# Patient Record
Sex: FEMALE | Race: ASIAN | NOT HISPANIC OR LATINO | Employment: PART TIME | URBAN - METROPOLITAN AREA
[De-identification: names, ages, dates, MRNs, and addresses within clinical notes are randomized per-mention and may not be internally consistent; named-entity substitution may affect disease eponyms.]

---

## 2017-01-17 ENCOUNTER — OFFICE VISIT (OUTPATIENT)
Dept: URGENT CARE | Facility: URGENT CARE | Age: 57
End: 2017-01-17
Payer: COMMERCIAL

## 2017-01-17 VITALS
DIASTOLIC BLOOD PRESSURE: 70 MMHG | SYSTOLIC BLOOD PRESSURE: 120 MMHG | RESPIRATION RATE: 16 BRPM | HEART RATE: 87 BPM | TEMPERATURE: 98.7 F | OXYGEN SATURATION: 99 %

## 2017-01-17 DIAGNOSIS — J01.90 ACUTE SINUSITIS WITH SYMPTOMS > 10 DAYS: Primary | ICD-10-CM

## 2017-01-17 DIAGNOSIS — J02.9 ACUTE PHARYNGITIS, UNSPECIFIED ETIOLOGY: ICD-10-CM

## 2017-01-17 LAB
DEPRECATED S PYO AG THROAT QL EIA: NORMAL
MICRO REPORT STATUS: NORMAL
SPECIMEN SOURCE: NORMAL

## 2017-01-17 PROCEDURE — 99213 OFFICE O/P EST LOW 20 MIN: CPT | Performed by: NURSE PRACTITIONER

## 2017-01-17 PROCEDURE — 87880 STREP A ASSAY W/OPTIC: CPT | Performed by: NURSE PRACTITIONER

## 2017-01-17 PROCEDURE — 87081 CULTURE SCREEN ONLY: CPT | Performed by: NURSE PRACTITIONER

## 2017-01-17 RX ORDER — AZITHROMYCIN 250 MG/1
TABLET, FILM COATED ORAL
Qty: 6 TABLET | Refills: 0 | Status: SHIPPED | OUTPATIENT
Start: 2017-01-17 | End: 2017-03-29

## 2017-01-17 NOTE — PATIENT INSTRUCTIONS
Sinusitis (Antibiotic Treatment)    The sinuses are air-filled spaces within the bones of the face. They connect to the inside of the nose. Sinusitis is an inflammation of the tissue lining the sinus cavity. Sinus inflammation can occur during a cold. It can also be due to allergies to pollens and other particles in the air. Sinusitis can cause symptoms of sinus congestion and fullness. A sinus infection causes fever, headache and facial pain. There is often green or yellow drainage from the nose or into the back of the throat (post-nasal drip). You have been given antibiotics to treat this condition.  Home care:    Take the full course of antibiotics as instructed. Do not stop taking them, even if you feel better.    Drink plenty of water, hot tea, and other liquids. This may help thin mucus. It also may promote sinus drainage.    Heat may help soothe painful areas of the face. Use a towel soaked in hot water. Or,  the shower and direct the hot spray onto your face. Using a vaporizer along with a menthol rub at night may also help.     An expectorant containing guaifenesin may help thin the mucus and promote drainage from the sinuses.    Over-the-counter decongestants may be used unless a similar medicine was prescribed. Nasal sprays work the fastest. Use one that contains phenylephrine or oxymetazoline. First blow the nose gently. Then use the spray. Do not use these medicines more often than directed on the label or symptoms may get worse. You may also use tablets containing pseudoephedrine. Avoid products that combine ingredients, because side effects may be increased. Read labels. You can also ask the pharmacist for help. (NOTE: Persons with high blood pressure should not use decongestants. They can raise blood pressure.)    Over-the-counter antihistamines may help if allergies contributed to your sinusitis.      Do not use nasal rinses or irrigation during an acute sinus infection, unless told to by  your health care provider. Rinsing may spread the infection to other sinuses.    Use acetaminophen or ibuprofen to control pain, unless another pain medicine was prescribed. (If you have chronic liver or kidney disease or ever had a stomach ulcer, talk with your doctor before using these medicines. Aspirin should never be used in anyone under 18 years of age who is ill with a fever. It may cause severe liver damage.)    Don't smoke. This can worsen symptoms.  Follow-up care  Follow up with your healthcare provider or our staff if you are not improving within the next week.  When to seek medical advice  Call your healthcare provider if any of these occur:    Facial pain or headache becoming more severe    Stiff neck    Unusual drowsiness or confusion    Swelling of the forehead or eyelids    Vision problems, including blurred or double vision    Fever of 100.4 F (38 C) or higher, or as directed by your healthcare provider    Seizure    Breathing problems    Symptoms not resolving within 10 days    9533-3000 [employer]. [address] [place] [address], [place] [address]. All rights reserved. This information is not intended as a substitute for professional medical care. Always follow your healthcare professional's instructions.

## 2017-01-17 NOTE — PROGRESS NOTES
SUBJECTIVE:                                                    Goldie Paul is a 56 year old female who presents to clinic today for the following health issues:    RESPIRATORY SYMPTOMS/SINUSITIS CONCERN      Duration: Two weeks    Description:  Nasal congestion, rhinorrhea, intermittent sore throat (due to PND), frontal/maxillary facial pain/pressure, intermittent cough (sometimes dry, sometimes productive), headache and fatigue/malaise.  Experiencing body aches but attributes this to fibromyalgia.      Severity: Mild to moderate.    Accompanying signs and symptoms:     History (predisposing factors):  None    Precipitating or alleviating factors: None    Therapies tried and outcome:  rest and fluids nasal spray/wash - Inhales steam with Vicks solution from a pot of boiling water which is very helpful at reducing sx temporarily.     Received a flu shot this season.    Problem list and histories reviewed, as indicated.  Additional history: as documented    Problem list, Medication list, Allergies, and Medical/SocialSurgical histories reviewed in [facility] and updated as appropriate.    ROS:  Constitutional, HEENT, cardiovascular, pulmonary, gi and gu systems are negative, except as otherwise noted.    OBJECTIVE:                                                    /70 mmHg  Pulse 87  Temp(Src) 98.7  F (37.1  C) (Oral)  Resp 16  SpO2 99%  There is no weight on file to calculate BMI.      GENERAL: Healthy, alert and in no acute distress  EYES: Eyes grossly normal to inspection, PERRL and conjunctivae and sclerae normal  HENT: Ear canals and TM's normal, nose and mouth without ulcers or lesions. Nasal turbinates red with drainage noted. Pharynx erythematous with PND present.  Bilateral cheeks slightly swollen and tender to palpation.  NECK: No adenopathy, no asymmetry, masses, or scars.  RESP: Lungs clear to auscultation - no rales, rhonchi or wheezes.  CV: Regular rate and rhythm, normal S1 S2, no S3 or S4,  no murmur, click or rub.  PSYCH: mentation appears normal, affect normal/bright.    Diagnostic Test Results:  Results for orders placed or performed in visit on 01/17/17 (from the past 24 hour(s))   Rapid strep screen   Result Value Ref Range    Specimen Description Throat     Rapid Strep A Screen       NEGATIVE: No Group A streptococcal antigen detected by immunoassay, await   culture report.      Micro Report Status FINAL 01/17/2017         ASSESSMENT:                                                    Acute Sinusitis with symptoms >10 days    PLAN:                                                    1. Acute pharyngitis, unspecified etiology  - Rapid strep screen  - Beta strep group A culture    2. Acute sinusitis with symptoms > 10 days  - Azithromycin (ZITHROMAX) 250 MG tablet; Two tablets first day, then one tablet daily for four days.  Dispense: 6 tablet; Refill: 0  - Review and implement home care measures per the patient instructions which were printed and given to the patient.     DYLAN Nettles [facility]

## 2017-01-17 NOTE — MR AVS SNAPSHOT
After Visit Summary   1/17/2017    Goldie Paul    MRN: [id number]           Patient Information     Date Of Birth          1960        Visit Information        Provider Department      1/17/2017 3:30 PM John Lemos NP [facility]        Today's Diagnoses     Acute pharyngitis, unspecified etiology    -  1       Care Instructions      Sinusitis (Antibiotic Treatment)    The sinuses are air-filled spaces within the bones of the face. They connect to the inside of the nose. Sinusitis is an inflammation of the tissue lining the sinus cavity. Sinus inflammation can occur during a cold. It can also be due to allergies to pollens and other particles in the air. Sinusitis can cause symptoms of sinus congestion and fullness. A sinus infection causes fever, headache and facial pain. There is often green or yellow drainage from the nose or into the back of the throat (post-nasal drip). You have been given antibiotics to treat this condition.  Home care:    Take the full course of antibiotics as instructed. Do not stop taking them, even if you feel better.    Drink plenty of water, hot tea, and other liquids. This may help thin mucus. It also may promote sinus drainage.    Heat may help soothe painful areas of the face. Use a towel soaked in hot water. Or,  the shower and direct the hot spray onto your face. Using a vaporizer along with a menthol rub at night may also help.     An expectorant containing guaifenesin may help thin the mucus and promote drainage from the sinuses.    Over-the-counter decongestants may be used unless a similar medicine was prescribed. Nasal sprays work the fastest. Use one that contains phenylephrine or oxymetazoline. First blow the nose gently. Then use the spray. Do not use these medicines more often than directed on the label or symptoms may get worse. You may also use tablets containing pseudoephedrine. Avoid products that combine ingredients,  because side effects may be increased. Read labels. You can also ask the pharmacist for help. (NOTE: Persons with high blood pressure should not use decongestants. They can raise blood pressure.)    Over-the-counter antihistamines may help if allergies contributed to your sinusitis.      Do not use nasal rinses or irrigation during an acute sinus infection, unless told to by your health care provider. Rinsing may spread the infection to other sinuses.    Use acetaminophen or ibuprofen to control pain, unless another pain medicine was prescribed. (If you have chronic liver or kidney disease or ever had a stomach ulcer, talk with your doctor before using these medicines. Aspirin should never be used in anyone under 18 years of age who is ill with a fever. It may cause severe liver damage.)    Don't smoke. This can worsen symptoms.  Follow-up care  Follow up with your healthcare provider or our staff if you are not improving within the next week.  When to seek medical advice  Call your healthcare provider if any of these occur:    Facial pain or headache becoming more severe    Stiff neck    Unusual drowsiness or confusion    Swelling of the forehead or eyelids    Vision problems, including blurred or double vision    Fever of 100.4 F (38 C) or higher, or as directed by your healthcare provider    Seizure    Breathing problems    Symptoms not resolving within 10 days    1612-8355 [employer]. [address] [place] [address], [place] [address]. All rights reserved. This information is not intended as a substitute for professional medical care. Always follow your healthcare professional's instructions.              Follow-ups after your visit        Who to contact     If you have questions or need follow up information about today's clinic visit or your schedule please contact [facility] directly at [phone].  Normal or non-critical lab and imaging results will be communicated to you by [place]  letter or phone within 4 business days after the clinic has received the results. If you do not hear from us within 7 days, please contact the clinic through [employer] or phone. If you have a critical or abnormal lab result, we will notify you by phone as soon as possible.  Submit refill requests through [employer] or call your pharmacy and they will forward the refill request to us. Please allow 3 business days for your refill to be completed.          Additional Information About Your Visit        [employer] Information     [employer] gives you secure access to your electronic health record. If you see a primary care provider, you can also send messages to your care team and make appointments. If you have questions, please call your primary care clinic.  If you do not have a primary care provider, please call [phone] and they will assist you.        Care EveryWhere ID     This is your Care EveryWhere ID. This could be used by other organizations to access your [facility] [place] medical records  [id number]        Your Vitals Were     Pulse Temperature Respirations Pulse Oximetry          87 98.7  F (37.1  C) (Oral) 16 99%         Blood Pressure from Last 3 Encounters:   01/17/17 120/70   12/01/16 102/62   05/05/16 102/58    Weight from Last 3 Encounters:   12/01/16 124 lb (56.246 kg)   05/05/16 116 lb (52.617 kg)   10/05/15 119 lb (53.978 kg)              We Performed the Following     Beta strep group A culture     Rapid strep screen        Primary Care Provider Office Phone # Fax #    Misbah Negrete [phone]       [facility] [address] [address]        Thank you!     Thank you for choosing [facility]  for your care. Our goal is always to provide you with excellent care. Hearing back from our patients is one way we can continue to improve our services. Please take a few minutes to complete the written survey that you may receive in the mail after your visit with us.  Thank you!             Your Updated Medication List - Protect others around you: Learn how to safely use, store and throw away your medicines at www.disposemymeds.org.          This list is accurate as of: 1/17/17  3:49 PM.  Always use your most recent med list.                   Brand Name Dispense Instructions for use    cyclobenzaprine 5 MG tablet    FLEXERIL    180 tablet    1-2 tabs at bedtime as needed for back pain/spasms       estradiol 0.05 MG/24HR BIW patch    VIVELLE-DOT    24 patch    Place 1 patch onto the skin twice a week       * gabapentin 100 MG capsule    NEURONTIN    180 capsule    1-2 capsules in AM daily as needed for pain       * gabapentin 300 MG capsule    NEURONTIN    180 capsule    2 capsule daily in the PM for pain       ibuprofen 200 MG capsule     100 capsule    Take 200 mg by mouth every 6 hours as needed for fever       melatonin 3 MG tablet     100 tablet    Take 1 tablet (3 mg) by mouth nightly as needed for sleep       pantoprazole 40 MG EC tablet    PROTONIX    90 tablet    Take 1 tablet (40 mg) by mouth daily       traMADol 50 MG tablet    ULTRAM    120 tablet    1-2 tabs twice a day as needed for pain       * Notice:  This list has 2 medication(s) that are the same as other medications prescribed for you. Read the directions carefully, and ask your doctor or other care provider to review them with you.

## 2017-01-17 NOTE — NURSING NOTE
"Chief Complaint   Patient presents with     Urgent Care     pt c/o sinus pressure and pn in face x 2 wks--headache --achey--at onset some ST and R ear pn but that seems better--SOB      Initial /70 mmHg  Pulse 87  Temp(Src) 98.7  F (37.1  C) (Oral)  Resp 16  SpO2 99% Estimated body mass index is 21.62 kg/(m^2) as calculated from the following:    Height as of 5/5/16: 5' 3.5\" (1.613 m).    Weight as of 12/1/16: 124 lb (56.246 kg)..  BP completed using cuff size: sree Mackey CMA ([facility])    "

## 2017-01-19 LAB
BACTERIA SPEC CULT: NORMAL
MICRO REPORT STATUS: NORMAL
SPECIMEN SOURCE: NORMAL

## 2017-03-13 ENCOUNTER — TRANSFERRED RECORDS (OUTPATIENT)
Dept: HEALTH INFORMATION MANAGEMENT | Facility: CLINIC | Age: 57
End: 2017-03-13

## 2017-03-29 ENCOUNTER — OFFICE VISIT (OUTPATIENT)
Dept: PEDIATRICS | Facility: CLINIC | Age: 57
End: 2017-03-29
Payer: COMMERCIAL

## 2017-03-29 VITALS
TEMPERATURE: 98.1 F | HEART RATE: 79 BPM | DIASTOLIC BLOOD PRESSURE: 72 MMHG | BODY MASS INDEX: 21.79 KG/M2 | OXYGEN SATURATION: 99 % | SYSTOLIC BLOOD PRESSURE: 116 MMHG | HEIGHT: 63 IN | WEIGHT: 123 LBS

## 2017-03-29 DIAGNOSIS — R10.13 ABDOMINAL PAIN, EPIGASTRIC: ICD-10-CM

## 2017-03-29 DIAGNOSIS — R07.89 ATYPICAL CHEST PAIN: Primary | ICD-10-CM

## 2017-03-29 DIAGNOSIS — K21.9 GASTROESOPHAGEAL REFLUX DISEASE WITHOUT ESOPHAGITIS: ICD-10-CM

## 2017-03-29 DIAGNOSIS — R00.2 PALPITATIONS: ICD-10-CM

## 2017-03-29 LAB
ERYTHROCYTE [DISTWIDTH] IN BLOOD BY AUTOMATED COUNT: 12.4 % (ref 10–15)
HCT VFR BLD AUTO: 41.9 % (ref 35–47)
HGB BLD-MCNC: 14 G/DL (ref 11.7–15.7)
LIPASE SERPL-CCNC: 164 U/L (ref 73–393)
MCH RBC QN AUTO: 30.2 PG (ref 26.5–33)
MCHC RBC AUTO-ENTMCNC: 33.4 G/DL (ref 31.5–36.5)
MCV RBC AUTO: 91 FL (ref 78–100)
PLATELET # BLD AUTO: 197 10E9/L (ref 150–450)
RBC # BLD AUTO: 4.63 10E12/L (ref 3.8–5.2)
WBC # BLD AUTO: 5.4 10E9/L (ref 4–11)

## 2017-03-29 PROCEDURE — 83690 ASSAY OF LIPASE: CPT | Performed by: INTERNAL MEDICINE

## 2017-03-29 PROCEDURE — 99214 OFFICE O/P EST MOD 30 MIN: CPT | Performed by: INTERNAL MEDICINE

## 2017-03-29 PROCEDURE — 93000 ELECTROCARDIOGRAM COMPLETE: CPT | Performed by: INTERNAL MEDICINE

## 2017-03-29 PROCEDURE — 85027 COMPLETE CBC AUTOMATED: CPT | Performed by: INTERNAL MEDICINE

## 2017-03-29 PROCEDURE — 36415 COLL VENOUS BLD VENIPUNCTURE: CPT | Performed by: INTERNAL MEDICINE

## 2017-03-29 NOTE — PATIENT INSTRUCTIONS
1. EKG looks ok  2. Labs today: blood counts, lipase (pancreas test)  3. Start zantac 150 mg twice a day in addition to the protonix  - if no improvement in symptoms in 2 weeks, would consider further testing

## 2017-03-29 NOTE — PROGRESS NOTES
SUBJECTIVE:                                                    Goldie Paul is a 56 year old female who presents to clinic today for the following health issues:    GERD/Heartburn      Duration: 1 month    Description (location/character/radiation): sharp epigastric pain, tightness under sternum, occ palpitations    Intensity:  moderate    Accompanying signs and symptoms:  food getting stuck: no   nausea/vomiting/blood: no   abdominal pain: no   black/tarry or bloody stools: no :    History (similar episodes/previous evaluation): 2 years ago and started Protonix    Precipitating or alleviating factors:  worse with no particular food or drink.  current NSAID/Aspirin use: YES- Ibuprofen    Therapies tried and outcome: Protonix and medication not helpful    55 y/o F with h/o fibromyalgia on ibuprofen, gabapentin and tramadol and GERD who presents with 1 month of increasing epigastric/left chest pain as well as palpitations.    Describes constant epigastric pain and pain under left breast but worse at times. Not short of breath. Sometimes pain radiates to shoulder blade. Pain worse last Thursday and again today. History of fibromyalgia with acheness in chest area. Has been slowly trying to wean off of pantoprazole - had been taking every other day for last 1-1.5 months. Went back to taking daily when pain for last 2 weeks and hasn't improved at all. Also taking ibuprofen 400 mg 1-2 times a day for fibromyalgia. Amount has not increased. Pain not worse with spicy foods, position changes or exertion. Doesn't drink any carbonated beverages or alcohol. Doesn't smoke. Drinks little caffeine. Has family history of heart disease in both parents. Last chol was done at [facility] in January and was mildly elevated. Is not on cholesterol medication. Palpitations were worse a few weeks ago. Worse if has caffeine. Has checked pulse and feels like skipped beats, but not tachy. Previous history of holter monitor that showed  "1 PVC. Tried tums on Saturday and seemed to help.     Reviewed and updated as needed this visit by clinical staff  Tobacco  Allergies  Meds  Problems  Med Hx  Surg Hx  Fam Hx  Soc Hx        Reviewed and updated as needed this visit by Provider  Allergies  Meds  Problems         -------------------------------------    Problem list and histories reviewed & adjusted, as indicated.  Additional history: as documented    ROS:  Constitutional, HEENT, cardiovascular, pulmonary, GI, , musculoskeletal, neuro, skin, endocrine and psych systems are negative, except as otherwise noted.    Problem list, Medication list, Allergies, and Medical/Social/Surgical histories reviewed in [facility] and updated as appropriate.    OBJECTIVE:                                                    /72 (BP Location: Right arm, Patient Position: Chair, Cuff Size: Adult Regular)  Pulse 79  Temp 98.1  F (36.7  C) (Tympanic)  Ht 5' 3\" (1.6 m)  Wt 123 lb (55.8 kg)  SpO2 99%  BMI 21.79 kg/m2   Body mass index is 21.79 kg/(m^2).  General Appearance: healthy, alert and no distress  Eyes:   no discharge, erythema.  Normal pupils.  Neck: Supple.  No adenopathy, no asymmetry, masses, or scars and thyroid normal to palpation  Respiratory: lungs clear to auscultation - no rales, rhonchi or wheezes.  Cardiovascular: regular rate and rhythm, normal S1 S2, no S3 or S4 and no murmur, click or rub.  No peripheral edema. No pain with palpation of chest wall.  Abdomen: soft, mild diffuse tenderness, no rebound or guarding, no hepatosplenomegaly or masses, and bowel sounds normal  Skin: no rashes or lesions.  Well perfused and normal turgor.    Diagnostic Test Results:  Results for orders placed or performed in visit on 03/29/17 (from the past 24 hour(s))   CBC with platelets   Result Value Ref Range    WBC 5.4 4.0 - 11.0 10e9/L    RBC Count 4.63 3.8 - 5.2 10e12/L    Hemoglobin 14.0 11.7 - 15.7 g/dL    Hematocrit 41.9 35.0 - 47.0 %    MCV 91 78 - " 100 fl    MCH 30.2 26.5 - 33.0 pg    MCHC 33.4 31.5 - 36.5 g/dL    RDW 12.4 10.0 - 15.0 %    Platelet Count 197 150 - 450 10e9/L      EKG: NSR, no ST changes    ASSESSMENT/PLAN:                                                      (R07.89) Atypical chest pain  (primary encounter diagnosis)  (R10.13) Abdominal pain, epigastric  (K21.9) Gastroesophageal reflux disease without esophagitis  Comment: epigastric and chest pain without immediate trigger. Suspect this is more gastric with decreasing protonix around the time the pain started. A little surprised pain not improving with resuming daily use. Main cardiac risk factor is family history. Chol moderately elevated with , but 10 year risk low at 1.4%. EKG normal  Plan: EKG 12-lead complete w/read - Clinics  - will start rantidine 150 mg bid for 2 weeks, continue protonix  - CBC negative, lipase pending with radiation of pain to back  - if symptoms do not resolve, will consider EGD vs stress test depending on if any change in symptoms  - would favor starting with stress test if no improvement with ranitidine    (R00.2) Palpitations  Comment: likely PVC. ekg negative  Plan: EKG 12-lead complete w/read - Clinics  - discussed avoiding caffeine  - will consider event monitor if increase in frequency      Follow up with Provider - as needed     [facility]

## 2017-03-29 NOTE — MR AVS SNAPSHOT
After Visit Summary   3/29/2017    Goldie Paul    MRN: [id number]           Patient Information     Date Of Birth          1960        Visit Information        Provider Department      3/29/2017 1:00 PM Nelsy Mccallum MD [facility] [place]        Today's Diagnoses     Palpitations    -  1    Atypical chest pain        Gastroesophageal reflux disease without esophagitis        Abdominal pain, epigastric          Care Instructions    1. EKG looks ok  2. Labs today: blood counts, lipase (pancreas test)  3. Start zantac 150 mg twice a day in addition to the protonix  - if no improvement in symptoms in 2 weeks, would consider further testing         Follow-ups after your visit        Who to contact     If you have questions or need follow up information about today's clinic visit or your schedule please contact [facility] [place] directly at [phone].  Normal or non-critical lab and imaging results will be communicated to you by Acronishart, letter or phone within 4 business days after the clinic has received the results. If you do not hear from us within 7 days, please contact the clinic through Acronishart or phone. If you have a critical or abnormal lab result, we will notify you by phone as soon as possible.  Submit refill requests through RENTISH or call your pharmacy and they will forward the refill request to us. Please allow 3 business days for your refill to be completed.          Additional Information About Your Visit        Acronishart Information     RENTISH gives you secure access to your electronic health record. If you see a primary care provider, you can also send messages to your care team and make appointments. If you have questions, please call your primary care clinic.  If you do not have a primary care provider, please call [phone] and they will assist you.        Care EveryWhere ID     This is your Care EveryWhere ID. This could be used by other organizations  "to access your [place] medical records  [id number]        Your Vitals Were     Pulse Temperature Height Pulse Oximetry BMI (Body Mass Index)       79 98.1  F (36.7  C) (Tympanic) 5' 3\" (1.6 m) 99% 21.79 kg/m2        Blood Pressure from Last 3 Encounters:   03/29/17 116/72   01/17/17 120/70   12/01/16 102/62    Weight from Last 3 Encounters:   03/29/17 123 lb (55.8 kg)   12/01/16 124 lb (56.2 kg)   05/05/16 116 lb (52.6 kg)              We Performed the Following     CBC with platelets     EKG 12-lead complete w/read - Clinics     Lipase          Today's Medication Changes          These changes are accurate as of: 3/29/17  2:07 PM.  If you have any questions, ask your nurse or doctor.               Start taking these medicines.        Dose/Directions    ranitidine 150 MG tablet   Commonly known as:  ZANTAC   Used for:  Gastroesophageal reflux disease without esophagitis   Started by:  Nelsy Mccallum MD        Dose:  150 mg   Take 1 tablet (150 mg) by mouth 2 times daily   Quantity:  60 tablet   Refills:  1            Where to get your medicines      These medications were sent to [facility] [address] [place] [address]     Phone:  [phone]     ranitidine 150 MG tablet                Primary Care Provider Office Phone # Fax #    Nelsy Mccallum [phone]       [address] [place] [address]        Thank you!     Thank you for choosing [facility]  for your care. Our goal is always to provide you with excellent care. Hearing back from our patients is one way we can continue to improve our services. Please take a few minutes to complete the written survey that you may receive in the mail after your visit with us. Thank you!             Your Updated Medication List - Protect others around you: Learn how to safely use, store and throw away your medicines at www.disposemymeds.org.        "   This list is accurate as of: 3/29/17  2:07 PM.  Always use your most recent med list.                   Brand Name Dispense Instructions for use    cyclobenzaprine 5 MG tablet    FLEXERIL    180 tablet    1-2 tabs at bedtime as needed for back pain/spasms       estradiol 0.05 MG/24HR BIW patch    VIVELLE-DOT    24 patch    Place 1 patch onto the skin twice a week       * gabapentin 100 MG capsule    NEURONTIN    180 capsule    1-2 capsules in AM daily as needed for pain       * gabapentin 300 MG capsule    NEURONTIN    180 capsule    2 capsule daily in the PM for pain       ibuprofen 200 MG capsule     100 capsule    Take 200 mg by mouth every 6 hours as needed for fever       melatonin 3 MG tablet     100 tablet    Take 1 tablet (3 mg) by mouth nightly as needed for sleep       pantoprazole 40 MG EC tablet    PROTONIX    90 tablet    Take 1 tablet (40 mg) by mouth daily       ranitidine 150 MG tablet    ZANTAC    60 tablet    Take 1 tablet (150 mg) by mouth 2 times daily       traMADol 50 MG tablet    ULTRAM    120 tablet    1-2 tabs twice a day as needed for pain       * Notice:  This list has 2 medication(s) that are the same as other medications prescribed for you. Read the directions carefully, and ask your doctor or other care provider to review them with you.

## 2017-03-29 NOTE — NURSING NOTE
"Chief Complaint   Patient presents with     Gastrointestinal Problem       Initial /72 (BP Location: Right arm, Patient Position: Chair, Cuff Size: Adult Regular)  Pulse 79  Temp 98.1  F (36.7  C) (Tympanic)  Ht 5' 3\" (1.6 m)  Wt 123 lb (55.8 kg)  SpO2 99%  BMI 21.79 kg/m2 Estimated body mass index is 21.79 kg/(m^2) as calculated from the following:    Height as of this encounter: 5' 3\" (1.6 m).    Weight as of this encounter: 123 lb (55.8 kg).  Medication Reconciliation: complete   Aditi De La Paz LPN      "

## 2017-06-05 DIAGNOSIS — M79.7 FIBROMYALGIA: ICD-10-CM

## 2017-06-06 RX ORDER — TRAMADOL HYDROCHLORIDE 50 MG/1
TABLET ORAL
Qty: 120 TABLET | Refills: 4 | Status: SHIPPED | OUTPATIENT
Start: 2017-06-06 | End: 2019-09-30

## 2017-06-06 NOTE — TELEPHONE ENCOUNTER
tramadol      Last Written Prescription Date:  12/1/16  Last Fill Quantity: 120,   # refills: 5  Last Office Visit with [facility] prescribing provider: 12/1/16  Future Office visit:       Routing refill request to provider for review/approval because:  Drug not on the [facility] refill protocol or controlled substance

## 2017-07-10 DIAGNOSIS — M79.7 FIBROMYALGIA: ICD-10-CM

## 2017-07-11 RX ORDER — CYCLOBENZAPRINE HCL 5 MG
TABLET ORAL
Qty: 180 TABLET | Refills: 1 | Status: SHIPPED | OUTPATIENT
Start: 2017-07-11 | End: 2018-06-05

## 2017-07-11 NOTE — TELEPHONE ENCOUNTER
cyclobenzaprine (FLEXERIL) 5 MG tablet      Last Written Prescription Date:  12/01/2016  Last Fill Quantity: 180,   # refills: 3  Last Office Visit with [facility], [facility] or [facility] prescribing provider: 12/01/2016  Future Office visit:       Routing refill request to provider for review/approval because:  Drug not on the [facility], [facility] or [facility] refill protocol or controlled substance

## 2017-08-08 DIAGNOSIS — K21.9 GASTROESOPHAGEAL REFLUX DISEASE WITHOUT ESOPHAGITIS: ICD-10-CM

## 2017-08-08 NOTE — TELEPHONE ENCOUNTER
Routing refill request to provider for review/approval because:  A break in medication, please sign if ok.  Brooke Andersen, RN  Triage Nurse

## 2017-08-08 NOTE — TELEPHONE ENCOUNTER
ranitidine (ZANTAC) 150 MG tablet      Last Written Prescription Date: 3/29/2017  Last Fill Quantity: 60,  # refills: 1   Last Office Visit with [facility], [facility] or [facility] prescribing provider: 3/29/2017

## 2017-09-05 ENCOUNTER — OFFICE VISIT (OUTPATIENT)
Dept: INTERNAL MEDICINE | Facility: CLINIC | Age: 57
End: 2017-09-05
Payer: COMMERCIAL

## 2017-09-05 VITALS
TEMPERATURE: 98.3 F | OXYGEN SATURATION: 98 % | WEIGHT: 129 LBS | HEART RATE: 90 BPM | DIASTOLIC BLOOD PRESSURE: 70 MMHG | SYSTOLIC BLOOD PRESSURE: 110 MMHG | BODY MASS INDEX: 22.85 KG/M2

## 2017-09-05 DIAGNOSIS — M54.16 LUMBAR RADICULOPATHY: Primary | ICD-10-CM

## 2017-09-05 PROCEDURE — 99213 OFFICE O/P EST LOW 20 MIN: CPT | Performed by: INTERNAL MEDICINE

## 2017-09-05 RX ORDER — METHYLPREDNISOLONE 4 MG
TABLET, DOSE PACK ORAL
Qty: 21 TABLET | Refills: 0 | Status: SHIPPED | OUTPATIENT
Start: 2017-09-05 | End: 2017-11-16

## 2017-09-05 NOTE — MR AVS SNAPSHOT
After Visit Summary   9/5/2017    Goldie Paul    MRN: [id number]           Patient Information     Date Of Birth          1960        Visit Information        Provider Department      9/5/2017 4:30 PM Misbah Negrete MD [facility]        Today's Diagnoses     Lumbar radiculopathy    -  1       Follow-ups after your visit        Who to contact     If you have questions or need follow up information about today's clinic visit or your schedule please contact [facility] directly at [phone].  Normal or non-critical lab and imaging results will be communicated to you by Zep Solarhart, letter or phone within 4 business days after the clinic has received the results. If you do not hear from us within 7 days, please contact the clinic through Zep Solarhart or phone. If you have a critical or abnormal lab result, we will notify you by phone as soon as possible.  Submit refill requests through [employer] or call your pharmacy and they will forward the refill request to us. Please allow 3 business days for your refill to be completed.          Additional Information About Your Visit        MyChart Information     [employer] gives you secure access to your electronic health record. If you see a primary care provider, you can also send messages to your care team and make appointments. If you have questions, please call your primary care clinic.  If you do not have a primary care provider, please call [phone] and they will assist you.        Care EveryWhere ID     This is your Care EveryWhere ID. This could be used by other organizations to access your [place] medical records  [id number]        Your Vitals Were     Pulse Temperature Pulse Oximetry BMI (Body Mass Index)          90 98.3  F (36.8  C) (Oral) 98% 22.85 kg/m2         Blood Pressure from Last 3 Encounters:   09/05/17 110/70   03/29/17 116/72   01/17/17 120/70    Weight from Last 3 Encounters:   09/05/17  129 lb (58.5 kg)   03/29/17 123 lb (55.8 kg)   12/01/16 124 lb (56.2 kg)              Today, you had the following     No orders found for display         Today's Medication Changes          These changes are accurate as of: 9/5/17 11:59 PM.  If you have any questions, ask your nurse or doctor.               Start taking these medicines.        Dose/Directions    methylPREDNISolone 4 MG tablet   Commonly known as:  MEDROL DOSEPAK   Used for:  Lumbar radiculopathy   Started by:  Misbah Negrete MD        Follow package instructions   Quantity:  21 tablet   Refills:  0            Where to get your medicines      These medications were sent to [facility] [address] [place] [address] [address] [facility]  [address] [place] [address]     Phone:  [phone]     methylPREDNISolone 4 MG tablet                Primary Care Provider Office Phone # Fax #    Misbah Negrete [phone]       [address] [place] [address]        Equal Access to Services     [facility] AH: Hadii aad ku hadasho Soomaali, waaxda luqadaha, qaybta kaalmada adeegyada, waxay idiin hayaan meredith petersaradaisy augustin . So [facility] [phone].    ATENCIÓN: Si habla español, tiene a cloud disposición servicios gratuitos de asistencia lingüística. Llame al [phone].    We comply with applicable federal civil rights laws and [place] laws. We do not discriminate on the basis of race, color, national origin, age, disability sex, sexual orientation or gender identity.            Thank you!     Thank you for choosing [facility]  for your care. Our goal is always to provide you with excellent care. Hearing back from our patients is one way we can continue to improve our services. Please take a few minutes to complete the written survey that you may receive in the mail after your visit with us. Thank you!             Your Updated Medication List - Protect others around you: Learn how to safely use, store and throw away your  medicines at www.disposemymeds.org.          This list is accurate as of: 9/5/17 11:59 PM.  Always use your most recent med list.                   Brand Name Dispense Instructions for use Diagnosis    cyclobenzaprine 5 MG tablet    FLEXERIL    180 tablet    1-2 tabs at bedtime as needed fibromyalgia muscle pain    Fibromyalgia       estradiol 0.05 MG/24HR BIW patch    VIVELLE-DOT    24 patch    Place 1 patch onto the skin twice a week    Symptomatic menopausal or female climacteric states       * gabapentin 100 MG capsule    NEURONTIN    180 capsule    1-2 capsules in AM daily as needed for pain    Fibromyalgia       * gabapentin 300 MG capsule    NEURONTIN    180 capsule    2 capsule daily in the PM for pain    Fibromyalgia       ibuprofen 200 MG capsule     100 capsule    Take 200 mg by mouth every 6 hours as needed for fever    Fibromyalgia       melatonin 3 MG tablet     100 tablet    Take 1 tablet (3 mg) by mouth nightly as needed for sleep    Insomnia, unspecified type       methylPREDNISolone 4 MG tablet    MEDROL DOSEPAK    21 tablet    Follow package instructions    Lumbar radiculopathy       pantoprazole 40 MG EC tablet    PROTONIX    90 tablet    Take 1 tablet (40 mg) by mouth daily    Gastroesophageal reflux disease without esophagitis       ranitidine 150 MG tablet    ZANTAC    60 tablet    TAKE ONE TABLET BY MOUTH TWICE DAILY    Gastroesophageal reflux disease without esophagitis       traMADol 50 MG tablet    ULTRAM    120 tablet    TAKE ONE TO TWO TABLETS TWICE DAILY AS NEEDED FOR PAIN    Fibromyalgia       * Notice:  This list has 2 medication(s) that are the same as other medications prescribed for you. Read the directions carefully, and ask your doctor or other care provider to review them with you.

## 2017-09-05 NOTE — NURSING NOTE
"Chief Complaint   Patient presents with     Back Pain       Initial /70  Pulse 90  Temp 98.3  F (36.8  C) (Oral)  Wt 129 lb (58.5 kg)  SpO2 98%  BMI 22.85 kg/m2 Estimated body mass index is 22.85 kg/(m^2) as calculated from the following:    Height as of 3/29/17: 5' 3\" (1.6 m).    Weight as of this encounter: 129 lb (58.5 kg).  Medication Reconciliation: complete  "

## 2017-09-05 NOTE — PROGRESS NOTES
SUBJECTIVE:   Goldie Paul is a 57 year old female who presents to clinic today for the following health issues:      Back Pain       Duration: 2 days        Specific cause: running    Description:   Location of pain: low back bilateral  Character of pain: sharp  Pain radiation:radiates into the right leg and radiates into the left leg  New numbness or weakness in legs, not attributed to pain:  no     Intensity: Currently 7/10    History:   Pain interferes with job: Not applicable,   History of back problems: yes, LBP  Any previous MRI or X-rays: None  Sees a specialist for back pain:  No  Therapies tried without relief: chiropractor    Alleviating factors:   Improved by: NSAIDs and Flexeril, helped some    Precipitating factors:  Worsened by: Lifting, Bending, Standing, Sitting and Walking    Functional and Psychosocial Screen (Luis Fernando STarT Back):      Not performed today          Accompanying Signs & Symptoms:  Risk of Fracture:  None  Risk of Cauda Equina:  None  Risk of Infection:  None  Risk of Cancer:  None  Risk of Ankylosing Spondylitis:  Onset at age <35, male, AND morning back stiffness. no     Pt's past medical history, family history, habits, medications and allergies were reviewed with the patient today.  See snap shot for  HCM status. Most recent lab results reviewed with pt. Problem list and histories reviewed & adjusted, as indicated.  Additional history as below:    Was playing frisbee and then woke up the next AM and pain in back.  Normally able to do elliptical at gym 2-3x/week. Pain lumbar at the belt area and L>R.   Some radicular symptoms into the bilateral lower extremities as above going to the distal calves. Patient denies bowel or bladder incontinence. No weakness in the lower extremities. History of fibromyalgia but this feels much different     Additional ROS:   Constitutional, HEENT, Cardiovascular, Pulmonary, GI and , Neuro, MSK and Psych review of systems/symptoms are otherwise  "negative or unchanged from previous, except as noted above.      OBJECTIVE:  /70  Pulse 90  Temp 98.3  F (36.8  C) (Oral)  Wt 129 lb (58.5 kg)  SpO2 98%  BMI 22.85 kg/m2   Estimated body mass index is 22.85 kg/(m^2) as calculated from the following:    Height as of 3/29/17: 5' 3\" (1.6 m).    Weight as of this encounter: 129 lb (58.5 kg).  Eye: PERRL, EOMI  HENT: ear canals and TM's normal and nose and mouth without ulcers or lesions   Neck: no adenopathy. Thyroid normal to palpation. No bruits  Pulm: Lungs clear to auscultation   CV: Regular rates and rhythm  GI: Soft, nontender, Normal active bowel sounds, No hepatosplenomegaly or masses palpable  Ext: Peripheral pulses intact. No edema.  Neuro: Normal strength and tone, sensory exam grossly normal  Back: Tenderness to palpation bilateral (L>R) paralumbar area. Neg SLRT bilaterally though pain is worsened some specifically in lumbar area with this testing      Assessment/Plan: (See plan discussion below for further details)  1. Lumbar radiculopathy  - methylPREDNISolone (MEDROL DOSEPAK) 4 MG tablet; Follow package instructions  Dispense: 21 tablet; Refill: 0    Plan discussion:  Medrol dose pack 6 days  Heat/stretching in AM. Ice later part of the day as needed  If not improving with above after 1 week,then pt to email for referral to [facility] for physical therapy  May also try increasing Gabapentin shortterm at bedtime from 600mg -> 900mg       Misbah Negrete MD  Internal Medicine Department  [facility]        "

## 2017-09-05 NOTE — LETTER
[address] [address]  [phone]      9/5/2017     Re:   Goldie Paul  [facility] [facility] [address]        To whom it may concern,  Goldie Paul was seen in clinic today for medical issues.  Please excuse her from work today. If you have further questions regarding this matter, please feel free to contact me at [phone]    Sincerely,            Misbah Negrete MD  Internal Medicine

## 2017-09-06 ENCOUNTER — MYC MEDICAL ADVICE (OUTPATIENT)
Dept: INTERNAL MEDICINE | Facility: CLINIC | Age: 57
End: 2017-09-06

## 2017-09-06 DIAGNOSIS — M79.7 FIBROMYALGIA: Primary | ICD-10-CM

## 2017-09-06 DIAGNOSIS — G89.4 CHRONIC PAIN SYNDROME: ICD-10-CM

## 2017-10-09 ENCOUNTER — TRANSFERRED RECORDS (OUTPATIENT)
Dept: HEALTH INFORMATION MANAGEMENT | Facility: CLINIC | Age: 57
End: 2017-10-09

## 2017-10-25 ENCOUNTER — TRANSFERRED RECORDS (OUTPATIENT)
Dept: HEALTH INFORMATION MANAGEMENT | Facility: CLINIC | Age: 57
End: 2017-10-25

## 2017-11-02 ENCOUNTER — MYC MEDICAL ADVICE (OUTPATIENT)
Dept: INTERNAL MEDICINE | Facility: CLINIC | Age: 57
End: 2017-11-02

## 2017-11-02 DIAGNOSIS — M72.2 PLANTAR FASCIITIS: Primary | ICD-10-CM

## 2017-11-15 ENCOUNTER — THERAPY VISIT (OUTPATIENT)
Dept: PHYSICAL THERAPY | Facility: CLINIC | Age: 57
End: 2017-11-15
Payer: COMMERCIAL

## 2017-11-15 DIAGNOSIS — M54.50 CHRONIC BILATERAL LOW BACK PAIN WITHOUT SCIATICA: Primary | ICD-10-CM

## 2017-11-15 DIAGNOSIS — M54.2 CERVICALGIA: ICD-10-CM

## 2017-11-15 DIAGNOSIS — G89.29 CHRONIC BILATERAL LOW BACK PAIN WITHOUT SCIATICA: Primary | ICD-10-CM

## 2017-11-15 PROCEDURE — 97110 THERAPEUTIC EXERCISES: CPT | Mod: GP | Performed by: PHYSICAL THERAPIST

## 2017-11-15 PROCEDURE — 97163 PT EVAL HIGH COMPLEX 45 MIN: CPT | Mod: GP | Performed by: PHYSICAL THERAPIST

## 2017-11-15 PROCEDURE — 97112 NEUROMUSCULAR REEDUCATION: CPT | Mod: GP | Performed by: PHYSICAL THERAPIST

## 2017-11-15 NOTE — LETTER
[facility]  [address]  [address]  [place] [facility] [address]  [phone]    November 15, 2017    Re: Goldie Paul   :   1960  MRN:  [id number]   REFERRING PHYSICIAN:   Hector Condon    [facility]    Date of Initial Evaluation: 17  Visits:  Rxs Used: 1  Reason for Referral:     Chronic bilateral low back pain without sciatica  Cervicalgia    EVALUATION SUMMARY    [facility] Initial Evaluation    Subjective:  Patient is a 57 year old female presenting with rehab back hpi. Goldie Paul is a 57 year old female with a lumbar condition.  This is a recurrent condition  Pt has had two episodes of low back pain over the past three months.  Pt has had off and on low back pain for many years.  The difference with the most recent episodes of pain is that they were much more intense.  Pt also then developed neck pain after her low back improved.  Pt has never had any neck pain previously.  Pt has had the neck pain for just over a month.  First episode started with bending down, insidious onset with second episode. Pt works as a nurse at [employer].  Pt works out at [employer], uses elliptical and treadmill and some yoga back stretches. Site of Pain: low back and radiating down L>R leg occasionally to knee.  cervical and B shoulders.    Pain is described as aching and is constant and reported as 8/10 (neck 8/10, low back 4/10).  Associated symptoms:  Loss of motion/stiffness. Pain is the same all the time.  Symptoms are exacerbated by walking, bending and certain positions and relieved by rest.    Special tests:  MRI (normal low back). General health as reported by patient is good.  Past medical history: plantar fasciitis, fibromyalgia, numbness and tingling B feet, pain at night which is worse after a busy day. Current medications:  Muscle relaxants, sleep medication, anti-inflammatory and pain medication (hormone  replacement).    Patient is working in normal job without restrictions.  Primary job tasks include:  Prolonged standing and lifting.  Red flags: FM.    Objective:  Flexibility/Screens:   Positive screens:  Cervical and Lumbar              Re: Goldie Paul   :   1960    Alejandro Cervical Evaluation  Posture:  Sitting: fair  Standing: fair  Protruding Head: no  Wry Neck: no  Correction of Posture: no effect  Movement Loss:  Protrusion (PRO): nil  Flexion (Flex): min  Retraction (RET): min  Extension (EXT): min  Lateral Flexion Right (LF R): min  Lateral Flexion Left (LF L): mod and pain  Rotation Right (ROT R): min  Rotation Left (ROT L): mod and pain  Test Movements:  RET:   Repeat RET: During: decreases  After: better  Mechanical Response: IncROM  Conclusion: derangement  Principle of Treatment:  Extension: x     Alejandro Lumbar Evaluation  Posture:  Sitting: poor  Standing: fair  Lordosis: Reduced  Lateral Shift: no  Correction of Posture: no effect  Other Observations: repeated extension in lying with hips offset to L: decr/better with incr ROM  Movement Loss:  Flexion (Flex): nil  Extension (EXT): mod and pain  Side Hilger R (SG R): mod and pain  Side Glide L (SG L): min  Test Movements:  EIL:   Repeat EIL: During: produces  After: worse  Mechanical Response: no effect  Conclusion: derangement  Principle of Treatment:  Lateral: x    Assessment/Plan:    Patient is a 57 year old female with cervical, lumbar, both sides hip and both sides knee complaints.    Patient has the following significant findings with corresponding treatment plan.                Diagnosis 1:  Low back and neck pain      Re: Goldie Paul   :   1960        Pain -  hot/cold therapy, manual therapy, self management, education, directional preference exercise and home program  Decreased ROM/flexibility - manual therapy and therapeutic exercise  Decreased strength - therapeutic exercise and therapeutic activities  Impaired  muscle performance - neuro re-education  Decreased function - therapeutic activities  Impaired posture - neuro re-education        Therapy Evaluation Codes:   1) History comprised of:   Personal factors that impact the plan of care:      Gender, Past/current experiences, Profession and Time since onset of symptoms.    Comorbidity factors that impact the plan of care are:      Fibromyalgia, Numbness/tingling and Pain at night/rest.     Medications impacting care: Anti-inflammatory, Muscle relaxant, Pain and Sleep.  2) Examination of Body Systems comprised of:   Body structures and functions that impact the plan of care:      Cervical spine, Hip, Knee, Lumbar spine and Pelvis.   Activity limitations that impact the plan of care are:      Bending, Lifting, Standing, Walking and Working.  3) Clinical presentation characteristics are:   Unstable/Unpredictable.  4) Decision-Making    High complexity using standardized patient assessment instrument and/or measureable assessment of functional outcome.  Cumulative Therapy Evaluation is: High complexity.    Previous and current functional limitations:  (See Goal Flow Sheet for this information)    Short term and Long term goals: (See Goal Flow Sheet for this information)     Communication ability:  Patient appears to be able to clearly communicate and understand verbal and written communication and follow directions correctly.  Treatment Explanation - The following has been discussed with the patient:   RX ordered/plan of care  Anticipated outcomes  Possible risks and side effects  This patient would benefit from PT intervention to resume normal activities.   Rehab potential is good.    Frequency:  2 X week, once daily  Duration:  for 3 weeks          Re: Goldie Paul   :   1960        Discharge Plan:  Achieve all LTG.  Independent in home treatment program.  Reach maximal therapeutic benefit.              Thank you for your referral.    INQUIRIES  Therapist:  ________________________________________Noel Leiva   [facility] [place]  [address] [place] [address]  [place] [address]  Phone: [phone]  Fax: [phone]

## 2017-11-15 NOTE — PROGRESS NOTES
[facility] Initial Evaluation    Subjective:    Patient is a 57 year old female presenting with rehab back hpi.   Goldie Paul is a 57 year old female with a lumbar condition.      This is a recurrent condition  Pt has had two episodes of low back pain over the past three months.  Pt has had off and on low back pain for many years.  The difference with the most recent episodes of pain is that they were much more intense.  Pt also then developed neck pain after her low back improved.  Pt has never had any neck pain previously.  Pt has had the neck pain for just over a month.      First episode started with bending down, insidious onset with second episode.    Pt works as a nurse at [employer].      Pt works out at [employer], uses elliptical and treadmill and some yoga back stretches..    Site of Pain: low back and radiating down L>R leg occasionally to knee.  cervical and B shoulders.    Pain is described as aching and is constant and reported as 8/10 (neck 8/10, low back 4/10).  Associated symptoms:  Loss of motion/stiffness. Pain is the same all the time.  Symptoms are exacerbated by walking, bending and certain positions and relieved by rest.    Special tests:  MRI (normal low back).      General health as reported by patient is good.  Past medical history: plantar fasciitis, fibromyalgia, numbness and tingling B feet, pain at night which is worse after a busy day.      Current medications:  Muscle relaxants, sleep medication, anti-inflammatory and pain medication (hormone replacement).    Patient is working in normal job without restrictions.  Primary job tasks include:  Prolonged standing and lifting.        Red flags: FM.                        Objective:          Flexibility/Screens:   Positive screens:  Cervical and Lumbar              Physical Exam    Alejandro Cervical Evaluation    Posture:  Sitting: fair  Standing: fair  Protruding Head: no  Wry Neck: no  Correction of  Posture: no effect    Movement Loss:  Protrusion (PRO): nil  Flexion (Flex): min  Retraction (RET): min  Extension (EXT): min  Lateral Flexion Right (LF R): min  Lateral Flexion Left (LF L): mod and pain  Rotation Right (ROT R): min  Rotation Left (ROT L): mod and pain  Test Movements:      RET:   Repeat RET: During: decreases  After: better  Mechanical Response: IncROM                          Conclusion: derangement  Principle of Treatment:      Extension: x      Alejandro Lumbar Evaluation    Posture:  Sitting: poor  Standing: fair  Lordosis: Reduced  Lateral Shift: no  Correction of Posture: no effect  Other Observations: repeated extension in lying with hips offset to L: decr/better with incr ROM  Movement Loss:  Flexion (Flex): nil  Extension (EXT): mod and pain  Side Miles R (SG R): mod and pain  Side Glide L (SG L): min  Test Movements:        EIL:   Repeat EIL: During: produces  After: worse  Mechanical Response: no effect        Conclusion: derangement  Principle of Treatment:        Lateral: x                                         ROS    Assessment/Plan:      Patient is a 57 year old female with cervical, lumbar, both sides hip and both sides knee complaints.    Patient has the following significant findings with corresponding treatment plan.                Diagnosis 1:  Low back and neck pain      Pain -  hot/cold therapy, manual therapy, self management, education, directional preference exercise and home program  Decreased ROM/flexibility - manual therapy and therapeutic exercise  Decreased strength - therapeutic exercise and therapeutic activities  Impaired muscle performance - neuro re-education  Decreased function - therapeutic activities  Impaired posture - neuro re-education    Therapy Evaluation Codes:   1) History comprised of:   Personal factors that impact the plan of care:      Gender, Past/current experiences, Profession and Time since onset of symptoms.    Comorbidity factors that impact  the plan of care are:      Fibromyalgia, Numbness/tingling and Pain at night/rest.     Medications impacting care: Anti-inflammatory, Muscle relaxant, Pain and Sleep.  2) Examination of Body Systems comprised of:   Body structures and functions that impact the plan of care:      Cervical spine, Hip, Knee, Lumbar spine and Pelvis.   Activity limitations that impact the plan of care are:      Bending, Lifting, Standing, Walking and Working.  3) Clinical presentation characteristics are:   Unstable/Unpredictable.  4) Decision-Making    High complexity using standardized patient assessment instrument and/or measureable assessment of functional outcome.  Cumulative Therapy Evaluation is: High complexity.    Previous and current functional limitations:  (See Goal Flow Sheet for this information)    Short term and Long term goals: (See Goal Flow Sheet for this information)     Communication ability:  Patient appears to be able to clearly communicate and understand verbal and written communication and follow directions correctly.  Treatment Explanation - The following has been discussed with the patient:   RX ordered/plan of care  Anticipated outcomes  Possible risks and side effects  This patient would benefit from PT intervention to resume normal activities.   Rehab potential is good.    Frequency:  2 X week, once daily  Duration:  for 3 weeks  Discharge Plan:  Achieve all LTG.  Independent in home treatment program.  Reach maximal therapeutic benefit.    Please refer to the daily flowsheet for treatment today, total treatment time and time spent performing 1:1 timed codes.

## 2017-11-16 ENCOUNTER — OFFICE VISIT (OUTPATIENT)
Dept: INTERNAL MEDICINE | Facility: CLINIC | Age: 57
End: 2017-11-16
Payer: COMMERCIAL

## 2017-11-16 VITALS
WEIGHT: 126.5 LBS | HEART RATE: 90 BPM | OXYGEN SATURATION: 98 % | TEMPERATURE: 98.6 F | DIASTOLIC BLOOD PRESSURE: 82 MMHG | SYSTOLIC BLOOD PRESSURE: 110 MMHG | BODY MASS INDEX: 22.41 KG/M2 | HEIGHT: 63 IN

## 2017-11-16 DIAGNOSIS — R10.13 ABDOMINAL PAIN, EPIGASTRIC: Primary | ICD-10-CM

## 2017-11-16 DIAGNOSIS — Z12.11 SPECIAL SCREENING FOR MALIGNANT NEOPLASMS, COLON: ICD-10-CM

## 2017-11-16 PROCEDURE — 87338 HPYLORI STOOL AG IA: CPT | Performed by: INTERNAL MEDICINE

## 2017-11-16 PROCEDURE — 99214 OFFICE O/P EST MOD 30 MIN: CPT | Performed by: INTERNAL MEDICINE

## 2017-11-16 RX ORDER — SUCRALFATE 1 G/1
1 TABLET ORAL 4 TIMES DAILY
Qty: 40 TABLET | Refills: 1 | Status: SHIPPED | OUTPATIENT
Start: 2017-11-16 | End: 2017-12-22

## 2017-11-16 NOTE — PROGRESS NOTES
"Dr Merchant's note      Patient's instructions / PLAN:                                                        Plan:  1. Sucralfate 1 gram 4 times a day  2. Continue with Pantoprazole and Ranitidine   3. Abdomen ultrasound - To schedule this test you may call Scheduling center at [phone]    4. Upper endoscopy and COlonoscopy -- [facility] (   Appt. Line [phone] with GI   [place] [facility] ( [facility] [place]: [phone], [facility] phone: [phone]  5. Stool test for H pylori       ASSESSMENT & PLAN:                                                      (R10.13) Abdominal pain, epigastric  (primary encounter diagnosis)  Comment:   Plan: sucralfate (CARAFATE) 1 GM tablet,  Abdomen         Limited, GASTROENTEROLOGY ADULT REF PROCEDURE         ONLY, GASTROENTEROLOGY ADULT REF PROCEDURE         ONLY, H Pylori antigen, stool            (Z12.11) Special screening for malignant neoplasms, colon  Comment:   Plan: GASTROENTEROLOGY ADULT REF PROCEDURE ONLY,         GASTROENTEROLOGY ADULT REF PROCEDURE ONLY                 Chief complaint:                                                      epig pain     SUBJECTIVE:   Goldie Paul is a 57 year old female who presents to clinic today for the following health issues:    Epigastric pain   -- x 4 weeks.  -- Takes Pantoprazole and Ranitidine.  -- Little better than 4 w ago, but still persistent.  --  Food doesn't make a diff.  -- no blood in stools.        Review of Systems:                                                      ROS: negative for fever, chills, cough, wheezes, chest pain, shortness of breath, vomiting,  leg swelling pos abdominal pain,    A 10-point review of systems was obtained.  Those pertinent are above and in the in the Subjective section.  The rest of the systems are negative.           OBJECTIVE:             Physical exam:  Blood pressure 110/82, pulse 90, temperature 98.6  F (37  C), temperature source Oral, height 5' 3\" (1.6 m), " weight 126 lb 8 oz (57.4 kg), SpO2 98 %, not currently breastfeeding.   NAD, appears comfortable  Skin: no rashes   Neck: supple, no JVD,  No thyroidmegaly. Lymph nodes nonpalpable cervical and supraclavicular.  Chest: clear to auscultation bilaterally, good respiratory effort  Heart: S1 S2, RRR, no mgr appreciated  Abdomen: soft, mild epig  tender, no hepatosplenomegaly or masses appreciated, no abdominal bruit, present bowel sounds  Extremities: no edema,   Neurologic: A, Ox3, no focal signs appreciated    PMHx: reviewed  Past Medical History:   Diagnosis Date     Allergic rhinitis, cause unspecified      Disorders of bursae and tendons in shoulder region, unspecified 1/06    right     Endometriosis of other specified sites      Esophageal reflux      FIBROMYALGIA     Neg Rheum work-up     Hyperlipidemia      Impingement syndrome of left shoulder     s/p cortisone injection     Insomnia 12/23/2011     Lateral epicondylitis  of elbow 2/06    left     Plantar fascial fibromatosis      Polyneuropathy in other diseases classified elsewhere (H)      Radial styloid tenosynovitis     bilateral     Sprain of hand, unspecified site 1/06    right thumb MCP joint     Uncomplicated asthma       PSHx: reviewed  Past Surgical History:   Procedure Laterality Date     ABDOMEN SURGERY  1992    hysterectomy     APPENDECTOMY  1977     C NONSPECIFIC PROCEDURE  2004    Plantar fasciotomy     C NONSPECIFIC PROCEDURE  1992    DAVID, BSO for endometriosis        Meds: reviewed  Current Outpatient Prescriptions   Medication Sig Dispense Refill     order for DME Orthotics for both feet 1 Package 2     order for DME TENS unit. Use daily as needed for musculoskeletal pain 1 Device 0     ranitidine (ZANTAC) 150 MG tablet TAKE ONE TABLET BY MOUTH TWICE DAILY  60 tablet 11     cyclobenzaprine (FLEXERIL) 5 MG tablet 1-2 tabs at bedtime as needed fibromyalgia muscle pain 180 tablet 1     traMADol (ULTRAM) 50 MG tablet TAKE ONE TO TWO TABLETS TWICE  DAILY AS NEEDED FOR PAIN 120 tablet 4     estradiol (VIVELLE-DOT) 0.05 MG/24HR BIW patch Place 1 patch onto the skin twice a week 24 patch 3     gabapentin (NEURONTIN) 100 MG capsule 1-2 capsules in AM daily as needed for pain 180 capsule 3     gabapentin (NEURONTIN) 300 MG capsule 2 capsule daily in the PM for pain 180 capsule 3     melatonin 3 MG tablet Take 1 tablet (3 mg) by mouth nightly as needed for sleep 100 tablet 3     pantoprazole (PROTONIX) 40 MG EC tablet Take 1 tablet (40 mg) by mouth daily 90 tablet 3     ibuprofen 200 MG capsule Take 200 mg by mouth every 6 hours as needed for fever 100 capsule 3       Soc Hx: reviewed  Fam Hx: reviewed          Leanne Merchant MD  Internal Medicine

## 2017-11-16 NOTE — PATIENT INSTRUCTIONS
Plan:  1. Sucralfate 1 gram 4 times a day  2. Continue with Pantoprazole and Ranitidine   3. Abdomen ultrasound - To schedule this test you may call Scheduling center at [phone]    4. Upper endoscopy and COlonoscopy   -- [facility] (   Appt. Line [phone] with GI ) or   [place] [facility] ( [facility] [place]: [phone], [facility] phone: [phone]  5. Stool test for H pylori

## 2017-11-16 NOTE — MR AVS SNAPSHOT
After Visit Summary   11/16/2017    Goldie Paul    MRN: [id number]           Patient Information     Date Of Birth          1960        Visit Information        Provider Department      11/16/2017 12:00 PM Leanne Vela MD [facility]        Today's Diagnoses     Abdominal pain, epigastric    -  1    Special screening for malignant neoplasms, colon          Care Instructions      Plan:  1. Sucralfate 1 gram 4 times a day  2. Continue with Pantoprazole and Ranitidine   3. Abdomen ultrasound - To schedule this test you may call Scheduling center at [phone]    4. Upper endoscopy and COlonoscopy   -- [facility] (   Appt. Line [phone] with GI ) or   -- [facility] ( [facility] [place] for [place]: [phone], [facility] phone: [phone]  5. Stool test for H pylori           Follow-ups after your visit        Additional Services     GASTROENTEROLOGY ADULT REF PROCEDURE ONLY       Last Lab Result: Creatinine (mg/dL)       Date                     Value                 05/29/2012               0.65             ----------  Body mass index is 22.41 kg/(m^2).     Needed:  No  Language:  English    Patient will be contacted to schedule procedure.     Please be aware that coverage of these services is subject to the terms and limitations of your health insurance plan.  Call member services at your health plan with any benefit or coverage questions.  Any procedures must be performed at a [place] facility OR coordinated by your clinic's referral office.    Please bring the following with you to your appointment:    (1) Any X-Rays, CTs or MRIs which have been performed.  Contact the facility where they were done to arrange for  prior to your scheduled appointment.    (2) List of current medications   (3) This referral request   (4) Any documents/labs given to you for this referral            GASTROENTEROLOGY ADULT REF PROCEDURE ONLY       Last Lab  Result: Creatinine (mg/dL)       Date                     Value                 05/29/2012               0.65             ----------  Body mass index is 22.41 kg/(m^2).     Needed:  No  Language:  English    Patient will be contacted to schedule procedure.     Please be aware that coverage of these services is subject to the terms and limitations of your health insurance plan.  Call member services at your health plan with any benefit or coverage questions.  Any procedures must be performed at a [place] facility OR coordinated by your clinic's referral office.    Please bring the following with you to your appointment:    (1) Any X-Rays, CTs or MRIs which have been performed.  Contact the facility where they were done to arrange for  prior to your scheduled appointment.    (2) List of current medications   (3) This referral request   (4) Any documents/labs given to you for this referral                  Your next 10 appointments already scheduled     Nov 17, 2017  1:50 PM CST   [facility]ATON[facility] Spine with Noel Leiva PT   [facility] ([facility]  )    [address] [place] [address]  [address]  [facility] [address]   [phone]            Nov 29, 2017 10:50 AM CST   LATONYA Spine with Naga Willson PT   [facility] ([facility]  )    [address]  [address]  [facility] [address]   [phone]              Future tests that were ordered for you today     Open Future Orders        Priority Expected Expires Ordered    H Pylori antigen, stool Routine  12/16/2017 11/16/2017    US Abdomen Limited Routine  11/16/2018 11/16/2017            Who to contact     If you have questions or need follow up information about today's clinic visit or your schedule please contact [facility] directly at [phone].  Normal or non-critical lab and imaging results will be communicated to you by MyChart, letter or phone within 4 business days after the  "clinic has received the results. If you do not hear from us within 7 days, please contact the clinic through [email or web address] or phone. If you have a critical or abnormal lab result, we will notify you by phone as soon as possible.  Submit refill requests through [email or web address] or call your pharmacy and they will forward the refill request to us. Please allow 3 business days for your refill to be completed.          Additional Information About Your Visit        [facility] Information     [email or web address] gives you secure access to your electronic health record. If you see a primary care provider, you can also send messages to your care team and make appointments. If you have questions, please call your primary care clinic.  If you do not have a primary care provider, please call [phone] and they will assist you.        Care EveryWhere ID     This is your Care EveryWhere ID. This could be used by other organizations to access your [facility] medical records  [id number]        Your Vitals Were     Pulse Temperature Height Pulse Oximetry Breastfeeding? BMI (Body Mass Index)    90 98.6  F (37  C) (Oral) 5' 3\" (1.6 m) 98% No 22.41 kg/m2       Blood Pressure from Last 3 Encounters:   11/16/17 110/82   09/05/17 110/70   03/29/17 116/72    Weight from Last 3 Encounters:   11/16/17 126 lb 8 oz (57.4 kg)   09/05/17 129 lb (58.5 kg)   03/29/17 123 lb (55.8 kg)              We Performed the Following     GASTROENTEROLOGY ADULT REF PROCEDURE ONLY     GASTROENTEROLOGY ADULT REF PROCEDURE ONLY          Today's Medication Changes          These changes are accurate as of: 11/16/17 12:33 PM.  If you have any questions, ask your nurse or doctor.               Start taking these medicines.        Dose/Directions    sucralfate 1 GM tablet   Commonly known as:  CARAFATE   Used for:  Abdominal pain, epigastric   Started by:  Leanne Vela MD        Dose:  1 g   Take 1 tablet (1 g) by mouth 4 times daily   Quantity:  40 tablet   Refills:  1 "            Where to get your medicines      These medications were sent to [facility] [address] [place] [address]  [address] [place] [address]     Phone:  [phone]     sucralfate 1 GM tablet                Primary Care Provider Office Phone # Fax #    Misbah Negrete [phone]       [address] [facility] [address]        Equal Access to Services     KAILA LOMBARDO : Hadii aad ku hadasho Soomaali, waaxda luqadaha, qaybta kaalmada adeegyada, waxay idiin hayaan adeeg karridaisy lafredy . So [facility] [phone].    ATENCIÓN: Si yann owens, tiene a cloud disposición servicios gratuitos de asistencia lingüística. [facility] [phone].    We comply with applicable federal civil rights laws and [place] laws. We do not discriminate on the basis of race, color, national origin, age, disability, sex, sexual orientation, or gender identity.            Thank you!     Thank you for choosing [facility]  for your care. Our goal is always to provide you with excellent care. Hearing back from our patients is one way we can continue to improve our services. Please take a few minutes to complete the written survey that you may receive in the mail after your visit with us. Thank you!             Your Updated Medication List - Protect others around you: Learn how to safely use, store and throw away your medicines at www.disposemymeds.org.          This list is accurate as of: 11/16/17 12:33 PM.  Always use your most recent med list.                   Brand Name Dispense Instructions for use Diagnosis    cyclobenzaprine 5 MG tablet    FLEXERIL    180 tablet    1-2 tabs at bedtime as needed fibromyalgia muscle pain    Fibromyalgia       estradiol 0.05 MG/24HR BIW patch    VIVELLE-DOT    24 patch    Place 1 patch onto the skin twice a week    Symptomatic menopausal or female climacteric states       * gabapentin 100 MG capsule    NEURONTIN    180 capsule    1-2 capsules in AM daily as  needed for pain    Fibromyalgia       * gabapentin 300 MG capsule    NEURONTIN    180 capsule    2 capsule daily in the PM for pain    Fibromyalgia       ibuprofen 200 MG capsule     100 capsule    Take 200 mg by mouth every 6 hours as needed for fever    Fibromyalgia       melatonin 3 MG tablet     100 tablet    Take 1 tablet (3 mg) by mouth nightly as needed for sleep    Insomnia, unspecified type       * order for DME     1 Device    TENS unit. Use daily as needed for musculoskeletal pain    Fibromyalgia, Chronic pain syndrome       * order for DME     1 Package    Orthotics for both feet    Plantar fasciitis       pantoprazole 40 MG EC tablet    PROTONIX    90 tablet    Take 1 tablet (40 mg) by mouth daily    Gastroesophageal reflux disease without esophagitis       ranitidine 150 MG tablet    ZANTAC    60 tablet    TAKE ONE TABLET BY MOUTH TWICE DAILY    Gastroesophageal reflux disease without esophagitis       sucralfate 1 GM tablet    CARAFATE    40 tablet    Take 1 tablet (1 g) by mouth 4 times daily    Abdominal pain, epigastric       traMADol 50 MG tablet    ULTRAM    120 tablet    TAKE ONE TO TWO TABLETS TWICE DAILY AS NEEDED FOR PAIN    Fibromyalgia       * Notice:  This list has 4 medication(s) that are the same as other medications prescribed for you. Read the directions carefully, and ask your doctor or other care provider to review them with you.

## 2017-11-17 ENCOUNTER — THERAPY VISIT (OUTPATIENT)
Dept: PHYSICAL THERAPY | Facility: CLINIC | Age: 57
End: 2017-11-17
Payer: COMMERCIAL

## 2017-11-17 DIAGNOSIS — M54.2 CERVICALGIA: ICD-10-CM

## 2017-11-17 DIAGNOSIS — R10.13 ABDOMINAL PAIN, EPIGASTRIC: ICD-10-CM

## 2017-11-17 DIAGNOSIS — M54.50 CHRONIC BILATERAL LOW BACK PAIN WITHOUT SCIATICA: ICD-10-CM

## 2017-11-17 DIAGNOSIS — G89.29 CHRONIC BILATERAL LOW BACK PAIN WITHOUT SCIATICA: ICD-10-CM

## 2017-11-17 PROCEDURE — 97112 NEUROMUSCULAR REEDUCATION: CPT | Mod: GP | Performed by: PHYSICAL THERAPIST

## 2017-11-17 PROCEDURE — 97110 THERAPEUTIC EXERCISES: CPT | Mod: GP | Performed by: PHYSICAL THERAPIST

## 2017-11-20 LAB
H PYLORI AG STL QL IA: NORMAL
SPECIMEN SOURCE: NORMAL

## 2017-11-29 ENCOUNTER — HOSPITAL ENCOUNTER (OUTPATIENT)
Dept: ULTRASOUND IMAGING | Facility: CLINIC | Age: 57
Discharge: HOME | End: 2017-11-29
Attending: INTERNAL MEDICINE | Admitting: INTERNAL MEDICINE
Payer: COMMERCIAL

## 2017-11-29 ENCOUNTER — THERAPY VISIT (OUTPATIENT)
Dept: PHYSICAL THERAPY | Facility: CLINIC | Age: 57
End: 2017-11-29
Payer: COMMERCIAL

## 2017-11-29 DIAGNOSIS — M54.50 CHRONIC BILATERAL LOW BACK PAIN WITHOUT SCIATICA: ICD-10-CM

## 2017-11-29 DIAGNOSIS — M54.2 CERVICALGIA: ICD-10-CM

## 2017-11-29 DIAGNOSIS — R10.13 ABDOMINAL PAIN, EPIGASTRIC: ICD-10-CM

## 2017-11-29 DIAGNOSIS — G89.29 CHRONIC BILATERAL LOW BACK PAIN WITHOUT SCIATICA: ICD-10-CM

## 2017-11-29 PROCEDURE — 97112 NEUROMUSCULAR REEDUCATION: CPT | Mod: GP | Performed by: PHYSICAL THERAPIST

## 2017-11-29 PROCEDURE — 76705 ECHO EXAM OF ABDOMEN: CPT

## 2017-11-29 PROCEDURE — 97110 THERAPEUTIC EXERCISES: CPT | Mod: GP | Performed by: PHYSICAL THERAPIST

## 2017-11-29 NOTE — MR AVS SNAPSHOT
After Visit Summary   11/29/2017    Goldie Paul    MRN: [id number]           Patient Information     Date Of Birth          1960        Visit Information        Provider Department      11/29/2017 10:50 AM Naga Willson PT [facility]        Today's Diagnoses     Chronic bilateral low back pain without sciatica        Cervicalgia           Follow-ups after your visit        Who to contact     If you have questions or need follow up information about today's clinic visit or your schedule please contact [facility] [place] directly at [phone].  Normal or non-critical lab and imaging results will be communicated to you by Peepsqueeze Inchart, letter or phone within 4 business days after the clinic has received the results. If you do not hear from us within 7 days, please contact the clinic through [employer] or phone. If you have a critical or abnormal lab result, we will notify you by phone as soon as possible.  Submit refill requests through Veeker or call your pharmacy and they will forward the refill request to us. Please allow 3 business days for your refill to be completed.          Additional Information About Your Visit        MyChart Information     Veeker gives you secure access to your electronic health record. If you see a primary care provider, you can also send messages to your care team and make appointments. If you have questions, please call your primary care clinic.  If you do not have a primary care provider, please call [phone] and they will assist you.        Care EveryWhere ID     This is your Care EveryWhere ID. This could be used by other organizations to access your [place] medical records  [id number]         Blood Pressure from Last 3 Encounters:   11/16/17 110/82   09/05/17 110/70   03/29/17 116/72    Weight from Last 3 Encounters:   11/16/17 57.4 kg (126 lb 8 oz)   09/05/17 58.5 kg (129 lb)   03/29/17 55.8 kg (123 lb)               We Performed the Following     NEUROMUSCULAR RE-EDUCATION     THERAPEUTIC EXERCISES        Primary Care Provider Office Phone # Fax #    Misbah Negrete [phone]       [address] [place] [address]        Equal Access to Services     KAILA LOMBARDO : Hadnilsa silvia ku nerissao Soomaali, waaxda luqadaha, qaybta kaalmada adelcyada, nicki kaba laXinrd robert. So [facility] [phone].    ATENCIÓN: Si habla español, tiene a cloud disposición servicios gratuitos de asistencia lingüística. Llame al [phone].    We comply with applicable federal civil rights laws and [place] laws. We do not discriminate on the basis of race, color, national origin, age, disability, sex, sexual orientation, or gender identity.            Thank you!     Thank you for choosing [facility]  for your care. Our goal is always to provide you with excellent care. Hearing back from our patients is one way we can continue to improve our services. Please take a few minutes to complete the written survey that you may receive in the mail after your visit with us. Thank you!             Your Updated Medication List - Protect others around you: Learn how to safely use, store and throw away your medicines at www.disposemymeds.org.          This list is accurate as of: 11/29/17  2:20 PM.  Always use your most recent med list.                   Brand Name Dispense Instructions for use Diagnosis    cyclobenzaprine 5 MG tablet    FLEXERIL    180 tablet    1-2 tabs at bedtime as needed fibromyalgia muscle pain    Fibromyalgia       estradiol 0.05 MG/24HR BIW patch    VIVELLE-DOT    24 patch    Place 1 patch onto the skin twice a week    Symptomatic menopausal or female climacteric states       * gabapentin 100 MG capsule    NEURONTIN    180 capsule    1-2 capsules in AM daily as needed for pain    Fibromyalgia       * gabapentin 300 MG capsule    NEURONTIN    180 capsule    2 capsule daily in  the PM for pain    Fibromyalgia       ibuprofen 200 MG capsule     100 capsule    Take 200 mg by mouth every 6 hours as needed for fever    Fibromyalgia       melatonin 3 MG tablet     100 tablet    Take 1 tablet (3 mg) by mouth nightly as needed for sleep    Insomnia, unspecified type       * order for DME     1 Device    TENS unit. Use daily as needed for musculoskeletal pain    Fibromyalgia, Chronic pain syndrome       * order for DME     1 Package    Orthotics for both feet    Plantar fasciitis       pantoprazole 40 MG EC tablet    PROTONIX    90 tablet    Take 1 tablet (40 mg) by mouth daily    Gastroesophageal reflux disease without esophagitis       ranitidine 150 MG tablet    ZANTAC    60 tablet    TAKE ONE TABLET BY MOUTH TWICE DAILY    Gastroesophageal reflux disease without esophagitis       sucralfate 1 GM tablet    CARAFATE    40 tablet    Take 1 tablet (1 g) by mouth 4 times daily    Abdominal pain, epigastric       traMADol 50 MG tablet    ULTRAM    120 tablet    TAKE ONE TO TWO TABLETS TWICE DAILY AS NEEDED FOR PAIN    Fibromyalgia       * Notice:  This list has 4 medication(s) that are the same as other medications prescribed for you. Read the directions carefully, and ask your doctor or other care provider to review them with you.

## 2017-12-11 DIAGNOSIS — K21.9 GASTROESOPHAGEAL REFLUX DISEASE WITHOUT ESOPHAGITIS: ICD-10-CM

## 2017-12-11 DIAGNOSIS — M79.7 FIBROMYALGIA: ICD-10-CM

## 2017-12-12 RX ORDER — PANTOPRAZOLE SODIUM 40 MG/1
TABLET, DELAYED RELEASE ORAL
Qty: 30 TABLET | Refills: 2 | Status: SHIPPED | OUTPATIENT
Start: 2017-12-12 | End: 2018-08-22

## 2017-12-13 RX ORDER — GABAPENTIN 100 MG/1
CAPSULE ORAL
Qty: 180 CAPSULE | Refills: 3 | Status: SHIPPED | OUTPATIENT
Start: 2017-12-13 | End: 2017-12-22 | Stop reason: ALTCHOICE

## 2017-12-13 RX ORDER — GABAPENTIN 300 MG/1
CAPSULE ORAL
Qty: 180 CAPSULE | Refills: 3 | Status: SHIPPED | OUTPATIENT
Start: 2017-12-13 | End: 2017-12-22 | Stop reason: ALTCHOICE

## 2017-12-19 ENCOUNTER — TRANSFERRED RECORDS (OUTPATIENT)
Dept: HEALTH INFORMATION MANAGEMENT | Facility: CLINIC | Age: 57
End: 2017-12-19

## 2017-12-20 DIAGNOSIS — N95.1 SYMPTOMATIC MENOPAUSAL OR FEMALE CLIMACTERIC STATES: ICD-10-CM

## 2017-12-20 NOTE — LETTER
[facility]  [address] [id number] [phone]            Goldie Paul  [address] [place] [id number]        December 21, 2017    Dear Goldie,    While refilling your prescription today, we noticed that you are due for a mammogram. .  We will refill your prescription for 30 days, but a follow-up appointment must be made before any additional refills can be approved.     Taking care of your health is important to us and we look forward to seeing you in the near future.  Please call us at [phone] or [phone] (or use Vizsafe) to schedule an appointment.     Please disregard this notice if you have already made an appointment.    Sincerely,        [facility]

## 2017-12-21 RX ORDER — ESTRADIOL 0.05 MG/D
PATCH, EXTENDED RELEASE TRANSDERMAL
Qty: 8 PATCH | Refills: 0 | Status: SHIPPED | OUTPATIENT
Start: 2017-12-21 | End: 2017-12-22

## 2017-12-22 ENCOUNTER — OFFICE VISIT (OUTPATIENT)
Dept: INTERNAL MEDICINE | Facility: CLINIC | Age: 57
End: 2017-12-22
Payer: COMMERCIAL

## 2017-12-22 ENCOUNTER — TELEPHONE (OUTPATIENT)
Dept: INTERNAL MEDICINE | Facility: CLINIC | Age: 57
End: 2017-12-22

## 2017-12-22 VITALS
WEIGHT: 129 LBS | SYSTOLIC BLOOD PRESSURE: 110 MMHG | HEART RATE: 86 BPM | BODY MASS INDEX: 22.85 KG/M2 | TEMPERATURE: 98.2 F | DIASTOLIC BLOOD PRESSURE: 76 MMHG | OXYGEN SATURATION: 97 %

## 2017-12-22 DIAGNOSIS — N95.1 SYMPTOMATIC MENOPAUSAL OR FEMALE CLIMACTERIC STATES: ICD-10-CM

## 2017-12-22 DIAGNOSIS — Z13.6 CARDIOVASCULAR SCREENING; LDL GOAL LESS THAN 130: ICD-10-CM

## 2017-12-22 DIAGNOSIS — M54.2 CERVICALGIA: ICD-10-CM

## 2017-12-22 DIAGNOSIS — R10.13 EPIGASTRIC PAIN: ICD-10-CM

## 2017-12-22 DIAGNOSIS — G62.9 NEUROPATHY: ICD-10-CM

## 2017-12-22 DIAGNOSIS — K75.9 HEPATITIS: ICD-10-CM

## 2017-12-22 DIAGNOSIS — M79.7 FIBROMYALGIA: Primary | ICD-10-CM

## 2017-12-22 DIAGNOSIS — Z12.11 SPECIAL SCREENING FOR MALIGNANT NEOPLASMS, COLON: ICD-10-CM

## 2017-12-22 LAB
ALBUMIN SERPL-MCNC: 3.6 G/DL (ref 3.4–5)
ALP SERPL-CCNC: 102 U/L (ref 40–150)
ALT SERPL W P-5'-P-CCNC: 132 U/L (ref 0–50)
ANION GAP SERPL CALCULATED.3IONS-SCNC: 5 MMOL/L (ref 3–14)
AST SERPL W P-5'-P-CCNC: 64 U/L (ref 0–45)
BILIRUB SERPL-MCNC: 0.3 MG/DL (ref 0.2–1.3)
BUN SERPL-MCNC: 9 MG/DL (ref 7–30)
CALCIUM SERPL-MCNC: 8.8 MG/DL (ref 8.5–10.1)
CHLORIDE SERPL-SCNC: 105 MMOL/L (ref 94–109)
CHOLEST SERPL-MCNC: 221 MG/DL
CO2 SERPL-SCNC: 29 MMOL/L (ref 20–32)
CREAT SERPL-MCNC: 0.5 MG/DL (ref 0.52–1.04)
ERYTHROCYTE [DISTWIDTH] IN BLOOD BY AUTOMATED COUNT: 13.2 % (ref 10–15)
GFR SERPL CREATININE-BSD FRML MDRD: >90 ML/MIN/1.7M2
GLUCOSE SERPL-MCNC: 90 MG/DL (ref 70–99)
HCT VFR BLD AUTO: 43.1 % (ref 35–47)
HDLC SERPL-MCNC: 68 MG/DL
HGB BLD-MCNC: 14.3 G/DL (ref 11.7–15.7)
LDLC SERPL CALC-MCNC: 127 MG/DL
MCH RBC QN AUTO: 30.1 PG (ref 26.5–33)
MCHC RBC AUTO-ENTMCNC: 33.2 G/DL (ref 31.5–36.5)
MCV RBC AUTO: 91 FL (ref 78–100)
NONHDLC SERPL-MCNC: 153 MG/DL
PLATELET # BLD AUTO: 277 10E9/L (ref 150–450)
POTASSIUM SERPL-SCNC: 4.5 MMOL/L (ref 3.4–5.3)
PROT SERPL-MCNC: 7.2 G/DL (ref 6.8–8.8)
RBC # BLD AUTO: 4.75 10E12/L (ref 3.8–5.2)
SODIUM SERPL-SCNC: 139 MMOL/L (ref 133–144)
TRIGL SERPL-MCNC: 131 MG/DL
VIT B12 SERPL-MCNC: 534 PG/ML (ref 193–986)
WBC # BLD AUTO: 6.6 10E9/L (ref 4–11)

## 2017-12-22 PROCEDURE — 80053 COMPREHEN METABOLIC PANEL: CPT | Performed by: INTERNAL MEDICINE

## 2017-12-22 PROCEDURE — 99214 OFFICE O/P EST MOD 30 MIN: CPT | Performed by: INTERNAL MEDICINE

## 2017-12-22 PROCEDURE — 84165 PROTEIN E-PHORESIS SERUM: CPT | Performed by: INTERNAL MEDICINE

## 2017-12-22 PROCEDURE — 00000402 ZZHCL STATISTIC TOTAL PROTEIN: Performed by: INTERNAL MEDICINE

## 2017-12-22 PROCEDURE — 82607 VITAMIN B-12: CPT | Performed by: INTERNAL MEDICINE

## 2017-12-22 PROCEDURE — 85027 COMPLETE CBC AUTOMATED: CPT | Performed by: INTERNAL MEDICINE

## 2017-12-22 PROCEDURE — 80061 LIPID PANEL: CPT | Performed by: INTERNAL MEDICINE

## 2017-12-22 PROCEDURE — 36415 COLL VENOUS BLD VENIPUNCTURE: CPT | Performed by: INTERNAL MEDICINE

## 2017-12-22 RX ORDER — ESTRADIOL 0.05 MG/D
PATCH, EXTENDED RELEASE TRANSDERMAL
Qty: 24 PATCH | Refills: 3 | Status: SHIPPED | OUTPATIENT
Start: 2017-12-22 | End: 2018-12-12

## 2017-12-22 RX ORDER — PREGABALIN 75 MG/1
75 CAPSULE ORAL 2 TIMES DAILY
Qty: 60 CAPSULE | Refills: 5 | Status: SHIPPED | OUTPATIENT
Start: 2017-12-22 | End: 2018-06-05

## 2017-12-22 ASSESSMENT — ANXIETY QUESTIONNAIRES
7. FEELING AFRAID AS IF SOMETHING AWFUL MIGHT HAPPEN: NOT AT ALL
6. BECOMING EASILY ANNOYED OR IRRITABLE: SEVERAL DAYS
IF YOU CHECKED OFF ANY PROBLEMS ON THIS QUESTIONNAIRE, HOW DIFFICULT HAVE THESE PROBLEMS MADE IT FOR YOU TO DO YOUR WORK, TAKE CARE OF THINGS AT HOME, OR GET ALONG WITH OTHER PEOPLE: NOT DIFFICULT AT ALL
3. WORRYING TOO MUCH ABOUT DIFFERENT THINGS: NOT AT ALL
1. FEELING NERVOUS, ANXIOUS, OR ON EDGE: NOT AT ALL
5. BEING SO RESTLESS THAT IT IS HARD TO SIT STILL: NOT AT ALL
2. NOT BEING ABLE TO STOP OR CONTROL WORRYING: NOT AT ALL
GAD7 TOTAL SCORE: 1

## 2017-12-22 ASSESSMENT — PATIENT HEALTH QUESTIONNAIRE - PHQ9
SUM OF ALL RESPONSES TO PHQ QUESTIONS 1-9: 0
5. POOR APPETITE OR OVEREATING: NOT AT ALL

## 2017-12-22 NOTE — TELEPHONE ENCOUNTER
"12/22/2017        Patient Communication Preferences indicate  Do not contact  and/or communication by \"Phone\" is not preferred. Call not required per Outreach team.        Outreach ,  Víctor Haddad     "

## 2017-12-22 NOTE — NURSING NOTE
"Chief Complaint   Patient presents with     Results     for abdominal ultrasound      Discuss treament for Epigastric Pain       Initial /76  Pulse 86  Temp 98.2  F (36.8  C) (Oral)  Wt 129 lb (58.5 kg)  SpO2 97%  BMI 22.85 kg/m2 Estimated body mass index is 22.85 kg/(m^2) as calculated from the following:    Height as of 11/16/17: 5' 3\" (1.6 m).    Weight as of this encounter: 129 lb (58.5 kg).  Medication Reconciliation: complete  "

## 2017-12-22 NOTE — PROGRESS NOTES
SUBJECTIVE:   Goldie Paul is a 57 year old female who presents to clinic today for the following health issues:    Chief Complaint   Patient presents with     Results     for abdominal ultrasound      Discuss treament for Epigastric Pain     Pt's past medical history, family history, habits, medications and allergies were reviewed with the patient today.  See snap shot for  HCM status. Most recent lab results reviewed with pt. Problem list and histories reviewed & adjusted, as indicated.  Additional history as below:    Seen 1 month ago for epigastric pain at another clinic.  H pylori test negative.  No other lab testing done then.  Right upper quadrant ultrasound showed fatty liver infiltration and otherwise negative.  Patient continues to have occasional epigastric pain.  Denies seeing blood in her stools.  No nausea or vomiting.  On PPI therapy.  Taking ibuprofen 2 tablets per day.  Has continued chronic fibromyalgia pain in generalized musculature.  On gabapentin.  Has had some lightheadedness side effects when using higher doses.  Also some pain bilateral neck musculature.  No radiation of pain into the upper extremities.  Mild chronic tingling in bilateral feet mostly when standing for prolonged periods of time.  Not occurring in bed or with sitting.  Denies numbness other parts of the lower extremities.  No bowel or bladder incontinence.  No weakness in the extremities.  Occasional soreness in the arches of the feet.  Due for colon cancer screening.  Bowel movements normal in consistency.  No exertional chest pain or shortness of breath.  Abdominal symptoms not changed with eating or bowel movements    Additional ROS:   Constitutional, HEENT, Cardiovascular, Pulmonary, GI and , Neuro, MSK and Psych review of systems/symptoms are otherwise negative or unchanged from previous, except as noted above.      OBJECTIVE:  /76  Pulse 86  Temp 98.2  F (36.8  C) (Oral)  Wt 129 lb (58.5 kg)  SpO2 97%   "BMI 22.85 kg/m2   Estimated body mass index is 22.85 kg/(m^2) as calculated from the following:    Height as of 11/16/17: 5' 3\" (1.6 m).    Weight as of this encounter: 129 lb (58.5 kg).  Eye: PERRL, EOMI  HENT: ear canals and TM's normal and nose and mouth without ulcers or lesions   Neck: no adenopathy. Thyroid normal to palpation. No bruits  Pulm: Lungs clear to auscultation   CV: Regular rates and rhythm  GI: Soft, mildly tender to deep palpation epigastric area without rebound or guarding.   Normal active bowel sounds, No hepatosplenomegaly or masses palpable  Ext: Peripheral pulses intact. No edema.  High arches of the feet bilaterally  Neuro: Normal strength and tone, sensory exam grossly normal to light touch sensation and vibration testing despite subjective symptoms  MSK: Mild tenderness to deep palpation global fibromyalgia trigger points bilaterally.  Mild tenderness to palpation bilateral paracervical musculature.  Neck range of motion intact. No nucchal rigidity  Rectal: Stool guaiac negative and brown    Assessment/Plan: (See plan discussion below for further details)  1. Fibromyalgia  Symptoms not controlled.  Previous lab work negative for inflammatory process negative.  See plan discussion below  - pregabalin (LYRICA) 75 MG capsule; Take 1 capsule (75 mg) by mouth 2 times daily  Dispense: 60 capsule; Refill: 5    2. Epigastric pain  Continue PPI.  Stop ibuprofen.  Lab is ordered  - Comprehensive metabolic panel    3. Cervicalgia  See plan discussion below.     4. Special screening for malignant neoplasms, colon  Due for colonoscopy screening  - GASTROENTEROLOGY ADULT REF PROCEDURE ONLY    5. Symptomatic menopausal or female climacteric states  Stable symptom control.  Continue medication. Hx DAVID/BSO  - estradiol (VIVELLE-DOT) 0.05 MG/24HR BIW patch; APPLY ONE PATCH EXTERNALLY to skin TWICE WEEKLY  Dispense: 24 patch; Refill: 3    6. Neuropathy  Labs ordered.  Possible compressive neuropathy in the " feet rather than lumbar etiology given history and exam  - CBC with platelets  - Vitamin B12  - Protein electrophoresis  - Comprehensive metabolic panel    7. CARDIOVASCULAR SCREENING; LDL GOAL LESS THAN 130  Lab screening as ordered  - Lipid panel reflex to direct LDL Fasting        Plan discussion:   Stop Gabapentin  Lyrica 75mg capsule, 1 capsule at bedtime for 2 days. Then 1 capsule twice a day for fibromyalgia pain  Email me update in 3 weeks re: response or earlier if  side effects with new med  Try to avoid use of Ibuprofen as much as possible to protect stomach  Heat/stretching neck in AM. Emal if wish to see physical therapy  Labs for neuropathy  If recurrent epigastric pains off of Ibuprofen, will arrange for endoscopy vs CT  Colonoscopy  with  [facility]. They will call to schedule. If not heard from them in 1 week, then call [phone].   Must be off of Ibuprofen for 1 week prior to the colonoscopy (do after 1/1/18)    Addendum 12/22/17: Labs back and show hepatitis. Last liver lab in chart 2012 and normal then.  Spoke with pt. Stop Ibuprofen. Will continue other meds for now. Pt will have NF lab mid next week. Further management will be guided by results       Misbah Negrete MD  Internal Medicine Department  [facility]

## 2017-12-22 NOTE — PATIENT INSTRUCTIONS
Stop Gabapentin  Lyrica 75mg capsule, 1 capsule at bedtime for 2 days. Then 1 capsule twice a day for fibromyalgia pain  Email me update in 3 weeks re: response or earlier if  side effects with new med  Try to avoid use of Ibuprofen as much as possible to protect stomach  Heat/stretching neck in AM. Emal if wish to see physical therapy  Labs for neuropathy  If recurrent epigastric pains off of Ibuprofen, will arrange for endoscopy vs CT  Colonoscopy  with  [facility]. They will call to schedule. If not heard from them in 1 week, then call [phone].   Must be off of Ibuprofen for ` week prior to the colonoscopy (do after 1/1/18)

## 2017-12-22 NOTE — MR AVS SNAPSHOT
After Visit Summary   12/22/2017    Goldie Paul    MRN: [id number]           Patient Information     Date Of Birth          1960        Visit Information        Provider Department      12/22/2017 10:00 AM Misbah Negrete MD [facility]        Today's Diagnoses     Fibromyalgia    -  1    Epigastric pain        Cervicalgia        Special screening for malignant neoplasms, colon        Symptomatic menopausal or female climacteric states        Neuropathy        CARDIOVASCULAR SCREENING; LDL GOAL LESS THAN 130        Hepatitis          Care Instructions     Stop Gabapentin  Lyrica 75mg capsule, 1 capsule at bedtime for 2 days. Then 1 capsule twice a day for fibromyalgia pain  Email me update in 3 weeks re: response or earlier if  side effects with new med  Try to avoid use of Ibuprofen as much as possible to protect stomach  Heat/stretching neck in AM. Emal if wish to see physical therapy  Labs for neuropathy  If recurrent epigastric pains off of Ibuprofen, will arrange for endoscopy vs CT  Colonoscopy  with  [facility]. They will call to schedule. If not heard from them in 1 week, then call [phone].   Must be off of Ibuprofen for ` week prior to the colonoscopy (do after 1/1/18)                 Follow-ups after your visit        Additional Services     GASTROENTEROLOGY ADULT REF PROCEDURE ONLY                 Who to contact     If you have questions or need follow up information about today's clinic visit or your schedule please contact [facility] directly at [phone].  Normal or non-critical lab and imaging results will be communicated to you by MyChart, letter or phone within 4 business days after the clinic has received the results. If you do not hear from us within 7 days, please contact the clinic through MyChart or phone. If you have a critical or abnormal lab result, we will notify you by phone as soon as  possible.  Submit refill requests through [employer]arg or call your pharmacy and they will forward the refill request to us. Please allow 3 business days for your refill to be completed.          Additional Information About Your Visit        [email or web address] Information     [employer] gives you secure access to your electronic health record. If you see a primary care provider, you can also send messages to your care team and make appointments. If you have questions, please call your primary care clinic.  If you do not have a primary care provider, please call [phone] and they will assist you.        Care EveryWhere ID     This is your Care EveryWhere ID. This could be used by other organizations to access your [place] medical records  [id number]        Your Vitals Were     Pulse Temperature Pulse Oximetry BMI (Body Mass Index)          86 98.2  F (36.8  C) (Oral) 97% 22.85 kg/m2         Blood Pressure from Last 3 Encounters:   12/22/17 110/76   11/16/17 110/82   09/05/17 110/70    Weight from Last 3 Encounters:   12/22/17 129 lb (58.5 kg)   11/16/17 126 lb 8 oz (57.4 kg)   09/05/17 129 lb (58.5 kg)              We Performed the Following     CBC with platelets     Comprehensive metabolic panel     GASTROENTEROLOGY ADULT REF PROCEDURE ONLY     Lipid panel reflex to direct LDL Fasting     Protein electrophoresis     Vitamin B12          Today's Medication Changes          These changes are accurate as of: 12/22/17 11:59 PM.  If you have any questions, ask your nurse or doctor.               Start taking these medicines.        Dose/Directions    pregabalin 75 MG capsule   Commonly known as:  LYRICA   Used for:  Fibromyalgia   Started by:  Misbah Negrete MD        Dose:  75 mg   Take 1 capsule (75 mg) by mouth 2 times daily   Quantity:  60 capsule   Refills:  5         These medicines have changed or have updated prescriptions.        Dose/Directions    estradiol 0.05 MG/24HR BIW patch   Commonly known as:  VIVELLE-DOT   This  may have changed:  See the new instructions.   Used for:  Symptomatic menopausal or female climacteric states   Changed by:  Misbah Negrete MD        APPLY ONE PATCH EXTERNALLY to skin TWICE WEEKLY   Quantity:  24 patch   Refills:  3         Stop taking these medicines if you haven't already. Please contact your care team if you have questions.     gabapentin 100 MG capsule   Commonly known as:  NEURONTIN   Stopped by:  Misbah Negrete MD           gabapentin 300 MG capsule   Commonly known as:  NEURONTIN   Stopped by:  Misbah Negrete MD           ibuprofen 200 MG capsule   Stopped by:  Misbah Negrete MD           sucralfate 1 GM tablet   Commonly known as:  CARAFATE   Stopped by:  Misbah Negrete MD                Where to get your medicines      These medications were sent to [facility] [address] [place] [address]     Phone:  [phone]     estradiol 0.05 MG/24HR BIW patch         Some of these will need a paper prescription and others can be bought over the counter.  Ask your nurse if you have questions.     Bring a paper prescription for each of these medications     pregabalin 75 MG capsule                Primary Care Provider Office Phone # Fax #    Misbah Negrete [phone]       [address] [place] [address]        Equal Access to Services     KAILA LOMBARDO AH: Hadii silvia ku hadasho Soomaali, waaxda luqadaha, qaybta kaalmada adeegyada, waxay babakin hayrd robert. So [facility] [phone].    ATENCIÓN: Si habla español, tiene a cloud disposición servicios gratuitos de asistencia lingüística. Llame al [phone].    We comply with applicable federal civil rights laws and [place] laws. We do not discriminate on the basis of race, color, national origin, age, disability, sex, sexual orientation, or gender identity.            Thank you!     Thank you for choosing [facility]  for your care. Our goal is always to  provide you with excellent care. Hearing back from our patients is one way we can continue to improve our services. Please take a few minutes to complete the written survey that you may receive in the mail after your visit with us. Thank you!             Your Updated Medication List - Protect others around you: Learn how to safely use, store and throw away your medicines at www.disposemymeds.org.          This list is accurate as of: 12/22/17 11:59 PM.  Always use your most recent med list.                   Brand Name Dispense Instructions for use Diagnosis    cyclobenzaprine 5 MG tablet    FLEXERIL    180 tablet    1-2 tabs at bedtime as needed fibromyalgia muscle pain    Fibromyalgia       estradiol 0.05 MG/24HR BIW patch    VIVELLE-DOT    24 patch    APPLY ONE PATCH EXTERNALLY to skin TWICE WEEKLY    Symptomatic menopausal or female climacteric states       melatonin 3 MG tablet     100 tablet    Take 1 tablet (3 mg) by mouth nightly as needed for sleep    Insomnia, unspecified type       * order for DME     1 Device    TENS unit. Use daily as needed for musculoskeletal pain    Fibromyalgia, Chronic pain syndrome       * order for DME     1 Package    Orthotics for both feet    Plantar fasciitis       pantoprazole 40 MG EC tablet    PROTONIX    30 tablet    TAKE ONE TABLET BY MOUTH ONE TIME DAILY    Gastroesophageal reflux disease without esophagitis       pregabalin 75 MG capsule    LYRICA    60 capsule    Take 1 capsule (75 mg) by mouth 2 times daily    Fibromyalgia       ranitidine 150 MG tablet    ZANTAC    60 tablet    TAKE ONE TABLET BY MOUTH TWICE DAILY    Gastroesophageal reflux disease without esophagitis       traMADol 50 MG tablet    ULTRAM    120 tablet    TAKE ONE TO TWO TABLETS TWICE DAILY AS NEEDED FOR PAIN    Fibromyalgia       * Notice:  This list has 2 medication(s) that are the same as other medications prescribed for you. Read the directions carefully, and ask your doctor or other care  provider to review them with you.

## 2017-12-23 ASSESSMENT — ANXIETY QUESTIONNAIRES: GAD7 TOTAL SCORE: 1

## 2017-12-26 LAB
ALBUMIN SERPL ELPH-MCNC: 4.2 G/DL (ref 3.7–5.1)
ALPHA1 GLOB SERPL ELPH-MCNC: 0.3 G/DL (ref 0.2–0.4)
ALPHA2 GLOB SERPL ELPH-MCNC: 0.6 G/DL (ref 0.5–0.9)
B-GLOBULIN SERPL ELPH-MCNC: 0.8 G/DL (ref 0.6–1)
GAMMA GLOB SERPL ELPH-MCNC: 1.1 G/DL (ref 0.7–1.6)
M PROTEIN SERPL ELPH-MCNC: 0 G/DL
PROT PATTERN SERPL ELPH-IMP: NORMAL

## 2017-12-27 ENCOUNTER — MYC MEDICAL ADVICE (OUTPATIENT)
Dept: INTERNAL MEDICINE | Facility: CLINIC | Age: 57
End: 2017-12-27

## 2017-12-28 DIAGNOSIS — K75.9 HEPATITIS: ICD-10-CM

## 2017-12-28 LAB
ALBUMIN SERPL-MCNC: 3.6 G/DL (ref 3.4–5)
ALP SERPL-CCNC: 89 U/L (ref 40–150)
ALT SERPL W P-5'-P-CCNC: 50 U/L (ref 0–50)
AST SERPL W P-5'-P-CCNC: 17 U/L (ref 0–45)
BILIRUB DIRECT SERPL-MCNC: <0.1 MG/DL (ref 0–0.2)
BILIRUB SERPL-MCNC: 0.4 MG/DL (ref 0.2–1.3)
ERYTHROCYTE [SEDIMENTATION RATE] IN BLOOD BY WESTERGREN METHOD: 6 MM/H (ref 0–30)
FERRITIN SERPL-MCNC: 40 NG/ML (ref 8–252)
IRON SATN MFR SERPL: 33 % (ref 15–46)
IRON SERPL-MCNC: 103 UG/DL (ref 35–180)
PROT SERPL-MCNC: 7 G/DL (ref 6.8–8.8)
TIBC SERPL-MCNC: 309 UG/DL (ref 240–430)
TSH SERPL DL<=0.005 MIU/L-ACNC: 1.9 MU/L (ref 0.4–4)

## 2017-12-28 PROCEDURE — 83550 IRON BINDING TEST: CPT | Performed by: INTERNAL MEDICINE

## 2017-12-28 PROCEDURE — 82728 ASSAY OF FERRITIN: CPT | Performed by: INTERNAL MEDICINE

## 2017-12-28 PROCEDURE — 85652 RBC SED RATE AUTOMATED: CPT | Performed by: INTERNAL MEDICINE

## 2017-12-28 PROCEDURE — 80076 HEPATIC FUNCTION PANEL: CPT | Performed by: INTERNAL MEDICINE

## 2017-12-28 PROCEDURE — 36415 COLL VENOUS BLD VENIPUNCTURE: CPT | Performed by: INTERNAL MEDICINE

## 2017-12-28 PROCEDURE — 84443 ASSAY THYROID STIM HORMONE: CPT | Performed by: INTERNAL MEDICINE

## 2017-12-28 PROCEDURE — 87340 HEPATITIS B SURFACE AG IA: CPT | Performed by: INTERNAL MEDICINE

## 2017-12-28 PROCEDURE — 83540 ASSAY OF IRON: CPT | Performed by: INTERNAL MEDICINE

## 2017-12-29 LAB — HBV SURFACE AG SERPL QL IA: NONREACTIVE

## 2018-01-01 ENCOUNTER — MYC MEDICAL ADVICE (OUTPATIENT)
Dept: INTERNAL MEDICINE | Facility: CLINIC | Age: 58
End: 2018-01-01

## 2018-01-01 DIAGNOSIS — Z13.6 CARDIOVASCULAR SCREENING; LDL GOAL LESS THAN 130: Primary | ICD-10-CM

## 2018-01-03 ENCOUNTER — HOSPITAL ENCOUNTER (EMERGENCY)
Facility: CLINIC | Age: 58
Discharge: HOME | End: 2018-01-03
Attending: EMERGENCY MEDICINE | Admitting: EMERGENCY MEDICINE
Payer: COMMERCIAL

## 2018-01-03 ENCOUNTER — MYC MEDICAL ADVICE (OUTPATIENT)
Dept: INTERNAL MEDICINE | Facility: CLINIC | Age: 58
End: 2018-01-03

## 2018-01-03 VITALS
HEART RATE: 91 BPM | TEMPERATURE: 98 F | SYSTOLIC BLOOD PRESSURE: 154 MMHG | DIASTOLIC BLOOD PRESSURE: 99 MMHG | RESPIRATION RATE: 18 BRPM | OXYGEN SATURATION: 99 %

## 2018-01-03 DIAGNOSIS — H57.11 ACUTE RIGHT EYE PAIN: ICD-10-CM

## 2018-01-03 DIAGNOSIS — H15.101 SCLERITIS AND EPISCLERITIS OF RIGHT EYE: ICD-10-CM

## 2018-01-03 DIAGNOSIS — H15.001 SCLERITIS AND EPISCLERITIS OF RIGHT EYE: ICD-10-CM

## 2018-01-03 PROCEDURE — 99283 EMERGENCY DEPT VISIT LOW MDM: CPT

## 2018-01-03 RX ORDER — TETRACAINE HYDROCHLORIDE 5 MG/ML
2 SOLUTION OPHTHALMIC ONCE
Status: DISCONTINUED | OUTPATIENT
Start: 2018-01-03 | End: 2018-01-03 | Stop reason: HOSPADM

## 2018-01-03 RX ORDER — SOFT LENS RINSE,STORE SOLUTION
2 SOLUTION, NON-ORAL MISCELLANEOUS
Qty: 1 BOTTLE | Refills: 1 | Status: SHIPPED | OUTPATIENT
Start: 2018-01-03 | End: 2018-06-05

## 2018-01-03 ASSESSMENT — VISUAL ACUITY
OD: 20/40
OS: 20/30

## 2018-01-03 ASSESSMENT — ENCOUNTER SYMPTOMS
FEVER: 0
EYE PAIN: 1
EYE DISCHARGE: 0

## 2018-01-03 NOTE — ED PROVIDER NOTES
"  History     Chief Complaint:  Eye pain    HPI   Goldie Paul is a 57 year old female who presents to the emergency department today for evaluation of right eye pain. Yesterday, she noticed sharp, shooting right eye pain that feels like a \"pressure behind her eye\". Today, it has worsened with associated minor discharge prompting her visit to the emergency department. She denies vision changes, black spots, fever, contacts or glasses use. Of note, she started Lyrica a week ago. Denies other constitutional symptoms.    Allergies:   Amoxicillin    Medications:    pregabalin (LYRICA) 75 MG capsule  estradiol (VIVELLE-DOT) 0.05 MG/24HR BIW patch  pantoprazole (PROTONIX) 40 MG EC tablet  ranitidine (ZANTAC) 150 MG tablet  cyclobenzaprine (FLEXERIL) 5 MG tablet  traMADol (ULTRAM) 50 MG tablet  melatonin 3 MG tablet    Past Medical History:    Allergic rhinitis, cause unspecified   Disorders of bursae and tendons in shoulder region, unspecified   Endometriosis of other specified sites   Esophageal reflux   FIBROMYALGIA   Hyperlipidemia   Impingement syndrome of left shoulder   Insomnia   Lateral epicondylitis  of elbow 2/06  Plantar fascial fibromatosis   Polyneuropathy in other diseases classified elsewhere (H)   Radial styloid tenosynovitis   Sprain of hand, unspecified site  Uncomplicated asthma     Past Surgical History:    ABDOMEN SURGERY    APPENDECTOMY    C NONSPECIFIC PROCEDURE    C NONSPECIFIC PROCEDURE    DAVID, BSO for endometriosis    Family History:    Diabetes  Hypertension  Parkinsonism  Cancer    Social History:  The patient was alone.  Smoking Status: never  Smokeless Tobacco: never  Alcohol Use: no  Marital Status:      Review of Systems   Constitutional: Negative for fever.   Eyes: Positive for pain. Negative for discharge and visual disturbance.   All other systems reviewed and are negative.      Physical Exam   First Vitals:  BP: (!) 154/99  Pulse: 91  Heart Rate: 91  Temp: 98  F (36.7 "  C)  Resp: 18  SpO2: 99 %  Visual Acuity-Left: 20/30. Pressure 14  Visual Acuity-Right: 20/40. Pressure 17    Physical Exam  General: Resting comfortably on the gurney  Head:  The scalp, face, and head appear normal  Eyes:  The pupils are normal    Conjunctivae right is injected, limbic injection on right, sclera also injected. No foreign bodies with inversion of the right upper eyelid,. Pupils equal with light. Left conjunctivae and sclera appear normal  ENT:    The nose is normal    Ears/pinnae are normal  Neck:  Normal range of motion  Skin:  No rash or lesions noted.  Neuro: Speech is normal and fluent  Psych:  Awake. Alert.  Normal affect.      Appropriate interactions  Procedure Note:   Slit Lamp Examination of Right Eye  Performed by:  Dr. Fernando Carbajal  Indication:  Eye pain/discomfort    Tetracaine drops were used to anesthetize the affected eye.  Fluorescein staining was performed.  The eye was inspected under white and blue light, at low and high magnification    Findings:  Eyelids:  Normal  Cornea:  Normal, no fluorescein uptake  Iris:   Normal  Conjunctiva:  Injected, and scleral injection as well  Anterior Chamber: Normal, no evidence of cell and flare  Pupil:   Normal    Impression:     Conjunctival and scleral injection, conjunctivitis vs scleritis/episcleritis    Emergency Department Course   Emergency Department Course:  Nursing notes and vitals reviewed.  0648: I performed an exam of the patient as documented above.   0745: Patient rechecked and updated.   Findings and plan explained to the Patient. Patient discharged home with instructions regarding supportive care, medications, and reasons to return. The importance of close follow-up was reviewed. The patient was prescribed Oxygen permeable lens products.  I personally answered all related questions with the patient prior to discharge.    Impression & Plan    Medical Decision Making:  Goldie Paul is an otherwise healthy 57 year old female who  presents with pain and redness to the right eye with no vision change over the past 48 hours. Patient notes that the pain has been more severe in the last 24 hours. She notes no crusting or drainage suspicious for conjunctivitis. She has had conjunctivitis in the past and does not think this is that. She said no vision changes or black out at all. No concern for retinal detachment or vitreus hemorrhage. Patient has extremely injected sclera in the right eye and left eye appears normal. Intraocular pressures are normal bilaterally which is reassuring. This is unlikely glaucoma,. Patient with no acute vision changes, she does not wear contacts or glasses. On my sclera exam, patient does have significant injection to the sclera of the right eye. I do not see any anterior cell or flare in the anterior chamber. There is no fluorescent uptake over the cornea, no evidence of foreign body on the inversion of the upper eyelid. Reassuring this is unlikely foreign body or corneal abrasion. Low concern for Anterior uveitis or iritis with no evidence of cell and flare. Patient most likely with a conjunctivitis versus scleritis. I did recommend the patient follow up with her ophthalmologist Dr. Torres at [facility] today or tomorrow. If she is unable to get an appointment there, I referred her to [facility] for close opthalmology follow up in the next 24-48 hours. Will defer any steroids to opthalmology f/u.  She should use NSAIDs/ibuprofen for discomfort. We also prescribed some rewetting drops for comfort as needed. Patient understood close return precautions for change in vision or worsening pain. She will follow up with opthalmology directly after this emergency department visit. All questions answered prior to discharge. Discharged home.     Diagnosis:    ICD-10-CM    1. 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 Acute right eye pain H57.11        Disposition:  discharged to home with ophthalmology  follow up in 24 hrs     Discharge Medications:  New Prescriptions    OXYGEN PERMEABLE LENS PRODUCTS (SEBASTIAN REWETTING DROPS) SOLN    2 drops every 2 hours as needed 2 drops to right eye every 2 hours as needed         Scribe Disclosure:  IMaynor, am serving as a scribe at 6:48 AM on 1/3/2018 to document services personally performed by Fernando Carbajal MD based on my observations and the provider's statements to me.     1/3/2018   [facility] EMERGENCY DEPARTMENT       Fernando Carbajal MD  01/03/18 4332

## 2018-01-03 NOTE — ED AVS SNAPSHOT
[facility] Emergency Department    [address]    [facility] [address]    Phone:  [phone]    Fax:  [phone]                                       Goldie Paul   MRN: [id number]    Department:  [facility] Emergency Department   Date of Visit:  1/3/2018           After Visit Summary Signature Page     I have received my discharge instructions, and my questions have been answered. I have discussed any challenges I see with this plan with the nurse or doctor.    ..........................................................................................................................................  Patient/Patient Representative Signature      ..........................................................................................................................................  Patient Representative Print Name and Relationship to Patient    ..................................................               ................................................  Date                                            Time    ..........................................................................................................................................  Reviewed by Signature/Title    ...................................................              ..............................................  Date                                                            Time

## 2018-01-03 NOTE — DISCHARGE INSTRUCTIONS
Acute Pain, Uncertain Cause  Pain can be caused by many conditions that range from very minor to very serious. In some cases, though, pain comes and goes with no apparent cause.  We were not able to find the exact cause for your pain. At this time there is no sign of any serious illness causing your pain. More tests may be needed to determine the cause. In many cases, pain like this goes away by itself.  Home care  Take any medicines as prescribed. If another medicine was not prescribed for pain, you can take an over-the-counter pain medicine such as ibuprofen or acetaminophen. Use these as directed on the label.    Follow-up care  Follow up with your healthcare provider or our staff as directed.  When to seek medical advice  Call your healthcare provider for any of the following:    Pain changes in pattern    Pain doesn't lessen or gets worse    New symptoms appear    Fever of 100.4 F (38 C) or higher, or as directed by your healthcare provider  Date Last Reviewed: 7/26/2015 2000-2017 [employer]. [address] [place] [address], [place] [address]. All rights reserved. This information is not intended as a substitute for professional medical care. Always follow your healthcare professional's instructions.

## 2018-01-03 NOTE — ED AVS SNAPSHOT
[facility] Emergency Department    [address]    [facility] [address]    Phone:  [phone]    Fax:  [phone]                                       Goldie aPul   MRN: [id number]    Department:  [facility] Emergency Department   Date of Visit:  1/3/2018           Patient Information     Date Of Birth          1960        Your diagnoses for this visit were:     Scleritis and episcleritis of right eye     Acute right eye pain        You were seen by Fernando Carbajal MD.      Follow-up Information     Follow up with Jamari Torres. Schedule an appointment as soon as possible for a visit in 1 day.    Why:  For re-check from today's ED visit    Contact information:    [facility]  [address] [facility] [address]  [phone]          Follow up with [facility]. Schedule an appointment as soon as possible for a visit in 1 day.    Contact information:    [address]  [facility] [address] [phone]        Follow up with [facility] Emergency Department.    Specialty:  EMERGENCY MEDICINE    Why:  If symptoms worsen    Contact information:    [address]  [facility] [id number] [phone]        Discharge Instructions         Acute Pain, Uncertain Cause  Pain can be caused by many conditions that range from very minor to very serious. In some cases, though, pain comes and goes with no apparent cause.  We were not able to find the exact cause for your pain. At this time there is no sign of any serious illness causing your pain. More tests may be needed to determine the cause. In many cases, pain like this goes away by itself.  Home care  Take any medicines as prescribed. If another medicine was not prescribed for pain, you can take an over-the-counter pain medicine such as ibuprofen or acetaminophen. Use these as directed on the label.    Follow-up care  Follow up with your healthcare  provider or our staff as directed.  When to seek medical advice  Call your healthcare provider for any of the following:    Pain changes in pattern    Pain doesn't lessen or gets worse    New symptoms appear    Fever of 100.4 F (38 C) or higher, or as directed by your healthcare provider  Date Last Reviewed: 7/26/2015 2000-2017 [employer]. [address] [place] [address], [place] [address]. All rights reserved. This information is not intended as a substitute for professional medical care. Always follow your healthcare professional's instructions.          24 Hour Appointment Hotline       To make an appointment at any [facility], call [phone] ([phone]). If you don't have a family doctor or clinic, we will help you find one. [facility] clinics are conveniently located to serve the needs of you and your family.             Review of your medicines      START taking        Dose / Directions Last dose taken    SEBASTIAN REWETTING DROPS Soln   Dose:  2 drop   Quantity:  1 Bottle        2 drops every 2 hours as needed 2 drops to right eye every 2 hours as needed   Refills:  1          Our records show that you are taking the medicines listed below. If these are incorrect, please call your family doctor or clinic.        Dose / Directions Last dose taken    cyclobenzaprine 5 MG tablet   Commonly known as:  FLEXERIL   Quantity:  180 tablet        1-2 tabs at bedtime as needed fibromyalgia muscle pain   Refills:  1        estradiol 0.05 MG/24HR BIW patch   Commonly known as:  VIVELLE-DOT   Quantity:  24 patch        APPLY ONE PATCH EXTERNALLY to skin TWICE WEEKLY   Refills:  3        melatonin 3 MG tablet   Dose:  3 mg   Quantity:  100 tablet        Take 1 tablet (3 mg) by mouth nightly as needed for sleep   Refills:  3        * order for DME   Quantity:  1 Device        TENS unit. Use daily as needed for musculoskeletal pain   Refills:  0        * order for DME   Quantity:  1 Package        Orthotics  for both feet   Refills:  2        pantoprazole 40 MG EC tablet   Commonly known as:  PROTONIX   Quantity:  30 tablet        TAKE ONE TABLET BY MOUTH ONE TIME DAILY   Refills:  2        pregabalin 75 MG capsule   Commonly known as:  LYRICA   Dose:  75 mg   Quantity:  60 capsule        Take 1 capsule (75 mg) by mouth 2 times daily   Refills:  5        ranitidine 150 MG tablet   Commonly known as:  ZANTAC   Quantity:  60 tablet        TAKE ONE TABLET BY MOUTH TWICE DAILY   Refills:  11        traMADol 50 MG tablet   Commonly known as:  ULTRAM   Quantity:  120 tablet        TAKE ONE TO TWO TABLETS TWICE DAILY AS NEEDED FOR PAIN   Refills:  4        * Notice:  This list has 2 medication(s) that are the same as other medications prescribed for you. Read the directions carefully, and ask your doctor or other care provider to review them with you.            Prescriptions were sent or printed at these locations (1 Prescription)                   Other Prescriptions                Printed at Department/Unit printer (1 of 1)         Oxygen Permeable Lens Products (SEBASTIAN REWETTING DROPS) SOLN                Orders Needing Specimen Collection     None      Pending Results     No orders found from 1/1/2018 to 1/4/2018.            Pending Culture Results     No orders found from 1/1/2018 to 1/4/2018.            Pending Results Instructions     If you had any lab results that were not finalized at the time of your Discharge, you can call the ED Lab Result RN at [phone]. You will be contacted by this team for any positive Lab results or changes in treatment. The nurses are available 7 days a week from 10A to 6:30P.  You can leave a message 24 hours per day and they will return your call.        Test Results From Your Hospital Stay               Clinical Quality Measure: Blood Pressure Screening     Your blood pressure was checked while you were in the emergency department today. The last reading we obtained was  BP: (!) 154/99  . Please read the guidelines below about what these numbers mean and what you should do about them.  If your systolic blood pressure (the top number) is less than 120 and your diastolic blood pressure (the bottom number) is less than 80, then your blood pressure is normal. There is nothing more that you need to do about it.  If your systolic blood pressure (the top number) is 120-139 or your diastolic blood pressure (the bottom number) is 80-89, your blood pressure may be higher than it should be. You should have your blood pressure rechecked within a year by a primary care provider.  If your systolic blood pressure (the top number) is 140 or greater or your diastolic blood pressure (the bottom number) is 90 or greater, you may have high blood pressure. High blood pressure is treatable, but if left untreated over time it can put you at risk for heart attack, stroke, or kidney failure. You should have your blood pressure rechecked by a primary care provider within the next 4 weeks.  If your provider in the emergency department today gave you specific instructions to follow-up with your doctor or provider even sooner than that, you should follow that instruction and not wait for up to 4 weeks for your follow-up visit.        Thank you for choosing [facility]       Thank you for choosing [facility] for your care. Our goal is always to provide you with excellent care. Hearing back from our patients is one way we can continue to improve our services. Please take a few minutes to complete the written survey that you may receive in the mail after you visit with us. Thank you!        [facility] Information     [facility] gives you secure access to your electronic health record. If you see a primary care provider, you can also send messages to your care team and make appointments. If you have questions, please call your primary care clinic.  If you do not have a primary care provider, please call [phone] and they will assist  you.        Care EveryWhere ID     This is your Care EveryWhere ID. This could be used by other organizations to access your [facility] medical records  [id number]        Equal Access to Services     KAILA LOMBARDO : Yanira Schultz, luana nazario, brianne fernandes, nicki robert. So [facility] [phone].    ATENCIÓN: Si habla español, tiene a cloud disposición servicios gratuitos de asistencia lingüística. Llame al [phone].    We comply with applicable federal civil rights laws and [place] laws. We do not discriminate on the basis of race, color, national origin, age, disability, sex, sexual orientation, or gender identity.            After Visit Summary       This is your record. Keep this with you and show to your community pharmacist(s) and doctor(s) at your next visit.

## 2018-01-07 ENCOUNTER — OFFICE VISIT (OUTPATIENT)
Dept: URGENT CARE | Facility: URGENT CARE | Age: 58
End: 2018-01-07
Payer: COMMERCIAL

## 2018-01-07 VITALS
SYSTOLIC BLOOD PRESSURE: 100 MMHG | HEART RATE: 91 BPM | OXYGEN SATURATION: 96 % | DIASTOLIC BLOOD PRESSURE: 73 MMHG | TEMPERATURE: 98.4 F

## 2018-01-07 DIAGNOSIS — R50.9 FEVER IN ADULT: ICD-10-CM

## 2018-01-07 DIAGNOSIS — R05.9 COUGH: ICD-10-CM

## 2018-01-07 DIAGNOSIS — M79.10 MYALGIA: ICD-10-CM

## 2018-01-07 DIAGNOSIS — J11.1 INFLUENZA-LIKE ILLNESS: Primary | ICD-10-CM

## 2018-01-07 DIAGNOSIS — R07.0 THROAT PAIN: ICD-10-CM

## 2018-01-07 LAB
DEPRECATED S PYO AG THROAT QL EIA: NORMAL
FLUAV+FLUBV AG SPEC QL: NEGATIVE
FLUAV+FLUBV AG SPEC QL: NEGATIVE
SPECIMEN SOURCE: NORMAL
SPECIMEN SOURCE: NORMAL

## 2018-01-07 PROCEDURE — 87081 CULTURE SCREEN ONLY: CPT | Performed by: FAMILY MEDICINE

## 2018-01-07 PROCEDURE — 99213 OFFICE O/P EST LOW 20 MIN: CPT | Performed by: FAMILY MEDICINE

## 2018-01-07 PROCEDURE — 87804 INFLUENZA ASSAY W/OPTIC: CPT | Performed by: FAMILY MEDICINE

## 2018-01-07 PROCEDURE — 87880 STREP A ASSAY W/OPTIC: CPT | Performed by: FAMILY MEDICINE

## 2018-01-07 RX ORDER — OSELTAMIVIR PHOSPHATE 75 MG/1
75 CAPSULE ORAL 2 TIMES DAILY
Qty: 10 CAPSULE | Refills: 0 | Status: SHIPPED | OUTPATIENT
Start: 2018-01-07 | End: 2018-01-12

## 2018-01-07 RX ORDER — BENZONATATE 100 MG/1
200 CAPSULE ORAL 3 TIMES DAILY PRN
Qty: 42 CAPSULE | Refills: 3 | Status: SHIPPED | OUTPATIENT
Start: 2018-01-07 | End: 2018-06-05

## 2018-01-07 RX ORDER — CODEINE PHOSPHATE AND GUAIFENESIN 10; 100 MG/5ML; MG/5ML
1-2 SOLUTION ORAL EVERY 6 HOURS PRN
Qty: 236 ML | Refills: 0 | Status: SHIPPED | OUTPATIENT
Start: 2018-01-07 | End: 2018-06-05

## 2018-01-07 NOTE — PATIENT INSTRUCTIONS
Get plenty of rest    Drink a lot of water    Tylenol, Ibuprofen for fevers, aches    follow up with your primary care provider if not better in 8-10 days.

## 2018-01-07 NOTE — PROGRESS NOTES
SUBJECTIVE:   Goldie Paul is a 57 year old female presenting with a chief complaint of sore throat (moderate), fever (feels very warm), chills, stuffy nose, dry cough, muscle aches, headaches at the forehead, stiff neck, neck pain (bilateral posterior neck), right ear pain.  Onset of symptoms was one day ago.  Course of illness is worsening.    Severity severe.  Current and Associated symptoms: as listed above.   Treatment measures tried include Advil (last taken at 8:30 am today).  Predisposing factors include her  has had a cough recently.  .    Patient is leaving for vacation tomorrow and she needs to get better quickly.      Past Medical History:   Diagnosis Date     Allergic rhinitis, cause unspecified      Disorders of bursae and tendons in shoulder region, unspecified 1/06    right     Endometriosis of other specified sites      Esophageal reflux      FIBROMYALGIA     Neg Rheum work-up     Hyperlipidemia      Impingement syndrome of left shoulder     s/p cortisone injection     Insomnia 12/23/2011     Lateral epicondylitis  of elbow 2/06    left     Plantar fascial fibromatosis      Polyneuropathy in other diseases classified elsewhere (H)      Radial styloid tenosynovitis     bilateral     Sprain of hand, unspecified site 1/06    right thumb MCP joint     Uncomplicated asthma      Current Outpatient Prescriptions   Medication Sig Dispense Refill     Oxygen Permeable Lens Products (SEBASTIAN REWETTING DROPS) SOLN 2 drops every 2 hours as needed 2 drops to right eye every 2 hours as needed 1 Bottle 1     pregabalin (LYRICA) 75 MG capsule Take 1 capsule (75 mg) by mouth 2 times daily 60 capsule 5     estradiol (VIVELLE-DOT) 0.05 MG/24HR BIW patch APPLY ONE PATCH EXTERNALLY to skin TWICE WEEKLY 24 patch 3     pantoprazole (PROTONIX) 40 MG EC tablet TAKE ONE TABLET BY MOUTH ONE TIME DAILY  30 tablet 2     order for DME Orthotics for both feet 1 Package 2     order for DME TENS unit. Use daily as needed for  musculoskeletal pain 1 Device 0     ranitidine (ZANTAC) 150 MG tablet TAKE ONE TABLET BY MOUTH TWICE DAILY  60 tablet 11     cyclobenzaprine (FLEXERIL) 5 MG tablet 1-2 tabs at bedtime as needed fibromyalgia muscle pain 180 tablet 1     traMADol (ULTRAM) 50 MG tablet TAKE ONE TO TWO TABLETS TWICE DAILY AS NEEDED FOR PAIN 120 tablet 4     melatonin 3 MG tablet Take 1 tablet (3 mg) by mouth nightly as needed for sleep 100 tablet 3     Social History   Substance Use Topics     Smoking status: Never Smoker     Smokeless tobacco: Never Used     Alcohol use No       ROS:  Review of systems negative except as stated above.    OBJECTIVE:  /73 (BP Location: Right arm, Patient Position: Chair, Cuff Size: Adult Regular)  Pulse 91  Temp 98.4  F (36.9  C) (Oral)  SpO2 96%  GENERAL APPEARANCE: healthy, alert and no acute respiratory distress.  Patient appears very fatigued.  Voice is mildly hoarse.   HENT: TM's normal bilaterally, nasal turbinates erythematous, swollen and oropharynx is very erythematous without exudates.   NECK: supple, with some tenderness at the angles of the mandible, no lymphadenopathy  RESP: lungs clear to auscultation - no rales, rhonchi or wheezes  CV: regular rates and rhythm, normal S1 S2, no murmur noted  SKIN: no suspicious lesions or rashes    LAB:    Results for orders placed or performed in visit on 01/07/18   Rapid strep screen   Result Value Ref Range    Specimen Description Throat     Rapid Strep A Screen       NEGATIVE: No Group A streptococcal antigen detected by immunoassay, await culture report.   Influenza A/B antigen   Result Value Ref Range    Influenza A/B Agn Specimen Nasal     Influenza A Negative NEG^Negative    Influenza B Negative NEG^Negative           ASSESSMENT:  Fever  Myalgia  Throat Pain  Cough  Influenza-Like Illness    PLAN:  Rx:  Tessalon Perles, Cheratussin AC, Tamiflu  Drink plenty of water  Get plenty of rest    Ibuprofen, Tylenol  Throat culture pending.   See  orders in Epic  follow up with the primary care provider if not better in 8-10 days.       Osiel Mendez MD

## 2018-01-07 NOTE — MR AVS SNAPSHOT
After Visit Summary   1/7/2018    Goldie Paul    MRN: [id number]           Patient Information     Date Of Birth          1960        Visit Information        Provider Department      1/7/2018 10:15 AM Osiel Mendez MD [facility]        Today's Diagnoses     Influenza-like illness    -  1    Throat pain        Fever in adult        Myalgia        Cough          Care Instructions    Get plenty of rest    Drink a lot of water    Tylenol, Ibuprofen for fevers, aches    follow up with your primary care provider if not better in 8-10 days.                 Follow-ups after your visit        Who to contact     If you have questions or need follow up information about today's clinic visit or your schedule please contact [facility] directly at [phone].  Normal or non-critical lab and imaging results will be communicated to you by [employer], letter or phone within 4 business days after the clinic has received the results. If you do not hear from us within 7 days, please contact the clinic through [employer] or phone. If you have a critical or abnormal lab result, we will notify you by phone as soon as possible.  Submit refill requests through [employer] or call your pharmacy and they will forward the refill request to us. Please allow 3 business days for your refill to be completed.          Additional Information About Your Visit        MyChart Information     [employer] gives you secure access to your electronic health record. If you see a primary care provider, you can also send messages to your care team and make appointments. If you have questions, please call your primary care clinic.  If you do not have a primary care provider, please call [phone] and they will assist you.        Care EveryWhere ID     This is your Care EveryWhere ID. This could be used by other organizations to access your [place] medical records  [id number]        Your Vitals Were     Pulse  Temperature Pulse Oximetry             91 98.4  F (36.9  C) (Oral) 96%          Blood Pressure from Last 3 Encounters:   01/07/18 100/73   01/03/18 (!) 154/99   12/22/17 110/76    Weight from Last 3 Encounters:   12/22/17 129 lb (58.5 kg)   11/16/17 126 lb 8 oz (57.4 kg)   09/05/17 129 lb (58.5 kg)              We Performed the Following     Beta strep group A culture     Influenza A/B antigen     Rapid strep screen          Today's Medication Changes          These changes are accurate as of: 1/7/18 11:23 AM.  If you have any questions, ask your nurse or doctor.               Start taking these medicines.        Dose/Directions    benzonatate 100 MG capsule   Commonly known as:  TESSALON   Used for:  Cough        Dose:  200 mg   Take 2 capsules (200 mg) by mouth 3 times daily as needed   Quantity:  42 capsule   Refills:  3       guaiFENesin-codeine 100-10 MG/5ML Soln solution   Commonly known as:  ROBITUSSIN AC   Used for:  Cough        Dose:  1-2 tsp.   Take 5-10 mLs by mouth every 6 hours as needed   Quantity:  236 mL   Refills:  0       oseltamivir 75 MG capsule   Commonly known as:  TAMIFLU   Used for:  Influenza-like illness        Dose:  75 mg   Take 1 capsule (75 mg) by mouth 2 times daily for 5 days   Quantity:  10 capsule   Refills:  0            Where to get your medicines      These medications were sent to [facility] [address] [place] [address]     Phone:  [phone]     benzonatate 100 MG capsule    oseltamivir 75 MG capsule         Some of these will need a paper prescription and others can be bought over the counter.  Ask your nurse if you have questions.     Bring a paper prescription for each of these medications     guaiFENesin-codeine 100-10 MG/5ML Soln solution                Primary Care Provider Office Phone # Fax #    Misbah Negrete [phone] [address] [place] [address]        Equal Access to Services     [facility]  GAAR : Hadii aad ku hadkristino Sojazminali, waaxda luqadaha, qaybta kaalmada ademerlene, nicki babakin hayaan adelc kaba charli . So [facility] [phone].    ATENCIÓN: Si habla español, tiene a cloud disposición servicios gratuitos de asistencia lingüística. Llame al [phone].    We comply with applicable federal civil rights laws and [place] laws. We do not discriminate on the basis of race, color, national origin, age, disability, sex, sexual orientation, or gender identity.            Thank you!     Thank you for choosing [facility]  for your care. Our goal is always to provide you with excellent care. Hearing back from our patients is one way we can continue to improve our services. Please take a few minutes to complete the written survey that you may receive in the mail after your visit with us. Thank you!             Your Updated Medication List - Protect others around you: Learn how to safely use, store and throw away your medicines at www.disposemymeds.org.          This list is accurate as of: 1/7/18 11:23 AM.  Always use your most recent med list.                   Brand Name Dispense Instructions for use Diagnosis    benzonatate 100 MG capsule    TESSALON    42 capsule    Take 2 capsules (200 mg) by mouth 3 times daily as needed    Cough       cyclobenzaprine 5 MG tablet    FLEXERIL    180 tablet    1-2 tabs at bedtime as needed fibromyalgia muscle pain    Fibromyalgia       estradiol 0.05 MG/24HR BIW patch    VIVELLE-DOT    24 patch    APPLY ONE PATCH EXTERNALLY to skin TWICE WEEKLY    Symptomatic menopausal or female climacteric states       guaiFENesin-codeine 100-10 MG/5ML Soln solution    ROBITUSSIN AC    236 mL    Take 5-10 mLs by mouth every 6 hours as needed    Cough       melatonin 3 MG tablet     100 tablet    Take 1 tablet (3 mg) by mouth nightly as needed for sleep    Insomnia, unspecified type       * order for DME     1 Device    TENS unit. Use daily as needed for  musculoskeletal pain    Fibromyalgia, Chronic pain syndrome       * order for DME     1 Package    Orthotics for both feet    Plantar fasciitis       oseltamivir 75 MG capsule    TAMIFLU    10 capsule    Take 1 capsule (75 mg) by mouth 2 times daily for 5 days    Influenza-like illness       pantoprazole 40 MG EC tablet    PROTONIX    30 tablet    TAKE ONE TABLET BY MOUTH ONE TIME DAILY    Gastroesophageal reflux disease without esophagitis       pregabalin 75 MG capsule    LYRICA    60 capsule    Take 1 capsule (75 mg) by mouth 2 times daily    Fibromyalgia       ranitidine 150 MG tablet    ZANTAC    60 tablet    TAKE ONE TABLET BY MOUTH TWICE DAILY    Gastroesophageal reflux disease without esophagitis       SEBASTIAN REWETTING DROPS Soln     1 Bottle    2 drops every 2 hours as needed 2 drops to right eye every 2 hours as needed        traMADol 50 MG tablet    ULTRAM    120 tablet    TAKE ONE TO TWO TABLETS TWICE DAILY AS NEEDED FOR PAIN    Fibromyalgia       * Notice:  This list has 2 medication(s) that are the same as other medications prescribed for you. Read the directions carefully, and ask your doctor or other care provider to review them with you.

## 2018-01-07 NOTE — NURSING NOTE
"Chief Complaint   Patient presents with     Urgent Care     Pharyngitis     Sore throat x2 days- fever, myalgias, stiff neck, neck pain, R ear pain       Initial /73 (BP Location: Right arm, Patient Position: Chair, Cuff Size: Adult Regular)  Pulse 91  Temp 98.4  F (36.9  C) (Oral)  SpO2 96% Estimated body mass index is 22.85 kg/(m^2) as calculated from the following:    Height as of 11/16/17: 5' 3\" (1.6 m).    Weight as of 12/22/17: 129 lb (58.5 kg).  Medication Reconciliation: complete     Evelia Tadeo CMA (AAMA)        "

## 2018-01-08 LAB
BACTERIA SPEC CULT: NORMAL
SPECIMEN SOURCE: NORMAL

## 2018-03-21 PROBLEM — M54.2 CERVICALGIA: Status: RESOLVED | Noted: 2017-11-15 | Resolved: 2018-03-21

## 2018-03-21 PROBLEM — G89.29 CHRONIC BILATERAL LOW BACK PAIN WITHOUT SCIATICA: Status: RESOLVED | Noted: 2017-11-15 | Resolved: 2018-03-21

## 2018-03-21 PROBLEM — M54.50 CHRONIC BILATERAL LOW BACK PAIN WITHOUT SCIATICA: Status: RESOLVED | Noted: 2017-11-15 | Resolved: 2018-03-21

## 2018-03-21 NOTE — PROGRESS NOTES
Discharge Note    Progress reporting period is from initial eval to Nov 29, 2017.     Goldie failed to return for next follow up visit and current status is unknown.  Please see information below for last relevant information on current status.  Patient seen for 3 visits.  SUBJECTIVE  Subjective changes noted by patient:  Neck is slowly feeling better.  Back has been feeling better.  Back is more achy.    .  Current pain level is  .     Previous pain level was  8/10.   Changes in function:  Yes (See Goal flowsheet attached for changes in current functional level)  Adverse reaction to treatment or activity: None    OBJECTIVE  Changes noted in objective findings: Cervical rotation min/mod restriction with pain in right neck.  Cervical retraction increased.       ASSESSMENT/PLAN  Diagnosis: low back and neck pain   DIAGP:  Diagnoses of Chronic bilateral low back pain without sciatica and Cervicalgia were pertinent to this visit.  STG/LTGs have been met or progress has been made towards goals:  Yes, please see goal flowsheet for most current information  Assessment of Progress: current status is unknown.    Last current status: Pt is progressing well   Self Management Plans:  HEP  I have re-evaluated this patient and find that the nature, scope, duration and intensity of the therapy is appropriate for the medical condition of the patient.  Goldie continues to require the following intervention to meet STG and LTG's:  HEP.    Recommendations:  Discharge with current home program.  Patient to follow up with MD as needed.    Please refer to the daily flowsheet for treatment today, total treatment time and time spent performing 1:1 timed codes.

## 2018-04-12 ENCOUNTER — TRANSFERRED RECORDS (OUTPATIENT)
Dept: HEALTH INFORMATION MANAGEMENT | Facility: CLINIC | Age: 58
End: 2018-04-12

## 2018-05-02 ENCOUNTER — TRANSFERRED RECORDS (OUTPATIENT)
Dept: HEALTH INFORMATION MANAGEMENT | Facility: CLINIC | Age: 58
End: 2018-05-02

## 2018-06-05 ENCOUNTER — OFFICE VISIT (OUTPATIENT)
Dept: INTERNAL MEDICINE | Facility: CLINIC | Age: 58
End: 2018-06-05
Payer: COMMERCIAL

## 2018-06-05 VITALS
SYSTOLIC BLOOD PRESSURE: 110 MMHG | BODY MASS INDEX: 22.68 KG/M2 | HEART RATE: 92 BPM | RESPIRATION RATE: 16 BRPM | WEIGHT: 128 LBS | HEIGHT: 63 IN | DIASTOLIC BLOOD PRESSURE: 60 MMHG | TEMPERATURE: 97.4 F

## 2018-06-05 DIAGNOSIS — R53.83 OTHER FATIGUE: Primary | ICD-10-CM

## 2018-06-05 DIAGNOSIS — G62.9 NEUROPATHY: ICD-10-CM

## 2018-06-05 DIAGNOSIS — M79.7 FIBROMYALGIA: ICD-10-CM

## 2018-06-05 LAB
ALBUMIN SERPL-MCNC: 3.7 G/DL (ref 3.4–5)
ALP SERPL-CCNC: 108 U/L (ref 40–150)
ALT SERPL W P-5'-P-CCNC: 56 U/L (ref 0–50)
ANION GAP SERPL CALCULATED.3IONS-SCNC: 7 MMOL/L (ref 3–14)
AST SERPL W P-5'-P-CCNC: 34 U/L (ref 0–45)
BILIRUB SERPL-MCNC: 0.2 MG/DL (ref 0.2–1.3)
BUN SERPL-MCNC: 11 MG/DL (ref 7–30)
CALCIUM SERPL-MCNC: 9.1 MG/DL (ref 8.5–10.1)
CHLORIDE SERPL-SCNC: 107 MMOL/L (ref 94–109)
CO2 SERPL-SCNC: 27 MMOL/L (ref 20–32)
CREAT SERPL-MCNC: 0.56 MG/DL (ref 0.52–1.04)
CRP SERPL-MCNC: <2.9 MG/L (ref 0–8)
ERYTHROCYTE [DISTWIDTH] IN BLOOD BY AUTOMATED COUNT: 13.2 % (ref 10–15)
GFR SERPL CREATININE-BSD FRML MDRD: >90 ML/MIN/1.7M2
GLUCOSE SERPL-MCNC: 98 MG/DL (ref 70–99)
HCT VFR BLD AUTO: 41.9 % (ref 35–47)
HGB BLD-MCNC: 13.9 G/DL (ref 11.7–15.7)
MCH RBC QN AUTO: 29.6 PG (ref 26.5–33)
MCHC RBC AUTO-ENTMCNC: 33.2 G/DL (ref 31.5–36.5)
MCV RBC AUTO: 89 FL (ref 78–100)
PLATELET # BLD AUTO: 207 10E9/L (ref 150–450)
POTASSIUM SERPL-SCNC: 3.9 MMOL/L (ref 3.4–5.3)
PROT SERPL-MCNC: 7.5 G/DL (ref 6.8–8.8)
RBC # BLD AUTO: 4.69 10E12/L (ref 3.8–5.2)
SODIUM SERPL-SCNC: 141 MMOL/L (ref 133–144)
TSH SERPL DL<=0.005 MIU/L-ACNC: 1.14 MU/L (ref 0.4–4)
WBC # BLD AUTO: 6.6 10E9/L (ref 4–11)

## 2018-06-05 PROCEDURE — 80053 COMPREHEN METABOLIC PANEL: CPT | Performed by: INTERNAL MEDICINE

## 2018-06-05 PROCEDURE — 86140 C-REACTIVE PROTEIN: CPT | Performed by: INTERNAL MEDICINE

## 2018-06-05 PROCEDURE — 84443 ASSAY THYROID STIM HORMONE: CPT | Performed by: INTERNAL MEDICINE

## 2018-06-05 PROCEDURE — 99214 OFFICE O/P EST MOD 30 MIN: CPT | Performed by: INTERNAL MEDICINE

## 2018-06-05 PROCEDURE — 85027 COMPLETE CBC AUTOMATED: CPT | Performed by: INTERNAL MEDICINE

## 2018-06-05 PROCEDURE — 36415 COLL VENOUS BLD VENIPUNCTURE: CPT | Performed by: INTERNAL MEDICINE

## 2018-06-05 RX ORDER — PREGABALIN 75 MG/1
75 CAPSULE ORAL 3 TIMES DAILY
Qty: 90 CAPSULE | Refills: 5 | Status: SHIPPED | OUTPATIENT
Start: 2018-06-05 | End: 2018-12-14

## 2018-06-05 ASSESSMENT — PAIN SCALES - GENERAL: PAINLEVEL: SEVERE PAIN (6)

## 2018-06-05 NOTE — PATIENT INSTRUCTIONS
EMG BLE at [facility]. They will call to schedule  Labs as ordered  Increase Lyrica to 75mg three times a day for fibromyalgia pain   Referral to  Sleep clinic for possible obstructive sleep apnea evaluation.Call for appt

## 2018-06-05 NOTE — MR AVS SNAPSHOT
After Visit Summary   6/5/2018    Goldie Paul    MRN: [id number]           Patient Information     Date Of Birth          1960        Visit Information        Provider Department      6/5/2018 2:30 PM Misbah Negrete MD [facility]        Today's Diagnoses     Other fatigue    -  1    Fibromyalgia        Neuropathy          Care Instructions    EMG BLE at [facility]. They will call to schedule  Labs as ordered  Increase Lyrica to 75mg three times a day for fibromyalgia pain   Referral to  Sleep clinic for possible obstructive sleep apnea evaluation.Call for appt          Follow-ups after your visit        Additional Services     SLEEP EVALUATION & MANAGEMENT REFERRAL - [facility]  [phone] (Age 18 and up)       Please be aware that coverage of these services is subject to the terms and limitations of your health insurance plan.  Call member services at your health plan with any benefit or coverage questions.      Please bring the following to your appointment:    >>   List of current medications   >>   This referral request   >>   Any documents/labs given to you for this referral                      Future tests that were ordered for you today     Open Future Orders        Priority Expected Expires Ordered    SLEEP EVALUATION & MANAGEMENT REFERRAL - [facility]  [phone] (Age 18 and up) Routine  6/5/2019 6/5/2018            Who to contact     If you have questions or need follow up information about today's clinic visit or your schedule please contact [facility] directly at [phone].  Normal or non-critical lab and imaging results will be communicated to you by MyChart, letter or phone within 4 business days after the clinic has received the results. If you do not hear from us within 7 days, please contact the clinic through MyChart or phone. If you have  "a critical or abnormal lab result, we will notify you by phone as soon as possible.  Submit refill requests through [employer] or call your pharmacy and they will forward the refill request to us. Please allow 3 business days for your refill to be completed.          Additional Information About Your Visit        [employer] Information     [employer] gives you secure access to your electronic health record. If you see a primary care provider, you can also send messages to your care team and make appointments. If you have questions, please call your primary care clinic.  If you do not have a primary care provider, please call [phone] and they will assist you.        Care EveryWhere ID     This is your Care EveryWhere ID. This could be used by other organizations to access your [place] medical records  [id number]        Your Vitals Were     Pulse Temperature Respirations Height BMI (Body Mass Index)       92 97.4  F (36.3  C) (Oral) 16 5' 3\" (1.6 m) 22.67 kg/m2        Blood Pressure from Last 3 Encounters:   06/05/18 110/60   01/07/18 100/73   01/03/18 (!) 154/99    Weight from Last 3 Encounters:   06/05/18 128 lb (58.1 kg)   12/22/17 129 lb (58.5 kg)   11/16/17 126 lb 8 oz (57.4 kg)                 Today's Medication Changes          These changes are accurate as of 6/5/18  3:13 PM.  If you have any questions, ask your nurse or doctor.               These medicines have changed or have updated prescriptions.        Dose/Directions    pregabalin 75 MG capsule   Commonly known as:  LYRICA   This may have changed:  when to take this   Used for:  Fibromyalgia   Changed by:  Misbah Negrete MD        Dose:  75 mg   Take 1 capsule (75 mg) by mouth 3 times daily   Quantity:  90 capsule   Refills:  5            Where to get your medicines      Some of these will need a paper prescription and others can be bought over the counter.  Ask your nurse if you have questions.     Bring a paper prescription for each of these medications "     pregabalin 75 MG capsule                Primary Care Provider Office Phone # Fax #    Misbah Negrete [phone]       [address] [facility] [address]        Equal Access to Services     KAILA LOMBARDO : Yanira bob debby Sojazminali, waaxda luqadaha, qaybta kaalmada adelcyada, nicki kaba laXinrd robert. So [facility] [phone].    ATENCIÓN: Si habla español, tiene a cloud disposición servicios gratuitos de asistencia lingüística. Llame al [phone].    We comply with applicable federal civil rights laws and [place] laws. We do not discriminate on the basis of race, color, national origin, age, disability, sex, sexual orientation, or gender identity.            Thank you!     Thank you for choosing [facility]  for your care. Our goal is always to provide you with excellent care. Hearing back from our patients is one way we can continue to improve our services. Please take a few minutes to complete the written survey that you may receive in the mail after your visit with us. Thank you!             Your Updated Medication List - Protect others around you: Learn how to safely use, store and throw away your medicines at www.disposemymeds.org.          This list is accurate as of 6/5/18  3:13 PM.  Always use your most recent med list.                   Brand Name Dispense Instructions for use Diagnosis    estradiol 0.05 MG/24HR BIW patch    VIVELLE-DOT    24 patch    APPLY ONE PATCH EXTERNALLY to skin TWICE WEEKLY    Symptomatic menopausal or female climacteric states       melatonin 3 MG tablet     100 tablet    Take 1 tablet (3 mg) by mouth nightly as needed for sleep    Insomnia, unspecified type       order for DME     1 Package    Orthotics for both feet    Plantar fasciitis       pantoprazole 40 MG EC tablet    PROTONIX    30 tablet    TAKE ONE TABLET BY MOUTH ONE TIME DAILY    Gastroesophageal reflux disease without esophagitis       pregabalin 75 MG  capsule    LYRICA    90 capsule    Take 1 capsule (75 mg) by mouth 3 times daily    Fibromyalgia       ranitidine 150 MG tablet    ZANTAC    60 tablet    TAKE ONE TABLET BY MOUTH TWICE DAILY    Gastroesophageal reflux disease without esophagitis       traMADol 50 MG tablet    ULTRAM    120 tablet    TAKE ONE TO TWO TABLETS TWICE DAILY AS NEEDED FOR PAIN    Fibromyalgia

## 2018-06-05 NOTE — PROGRESS NOTES
"  SUBJECTIVE:   Goldie Paul is a 57 year old female who presents to clinic today for the following health issues:    Chief Complaint   Patient presents with     Follow Up For     Fibromyalgia     Pt's past medical history, family history, habits, medications and allergies were reviewed with the patient today.  See snap shot for  HCM status. Most recent lab results reviewed with pt. Problem list and histories reviewed & adjusted, as indicated.  Additional history as below:    Snoring per . Some trouble falling asleep occ.  Occ wakes up middle of night and then can't  Get back to sleep. Mind will be busy but no particular worries  Slight increase in hairloss  Weight up 4 pounds in 2 years  Minimal nasal congestion   No F/C.   HAs some neuropathy in feet. Not in calves. Worse with standing longtime at work  History of chronic fibromyalgia.  Patient's pain symptoms seem worse recently.  No longer using cyclobenzaprine and very rare use of tramadol.  Patient currently on Lyrica twice a day which has improved pain control  compared to prior to taking.  Denies side effects with this medication     Additional ROS:   Constitutional, HEENT, Cardiovascular, Pulmonary, GI and , Neuro, MSK and Psych review of systems/symptoms are otherwise negative or unchanged from previous, except as noted above.      OBJECTIVE:  /60  Pulse 92  Temp 97.4  F (36.3  C) (Oral)  Resp 16  Ht 5' 3\" (1.6 m)  Wt 128 lb (58.1 kg)  BMI 22.67 kg/m2   Estimated body mass index is 22.67 kg/(m^2) as calculated from the following:    Height as of this encounter: 5' 3\" (1.6 m).    Weight as of this encounter: 128 lb (58.1 kg).  Neck: no adenopathy. Thyroid normal to palpation. No bruits  Pulm: Lungs clear to auscultation   CV: Regular rates and rhythm  GI: Soft, nontender, Normal active bowel sounds, No hepatosplenomegaly or masses palpable  Ext: Peripheral pulses intact. No edema.  Neuro: Normal strength and tone, sensory exam " grossly normal to light touch sensation distal bilateral lower extremities despite subjective symptoms fibro-myalgia trigger points.  Globally tender to palpation  MSK: negative straight leg raise test bilaterally. Tenderness bilateral paralumbar musculature to palpation though this would be consistent with patient's known fibromyalgia also    Assessment/Plan: (See plan discussion below for further details)  1. Other fatigue  High risk for sleep apnea based on symptoms described above.  Will refer patient for sleep study.  Labs in addition  - SLEEP EVALUATION & MANAGEMENT REFERRAL - ADULT -[facility]  [phone] (Age 18 and up); Future  - Comprehensive metabolic panel  - CBC with platelets  - TSH with free T4 reflex    2. Fibromyalgia  Chronic.  Pain may be exacerbated by the presence of possible sleep apnea.  Sleep evaluation as above.  We will also increase Lyrica to TID dosing   - pregabalin (LYRICA) 75 MG capsule; Take 1 capsule (75 mg) by mouth 3 times daily  Dispense: 90 capsule; Refill: 5    3. Neuropathy  Symptoms mostly subjective.  Not having obvious radicular symptoms.  Not present in the hands.  Previous B12 levels normal.  Labs as ordered.  Will refer for bilateral lower extremity EMG in addition  - CBC with platelets  - CRP inflammation  - TSH with free T4 reflex   - NEUROLOGY ADULT REFERRAL    Plan discussion:  EMG BLE at [facility]. They will call to schedule  Labs as ordered  Increase Lyrica to 75mg three times a day for fibromyalgia pain   Referral to  Sleep clinic for possible obstructive sleep apnea evaluation.Call for appt       Misbah Negrete MD  Internal Medicine Department  [facility]

## 2018-06-13 DIAGNOSIS — M79.7 FIBROMYALGIA: ICD-10-CM

## 2018-06-13 RX ORDER — PREGABALIN 75 MG
CAPSULE ORAL
Qty: 60 CAPSULE | Refills: 4 | OUTPATIENT
Start: 2018-06-13

## 2018-07-03 ENCOUNTER — TRANSFERRED RECORDS (OUTPATIENT)
Dept: HEALTH INFORMATION MANAGEMENT | Facility: CLINIC | Age: 58
End: 2018-07-03

## 2018-07-24 ENCOUNTER — TRANSFERRED RECORDS (OUTPATIENT)
Dept: HEALTH INFORMATION MANAGEMENT | Facility: CLINIC | Age: 58
End: 2018-07-24

## 2018-08-22 ENCOUNTER — MYC REFILL (OUTPATIENT)
Dept: PEDIATRICS | Facility: CLINIC | Age: 58
End: 2018-08-22

## 2018-08-22 ENCOUNTER — MYC REFILL (OUTPATIENT)
Dept: INTERNAL MEDICINE | Facility: CLINIC | Age: 58
End: 2018-08-22

## 2018-08-22 DIAGNOSIS — K21.9 GASTROESOPHAGEAL REFLUX DISEASE WITHOUT ESOPHAGITIS: ICD-10-CM

## 2018-08-22 RX ORDER — PANTOPRAZOLE SODIUM 40 MG/1
40 TABLET, DELAYED RELEASE ORAL DAILY
Qty: 30 TABLET | Refills: 9 | Status: SHIPPED | OUTPATIENT
Start: 2018-08-22 | End: 2019-07-18

## 2018-08-22 NOTE — TELEPHONE ENCOUNTER
[facility]  Original authorizing provider: Misbah Negrete MD    Goldie Paul would like a refill of the following medications:  pantoprazole (PROTONIX) 40 MG EC tablet [Misbah Negrete MD]    Preferred pharmacy: [address]    Comment:      Medication renewals requested in this message routed to other providers:  ranitidine (ZANTAC) 150 MG tablet [Nelsy Mccallum MD]

## 2018-08-22 NOTE — TELEPHONE ENCOUNTER
Message from [facility]:  Original authorizing provider: Nelsy Mccallum MD    Goldie ALTMANLev Humberto would like a refill of the following medications:  ranitidine (ZANTAC) 150 MG tablet [Nelsy Mccallum MD]    Preferred pharmacy: [address]    Comment:      Medication renewals requested in this message routed to other providers:  pantoprazole (PROTONIX) 40 MG EC tablet [Misbah Negrete MD]

## 2018-08-22 NOTE — TELEPHONE ENCOUNTER
"Requested Prescriptions   Pending Prescriptions Disp Refills     pantoprazole (PROTONIX) 40 MG EC tablet 30 tablet 2     Sig: Take 1 tablet (40 mg) by mouth daily    PPI Protocol Passed    8/22/2018  8:58 AM       Passed - Not on Clopidogrel (unless Pantoprazole ordered)       Passed - No diagnosis of osteoporosis on record       Passed - Recent (12 mo) or future (30 days) visit within the authorizing provider's specialty    Patient had office visit in the last 12 months or has a visit in the next 30 days with authorizing provider or within the authorizing provider's specialty.  See \"Patient Info\" tab in inbasket, or \"Choose Columns\" in Meds & Orders section of the refill encounter.           Passed - Patient is age 18 or older       Passed - No active pregnacy on record       Passed - No positive pregnancy test in past 12 months        Last Written Prescription Date:  12/12/2017  Last Fill Quantity: 30,  # refills: 2   Last office visit: 6/5/2018 with prescribing provider:  6/05/2018   Future Office Visit:      "

## 2018-08-22 NOTE — TELEPHONE ENCOUNTER
"Ranitidine 150 mg  Last Written Prescription Date:  08/08/17  Last Fill Quantity: 60,  # refills: 11   Last office visit: 3/29/2017 with prescribing provider:  06/05/18   Future Office Visit:      Requested Prescriptions   Pending Prescriptions Disp Refills     ranitidine (ZANTAC) 150 MG tablet 60 tablet 11     Sig: Take 1 tablet (150 mg) by mouth 2 times daily    H2 Blockers Protocol Passed    8/22/2018 10:51 AM       Passed - Patient is age 12 or older       Passed - Recent (12 mo) or future (30 days) visit within the authorizing provider's specialty    Patient had office visit in the last 12 months or has a visit in the next 30 days with authorizing provider or within the authorizing provider's specialty.  See \"Patient Info\" tab in inbasket, or \"Choose Columns\" in Meds & Orders section of the refill encounter.          Prescription approved per [facility] Refill Protocol.  Patient is now seeing Dr. Maria Eugenia Durán RN    "

## 2018-08-23 ENCOUNTER — TRANSFERRED RECORDS (OUTPATIENT)
Dept: HEALTH INFORMATION MANAGEMENT | Facility: CLINIC | Age: 58
End: 2018-08-23

## 2018-08-31 ENCOUNTER — OFFICE VISIT (OUTPATIENT)
Dept: INTERNAL MEDICINE | Facility: CLINIC | Age: 58
End: 2018-08-31
Payer: COMMERCIAL

## 2018-08-31 VITALS
TEMPERATURE: 98.2 F | RESPIRATION RATE: 16 BRPM | BODY MASS INDEX: 22.51 KG/M2 | OXYGEN SATURATION: 100 % | WEIGHT: 127.1 LBS | DIASTOLIC BLOOD PRESSURE: 74 MMHG | HEART RATE: 99 BPM | SYSTOLIC BLOOD PRESSURE: 110 MMHG

## 2018-08-31 DIAGNOSIS — K29.70 GASTRITIS, PRESENCE OF BLEEDING UNSPECIFIED, UNSPECIFIED CHRONICITY, UNSPECIFIED GASTRITIS TYPE: Primary | ICD-10-CM

## 2018-08-31 PROCEDURE — 99214 OFFICE O/P EST MOD 30 MIN: CPT | Performed by: INTERNAL MEDICINE

## 2018-08-31 RX ORDER — SUCRALFATE ORAL 1 G/10ML
1 SUSPENSION ORAL 4 TIMES DAILY
Qty: 420 ML | Refills: 1 | Status: SHIPPED | OUTPATIENT
Start: 2018-08-31 | End: 2019-07-18

## 2018-08-31 NOTE — PROGRESS NOTES
SUBJECTIVE:   Goldie Paul is a 58 year old female who presents to clinic today for the following health issues:    Pt is a 58 year old female who is seen here to day with complains of epigastric pain for 2 wks  ,more so in past few days.  No regurgitation of acid and acid taste in the mouth,no belching or  burping.no nausea or vomiting . pt has h/o GI Reflux and currently taking Protonix 40 mg daily and also taking Zantac 150 mg bid  without much symptom relief.  Has had a negative H pylori in the past.  Patient was taking lot of Advil about 6 months ago for her back issues but stopped it 6 mths ago.  caffine intake-very rare.  alcohol-none  smoking-none  endoscopy  in the past-none                                                Past Medical History:   Diagnosis Date     Allergic rhinitis, cause unspecified      Disorders of bursae and tendons in shoulder region, unspecified 1/06    right     Endometriosis of other specified sites      Esophageal reflux      FIBROMYALGIA     Neg Rheum work-up     Hyperlipidemia      Impingement syndrome of left shoulder     s/p cortisone injection     Insomnia 12/23/2011     Lateral epicondylitis  of elbow 2/06    left     Plantar fascial fibromatosis      Polyneuropathy in other diseases classified elsewhere (H)      Radial styloid tenosynovitis     bilateral     Sprain of hand, unspecified site 1/06    right thumb MCP joint     Uncomplicated asthma          Current Outpatient Prescriptions   Medication Sig Dispense Refill     estradiol (VIVELLE-DOT) 0.05 MG/24HR BIW patch APPLY ONE PATCH EXTERNALLY to skin TWICE WEEKLY 24 patch 3     melatonin 3 MG tablet Take 1 tablet (3 mg) by mouth nightly as needed for sleep 100 tablet 3     order for DME Orthotics for both feet 1 Package 2     pantoprazole (PROTONIX) 40 MG EC tablet Take 1 tablet (40 mg) by mouth daily 30 tablet 9     pregabalin (LYRICA) 75 MG capsule Take 1 capsule (75 mg) by mouth 3 times daily 90 capsule 5      sucralfate (CARAFATE) 1 GM/10ML suspension Take 10 mLs (1 g) by mouth 4 times daily 420 mL 1     ranitidine (ZANTAC) 150 MG tablet Take 1 tablet (150 mg) by mouth 2 times daily (Patient not taking: Reported on 8/31/2018) 60 tablet 8     traMADol (ULTRAM) 50 MG tablet TAKE ONE TO TWO TABLETS TWICE DAILY AS NEEDED FOR PAIN (Patient not taking: Reported on 8/31/2018) 120 tablet 4       ROS-  GENERAL-negative.  ENT:negative  Cvs;negative  Resp;negative  Gastrointestinal:  Upper abd pain , no n/v      Blood pressure 110/74, pulse 99, temperature 98.2  F (36.8  C), temperature source Oral, resp. rate 16, weight 127 lb 1.6 oz (57.7 kg), SpO2 100 %, not currently breastfeeding.    PHYSICAL EXAMINATION-  GENERAL:healthy, alert and no distress  HEENT-pupils equal and reactive to light and accommodation, oropharynx clear  NECK: NEGATIVE  RESP: Normal - CTA without rales, rhonchi, or wheezing.  CV: regular rate and rhythm  with normal S1, S2 ; no murmur, rub or gallops  GI: no organomegaly, bowel sounds normal, soft, tenderness present epigastric area   MS; no edema, no no calf tenderness      ASSESSMENT AND PLAN:     (K29.70) Gastritis, presence of bleeding unspecified, unspecified chronicity, unspecified gastritis type  (primary encounter diagnosis)  Plan:  started on sucralfate (CARAFATE) 1 GM/10ML suspension as directed.explained clearly about the medication,insructions and side effects. advised to increase Protonix to 40 mg bid as directed,and continue zantac 150 mg bid.Discussed the etiology of Gastritis with patient.   Discussed raising the head of the bed 4 to 6 inches; avoiding chocolate, coffee, peppermint, fruit juices, tomatoes, NSAID's ,greasy and spicy foods.  Will refer to upper endoscopy if no symptom relief.

## 2018-08-31 NOTE — NURSING NOTE
/74  Pulse 99  Temp 98.2  F (36.8  C) (Oral)  Resp 16  Wt 127 lb 1.6 oz (57.7 kg)  SpO2 100%  BMI 22.51 kg/m2  Patient in for consult on upper abdominal pain x's 2 weeks.  Jasmine Georges, CMA

## 2018-08-31 NOTE — MR AVS SNAPSHOT
After Visit Summary   8/31/2018    Goldie Paul    MRN: [id number]           Patient Information     Date Of Birth          1960        Visit Information        Provider Department      8/31/2018 1:40 PM Carmenza Banks MD [facility]        Today's Diagnoses     Gastritis, presence of bleeding unspecified, unspecified chronicity, unspecified gastritis type    -  1       Follow-ups after your visit        Who to contact     If you have questions or need follow up information about today's clinic visit or your schedule please contact [facility] directly at [phone].  Normal or non-critical lab and imaging results will be communicated to you by MyChart, letter or phone within 4 business days after the clinic has received the results. If you do not hear from us within 7 days, please contact the clinic through [employer] or phone. If you have a critical or abnormal lab result, we will notify you by phone as soon as possible.  Submit refill requests through [employer] or call your pharmacy and they will forward the refill request to us. Please allow 3 business days for your refill to be completed.          Additional Information About Your Visit        MyChart Information     [employer] gives you secure access to your electronic health record. If you see a primary care provider, you can also send messages to your care team and make appointments. If you have questions, please call your primary care clinic.  If you do not have a primary care provider, please call [phone] and they will assist you.        Care EveryWhere ID     This is your Care EveryWhere ID. This could be used by other organizations to access your [facility] medical records  [id number]        Your Vitals Were     Pulse Temperature Respirations Pulse Oximetry BMI (Body Mass Index)       99 98.2  F (36.8  C) (Oral) 16 100% 22.51 kg/m2        Blood Pressure from Last 3 Encounters:   08/31/18  110/74   06/05/18 110/60   01/07/18 100/73    Weight from Last 3 Encounters:   08/31/18 127 lb 1.6 oz (57.7 kg)   06/05/18 128 lb (58.1 kg)   12/22/17 129 lb (58.5 kg)              Today, you had the following     No orders found for display         Today's Medication Changes          These changes are accurate as of 8/31/18  5:40 PM.  If you have any questions, ask your nurse or doctor.               Start taking these medicines.        Dose/Directions    sucralfate 1 GM/10ML suspension   Commonly known as:  CARAFATE   Used for:  Gastritis, presence of bleeding unspecified, unspecified chronicity, unspecified gastritis type   Started by:  Carmenza Banks MD        Dose:  1 g   Take 10 mLs (1 g) by mouth 4 times daily   Quantity:  420 mL   Refills:  1            Where to get your medicines      These medications were sent to [address] [address]     Phone:  [phone]     sucralfate 1 GM/10ML suspension                Primary Care Provider Office Phone # Fax #    Misbah Negrete [phone]       [address] [place] [id number]        Equal Access to Services     KAILA LOMBARDO AH: Yanira multanio Sojazminali, waaxda luqadaha, qaybta kaalmada adeegyada, nicki rose hayrd robert. So [facility] [phone].    ATENCIÓN: Si habla español, tiene a cloud disposición servicios gratuitos de asistencia lingüística. Llame al [phone].    We comply with applicable federal civil rights laws and [place] laws. We do not discriminate on the basis of race, color, national origin, age, disability, sex, sexual orientation, or gender identity.            Thank you!     Thank you for choosing [facility]  for your care. Our goal is always to provide you with excellent care. Hearing back from our patients is one way we can continue to improve our services. Please take a few minutes to complete the written survey  that you may receive in the mail after your visit with us. Thank you!             Your Updated Medication List - Protect others around you: Learn how to safely use, store and throw away your medicines at www.disposemymeds.org.          This list is accurate as of 8/31/18  5:40 PM.  Always use your most recent med list.                   Brand Name Dispense Instructions for use Diagnosis    estradiol 0.05 MG/24HR BIW patch    VIVELLE-DOT    24 patch    APPLY ONE PATCH EXTERNALLY to skin TWICE WEEKLY    Symptomatic menopausal or female climacteric states       melatonin 3 MG tablet     100 tablet    Take 1 tablet (3 mg) by mouth nightly as needed for sleep    Insomnia, unspecified type       order for DME     1 Package    Orthotics for both feet    Plantar fasciitis       pantoprazole 40 MG EC tablet    PROTONIX    30 tablet    Take 1 tablet (40 mg) by mouth daily    Gastroesophageal reflux disease without esophagitis       pregabalin 75 MG capsule    LYRICA    90 capsule    Take 1 capsule (75 mg) by mouth 3 times daily    Fibromyalgia       ranitidine 150 MG tablet    ZANTAC    60 tablet    Take 1 tablet (150 mg) by mouth 2 times daily    Gastroesophageal reflux disease without esophagitis       sucralfate 1 GM/10ML suspension    CARAFATE    420 mL    Take 10 mLs (1 g) by mouth 4 times daily    Gastritis, presence of bleeding unspecified, unspecified chronicity, unspecified gastritis type       traMADol 50 MG tablet    ULTRAM    120 tablet    TAKE ONE TO TWO TABLETS TWICE DAILY AS NEEDED FOR PAIN    Fibromyalgia

## 2018-09-26 ENCOUNTER — TRANSFERRED RECORDS (OUTPATIENT)
Dept: HEALTH INFORMATION MANAGEMENT | Facility: CLINIC | Age: 58
End: 2018-09-26

## 2018-10-10 ENCOUNTER — TELEPHONE (OUTPATIENT)
Dept: INTERNAL MEDICINE | Facility: CLINIC | Age: 58
End: 2018-10-10

## 2018-10-10 DIAGNOSIS — Z12.11 ENCOUNTER FOR SCREENING COLONOSCOPY: Primary | ICD-10-CM

## 2018-10-10 NOTE — TELEPHONE ENCOUNTER
Panel Management Review      Patient has the following on her problem list:       IVD   ASA: MONITOR    Last LDL:    Lab Results   Component Value Date    CHOL 221 12/22/2017     Lab Results   Component Value Date    HDL 68 12/22/2017     Lab Results   Component Value Date     12/22/2017     Lab Results   Component Value Date    TRIG 131 12/22/2017        Lab Results   Component Value Date    CHOLHDLRATIO 3.4 03/19/2013        Is the patient on a Statin? NO   Is the patient on Aspirin? NO                    Last three blood pressure readings:  BP Readings from Last 3 Encounters:   08/31/18 110/74   06/05/18 110/60   01/07/18 100/73        Tobacco History:     History   Smoking Status     Never Smoker   Smokeless Tobacco     Never Used         Composite cancer screening  Chart review shows that this patient is due/due soon for the following Colonoscopy  Summary:    Patient is due/failing the following:   COLONOSCOPY    Action needed:   Patient needs office visit for colonoscopy.    Type of outreach:    Discussed at office visit.    Questions for provider review:    None                                                                                                                                    Jasmine Georges CMA       Chart routed to none .

## 2018-11-27 ENCOUNTER — MYC MEDICAL ADVICE (OUTPATIENT)
Dept: INTERNAL MEDICINE | Facility: CLINIC | Age: 58
End: 2018-11-27

## 2018-11-27 NOTE — LETTER
2019     Regarding:  Goldie Paul  [address] [place] [address]   62      To whom it may concern,  I have been asked to write this note in regards to my patient, Goldie Paul.   I have been caring for Goldie since 06 and continue to do so presently. She has asked me to include diagnoses from her past medical history and these are shown below:    Allergic rhinitis  Endometriosis  Gastroesophageal reflux disease  Fibromyalgia  Hyperlipidemia  Impingement syndrome of the left shoulder  Lateral epicondylitis  Plantar fasciitis  Polyneuropathy  Chronic pain syndrome  Insomnia    If you have further questions regarding this information, please feel free to contact me at [phone].  However, further in depth discussion regarding any of these diagnoses   may require an additional release of information signed consent per HIPPA rules depending on the questions posed.     Sincerely,          Misbah Negrete MD  Internal Medicine

## 2018-12-12 DIAGNOSIS — N95.1 SYMPTOMATIC MENOPAUSAL OR FEMALE CLIMACTERIC STATES: ICD-10-CM

## 2018-12-12 RX ORDER — ESTRADIOL 0.05 MG/D
PATCH, EXTENDED RELEASE TRANSDERMAL
Qty: 8 PATCH | Refills: 8 | Status: SHIPPED | OUTPATIENT
Start: 2018-12-12 | End: 2019-12-31

## 2018-12-12 NOTE — TELEPHONE ENCOUNTER
"Requested Prescriptions   Pending Prescriptions Disp Refills     estradiol (VIVELLE-DOT) 0.05 MG/24HR bi-weekly patch [Pharmacy Med Name: Estradiol Transdermal Patch Twice Weekly 0.05 MG/24HR]  2     Sig: APPLY ONE PATCH EXTERNALLY to skin TWICE WEEKLY    Hormone Replacement Therapy Passed - 12/12/2018  7:01 AM       Passed - Blood pressure under 140/90 in past 12 months    BP Readings from Last 3 Encounters:   08/31/18 110/74   06/05/18 110/60   01/07/18 100/73                Passed - Recent (12 mo) or future (30 days) visit within the authorizing provider's specialty    Patient had office visit in the last 12 months or has a visit in the next 30 days with authorizing provider or within the authorizing provider's specialty.  See \"Patient Info\" tab in inbasket, or \"Choose Columns\" in Meds & Orders section of the refill encounter.             Passed - Patient has mammogram in past 2 years on file if age 50-75       Passed - Patient is 18 years of age or older       Passed - No active pregnancy on record       Passed - No positive pregnancy test on record in past 12 months      Prescription approved per [facility] Refill Protocol.      "

## 2018-12-14 DIAGNOSIS — M79.7 FIBROMYALGIA: ICD-10-CM

## 2018-12-14 RX ORDER — PREGABALIN 75 MG
CAPSULE ORAL
Qty: 90 CAPSULE | Refills: 5 | Status: SHIPPED | OUTPATIENT
Start: 2018-12-14 | End: 2019-06-18

## 2018-12-14 NOTE — TELEPHONE ENCOUNTER
Requested Prescriptions   Pending Prescriptions Disp Refills     LYRICA 75 MG capsule [Pharmacy Med Name: Lyrica Oral Capsule 75 MG] 90 capsule 4     Sig: TAKE ONE CAPSULE BY MOUTH THREE TIMES DAILY    There is no refill protocol information for this order        Last Written Prescription Date:  6/5/2018  Last Fill Quantity: 90,  # refills: 5   Last office visit: 8/31/2018 with prescribing provider:  8/31/2018   Future Office Visit:

## 2018-12-14 NOTE — TELEPHONE ENCOUNTER
Routing refill request to provider for review/approval because:  Drug not on the [facility] refill protocol       Amanda RODRIGUEZ RN, BSN, PHN

## 2019-01-03 ENCOUNTER — TRANSFERRED RECORDS (OUTPATIENT)
Dept: HEALTH INFORMATION MANAGEMENT | Facility: CLINIC | Age: 59
End: 2019-01-03

## 2019-04-25 ENCOUNTER — TRANSFERRED RECORDS (OUTPATIENT)
Dept: HEALTH INFORMATION MANAGEMENT | Facility: CLINIC | Age: 59
End: 2019-04-25

## 2019-06-07 NOTE — TELEPHONE ENCOUNTER
Rx for orthotics in [facility] basket. Please mail to pt. Pt informed via [facility] already   Detail Level: Detailed

## 2019-06-18 ENCOUNTER — TRANSFERRED RECORDS (OUTPATIENT)
Dept: HEALTH INFORMATION MANAGEMENT | Facility: CLINIC | Age: 59
End: 2019-06-18

## 2019-06-18 DIAGNOSIS — M79.7 FIBROMYALGIA: ICD-10-CM

## 2019-06-18 RX ORDER — PREGABALIN 75 MG
CAPSULE ORAL
Qty: 90 CAPSULE | Refills: 0
Start: 2019-06-18 | End: 2019-07-18

## 2019-06-18 NOTE — TELEPHONE ENCOUNTER
Requested Prescriptions   Pending Prescriptions Disp Refills     LYRICA 75 MG capsule [Pharmacy Med Name: Lyrica Oral Capsule 75 MG] 90 capsule 4     Sig: TAKE ONE CAPSULE BY MOUTH THREE TIMES DAILY       There is no refill protocol information for this order        Last Written Prescription Date:  12/14/18  Last Fill Quantity: 90,  # refills: 5   Last office visit: 8/31/2018 with prescribing provider:  8/31/18   Future Office Visit:

## 2019-06-19 NOTE — TELEPHONE ENCOUNTER
Pt last seen by me in clinic > 1 year ago. Please call in RF for 30 days to pt's pharmacy as apoproved. Will need to be seen back in clinic in the next karime for f/u pain control. Will consider additional refills after pt seen back in clinic. Assist pt with scheduling appt

## 2019-07-14 DIAGNOSIS — K21.9 GASTROESOPHAGEAL REFLUX DISEASE WITHOUT ESOPHAGITIS: ICD-10-CM

## 2019-07-14 NOTE — TELEPHONE ENCOUNTER
"Requested Prescriptions   Pending Prescriptions Disp Refills     ranitidine (ZANTAC) 150 MG tablet [Pharmacy Med Name: raNITIdine HCl Oral Tablet 150 MG] 60 tablet 7     Sig: Take 1 tablet (150 mg) by mouth 2 times daily   Last Written Prescription Date:  8/22/2018  Last Fill Quantity: 60,  # refills: 8   Last Office Visit: 3/29/2017   Future Office Visit:    Next 5 appointments (look out 90 days)    Jul 18, 2019  4:30 PM CDT  Carolina Rodriguez with Misbah Negrete MD  [facility] [address] [address]  [phone]             H2 Blockers Protocol Passed - 7/14/2019  7:00 AM        Passed - Patient is age 12 or older        Passed - Recent (12 mo) or future (30 days) visit within the authorizing provider's specialty     Patient had office visit in the last 12 months or has a visit in the next 30 days with authorizing provider or within the authorizing provider's specialty.  See \"Patient Info\" tab in inbasket, or \"Choose Columns\" in Meds & Orders section of the refill encounter.              Passed - Medication is active on med list          "

## 2019-07-18 ENCOUNTER — OFFICE VISIT (OUTPATIENT)
Dept: INTERNAL MEDICINE | Facility: CLINIC | Age: 59
End: 2019-07-18
Payer: COMMERCIAL

## 2019-07-18 VITALS
WEIGHT: 128 LBS | BODY MASS INDEX: 23.55 KG/M2 | SYSTOLIC BLOOD PRESSURE: 110 MMHG | RESPIRATION RATE: 16 BRPM | DIASTOLIC BLOOD PRESSURE: 72 MMHG | TEMPERATURE: 98.2 F | OXYGEN SATURATION: 99 % | HEART RATE: 72 BPM | HEIGHT: 62 IN

## 2019-07-18 DIAGNOSIS — Z12.11 SPECIAL SCREENING FOR MALIGNANT NEOPLASMS, COLON: ICD-10-CM

## 2019-07-18 DIAGNOSIS — G89.29 CHRONIC PAIN OF RIGHT KNEE: ICD-10-CM

## 2019-07-18 DIAGNOSIS — M79.7 FIBROMYALGIA: ICD-10-CM

## 2019-07-18 DIAGNOSIS — Z13.1 SCREENING FOR DIABETES MELLITUS: ICD-10-CM

## 2019-07-18 DIAGNOSIS — Z13.6 CARDIOVASCULAR SCREENING; LDL GOAL LESS THAN 130: ICD-10-CM

## 2019-07-18 DIAGNOSIS — M25.561 CHRONIC PAIN OF RIGHT KNEE: ICD-10-CM

## 2019-07-18 DIAGNOSIS — K21.9 GASTROESOPHAGEAL REFLUX DISEASE WITHOUT ESOPHAGITIS: ICD-10-CM

## 2019-07-18 LAB
ALBUMIN SERPL-MCNC: 3.5 G/DL (ref 3.4–5)
ALP SERPL-CCNC: 89 U/L (ref 40–150)
ALT SERPL W P-5'-P-CCNC: 58 U/L (ref 0–50)
ANION GAP SERPL CALCULATED.3IONS-SCNC: 6 MMOL/L (ref 3–14)
AST SERPL W P-5'-P-CCNC: 23 U/L (ref 0–45)
BILIRUB SERPL-MCNC: 0.4 MG/DL (ref 0.2–1.3)
BUN SERPL-MCNC: 13 MG/DL (ref 7–30)
CALCIUM SERPL-MCNC: 8.6 MG/DL (ref 8.5–10.1)
CHLORIDE SERPL-SCNC: 106 MMOL/L (ref 94–109)
CHOLEST SERPL-MCNC: 222 MG/DL
CO2 SERPL-SCNC: 27 MMOL/L (ref 20–32)
CREAT SERPL-MCNC: 0.6 MG/DL (ref 0.52–1.04)
GFR SERPL CREATININE-BSD FRML MDRD: >90 ML/MIN/{1.73_M2}
GLUCOSE SERPL-MCNC: 99 MG/DL (ref 70–99)
HDLC SERPL-MCNC: 57 MG/DL
LDLC SERPL CALC-MCNC: 144 MG/DL
NONHDLC SERPL-MCNC: 165 MG/DL
POTASSIUM SERPL-SCNC: 4.2 MMOL/L (ref 3.4–5.3)
PROT SERPL-MCNC: 7.1 G/DL (ref 6.8–8.8)
SODIUM SERPL-SCNC: 139 MMOL/L (ref 133–144)
TRIGL SERPL-MCNC: 105 MG/DL
TSH SERPL DL<=0.005 MIU/L-ACNC: 2.36 MU/L (ref 0.4–4)

## 2019-07-18 PROCEDURE — 99214 OFFICE O/P EST MOD 30 MIN: CPT | Performed by: INTERNAL MEDICINE

## 2019-07-18 PROCEDURE — 80053 COMPREHEN METABOLIC PANEL: CPT | Performed by: INTERNAL MEDICINE

## 2019-07-18 PROCEDURE — 84443 ASSAY THYROID STIM HORMONE: CPT | Performed by: INTERNAL MEDICINE

## 2019-07-18 PROCEDURE — 36415 COLL VENOUS BLD VENIPUNCTURE: CPT | Performed by: INTERNAL MEDICINE

## 2019-07-18 PROCEDURE — 80061 LIPID PANEL: CPT | Performed by: INTERNAL MEDICINE

## 2019-07-18 RX ORDER — PANTOPRAZOLE SODIUM 40 MG/1
40 TABLET, DELAYED RELEASE ORAL DAILY
Qty: 30 TABLET | Refills: 11 | Status: SHIPPED | OUTPATIENT
Start: 2019-07-18 | End: 2020-08-17

## 2019-07-18 RX ORDER — PREGABALIN 75 MG/1
CAPSULE ORAL
Qty: 90 CAPSULE | Refills: 5 | Status: SHIPPED | OUTPATIENT
Start: 2019-07-18 | End: 2020-04-20

## 2019-07-18 ASSESSMENT — MIFFLIN-ST. JEOR: SCORE: 1113.85

## 2019-07-18 NOTE — PROGRESS NOTES
"Subjective     Goldie Paul is a 59 year old female who presents to clinic today for the following health issues:    HPI   Chief Complaint   Patient presents with     Follow Up     for Fibromyalgia     Pt's past medical history, family history, habits, medications and allergies were reviewed with the patient today.  See snap shot for  HCM status. Most recent lab results reviewed with pt. Problem list and histories reviewed & adjusted, as indicated.  Additional history as below:    Right knee pain. Followed by Dr Herring and  steroid injection 3 mos ago.  Orthopedic note in the chart and reviewed.   Diagnosed with bilateral.oa of the knees.  Pain worse with up stairs.  No falls.  No locking up of the knee.  Slight swelling on the inside part of the  nee  Used  tramadol rarely (3-4 the past month).  Still has some med at home. On Lyrica for fibromyalgia pain that is stable  Denies chest pain, shortness of breath, abdominal pain, headache, vision changes or side effects with medications.  Due for colonoscopy screening  Patient states that she had Shingrix vaccinations done at [facility] pharmacy.  No record in the chart       Additional ROS:   Constitutional, HEENT, Cardiovascular, Pulmonary, GI and , Neuro, MSK and Psych review of systems/symptoms are otherwise negative or unchanged from previous, except as noted above.      OBJECTIVE:  /72   Pulse 72   Temp 98.2  F (36.8  C) (Oral)   Resp 16   Ht 1.575 m (5' 2\")   Wt 58.1 kg (128 lb)   SpO2 99%   BMI 23.41 kg/m     Estimated body mass index is 23.41 kg/m  as calculated from the following:    Height as of this encounter: 1.575 m (5' 2\").    Weight as of this encounter: 58.1 kg (128 lb).     Pulm: Lungs clear to auscultation   CV: Regular rates and rhythm  GI: Soft, nontender, Normal active bowel sounds, No hepatosplenomegaly or masses palpable  Ext: Peripheral pulses intact. No ankle edema.  Neuro: Normal strength and tone, sensory exam grossly normal " except for hypersensitivity to palpation global fibromyalgia trigger points  MSK: tenderness to palpation right knee along the joint line, especially medially.  Slight medial effusion.  Minimal laxity to medial meniscus testing.  No crepitus    Assessment/Plan: (See plan discussion below for further details)  1. Chronic pain of right knee  Degenerative changes seen on previous x-ray per orthopedic note.  Patient to follow-up with Ortho to consider either repeat injection of cortisone versus lubricant therapy    2. Fibromyalgia  Controlled.  Continue current medication  - TSH with free T4 reflex  - pregabalin (LYRICA) 75 MG capsule; TAKE ONE CAPSULE BY MOUTH THREE TIMES DAILY  Dispense: 90 capsule; Refill: 5    3. Gastroesophageal reflux disease without esophagitis  Has recurrence off medication.  Symptoms controlled.  Continue medication  - pantoprazole (PROTONIX) 40 MG EC tablet; Take 1 tablet (40 mg) by mouth daily  Dispense: 30 tablet; Refill: 11  - ranitidine (ZANTAC) 150 MG tablet; Take 1 tablet (150 mg) by mouth 2 times daily  Dispense: 60 tablet; Refill: 11    4. Screening for diabetes mellitus  - Comprehensive metabolic panel    5. Special screening for malignant neoplasms, colon  Due for colonoscopy screening  - GASTROENTEROLOGY ADULT REF PROCEDURE ONLY Other; [facility] [phone]    6. CARDIOVASCULAR SCREENING; LDL GOAL LESS THAN 130  - Lipid panel reflex to direct LDL Fasting      Plan discussion:   Will speak with [facility] pharmacy re: dates of Shingrix to add to chart  Continue current meds  Prescriptions refilled.    Labs today as ordered  Follow up in 1 year. Earlier as needed  Colonoscopy  with  [facility]. They will call to schedule. If not heard from them in 1 week, then call [phone].   Follow-up with  Dr Herring re: your knee  I would recommend you receive an influenza (flu) vaccine in the Fall  (October or November)              Misbah Negrete MD  Internal Medicine  [facility]    (Chart documentation was completed, in part, with Optimal+ voice-recognition software. Even though reviewed, some grammatical, spelling, and word errors may remain.)

## 2019-07-31 DIAGNOSIS — M79.7 FIBROMYALGIA: ICD-10-CM

## 2019-07-31 RX ORDER — PREGABALIN 75 MG/1
CAPSULE ORAL
Qty: 90 CAPSULE | Refills: 0 | OUTPATIENT
Start: 2019-07-31

## 2019-07-31 NOTE — TELEPHONE ENCOUNTER
Lyrica        Duplicate. #90 with 5 refills sent on 7/18/2019      Amanda RODRIGUEZ RN, BSN, PHN

## 2019-08-02 ENCOUNTER — TRANSFERRED RECORDS (OUTPATIENT)
Dept: HEALTH INFORMATION MANAGEMENT | Facility: CLINIC | Age: 59
End: 2019-08-02

## 2019-08-23 ENCOUNTER — TRANSFERRED RECORDS (OUTPATIENT)
Dept: HEALTH INFORMATION MANAGEMENT | Facility: CLINIC | Age: 59
End: 2019-08-23

## 2019-09-30 ENCOUNTER — HEALTH MAINTENANCE LETTER (OUTPATIENT)
Age: 59
End: 2019-09-30

## 2019-09-30 DIAGNOSIS — M79.7 FIBROMYALGIA: ICD-10-CM

## 2019-09-30 NOTE — TELEPHONE ENCOUNTER
traMADol (ULTRAM) 50 MG tablet    Routing refill request to provider for review/approval because:  RX last ordered 6/6/17  LOV was 7/18/19.

## 2019-10-01 RX ORDER — TRAMADOL HYDROCHLORIDE 50 MG/1
TABLET ORAL
Qty: 120 TABLET | Refills: 0 | Status: SHIPPED | OUTPATIENT
Start: 2019-10-01 | End: 2019-12-24

## 2019-12-05 ENCOUNTER — OFFICE VISIT (OUTPATIENT)
Dept: INTERNAL MEDICINE | Facility: CLINIC | Age: 59
End: 2019-12-05
Payer: COMMERCIAL

## 2019-12-05 VITALS
BODY MASS INDEX: 23.05 KG/M2 | TEMPERATURE: 98.4 F | SYSTOLIC BLOOD PRESSURE: 110 MMHG | HEART RATE: 80 BPM | WEIGHT: 126 LBS | DIASTOLIC BLOOD PRESSURE: 68 MMHG | OXYGEN SATURATION: 98 % | RESPIRATION RATE: 16 BRPM

## 2019-12-05 DIAGNOSIS — E78.5 HYPERLIPIDEMIA LDL GOAL <130: ICD-10-CM

## 2019-12-05 DIAGNOSIS — M79.7 FIBROMYALGIA: ICD-10-CM

## 2019-12-05 DIAGNOSIS — M54.9 CHRONIC BILATERAL BACK PAIN, UNSPECIFIED BACK LOCATION: ICD-10-CM

## 2019-12-05 DIAGNOSIS — Z12.11 SPECIAL SCREENING FOR MALIGNANT NEOPLASMS, COLON: ICD-10-CM

## 2019-12-05 DIAGNOSIS — M54.2 CERVICALGIA: ICD-10-CM

## 2019-12-05 DIAGNOSIS — G89.29 CHRONIC BILATERAL BACK PAIN, UNSPECIFIED BACK LOCATION: ICD-10-CM

## 2019-12-05 LAB
ALBUMIN SERPL-MCNC: 3.7 G/DL (ref 3.4–5)
ALP SERPL-CCNC: 87 U/L (ref 40–150)
ALT SERPL W P-5'-P-CCNC: 44 U/L (ref 0–50)
ANION GAP SERPL CALCULATED.3IONS-SCNC: 5 MMOL/L (ref 3–14)
AST SERPL W P-5'-P-CCNC: 20 U/L (ref 0–45)
BILIRUB SERPL-MCNC: 0.4 MG/DL (ref 0.2–1.3)
BUN SERPL-MCNC: 8 MG/DL (ref 7–30)
CALCIUM SERPL-MCNC: 8.9 MG/DL (ref 8.5–10.1)
CHLORIDE SERPL-SCNC: 106 MMOL/L (ref 94–109)
CHOLEST SERPL-MCNC: 244 MG/DL
CO2 SERPL-SCNC: 28 MMOL/L (ref 20–32)
CREAT SERPL-MCNC: 0.61 MG/DL (ref 0.52–1.04)
GFR SERPL CREATININE-BSD FRML MDRD: >90 ML/MIN/{1.73_M2}
GLUCOSE SERPL-MCNC: 100 MG/DL (ref 70–99)
HDLC SERPL-MCNC: 57 MG/DL
LDLC SERPL CALC-MCNC: 164 MG/DL
NONHDLC SERPL-MCNC: 187 MG/DL
POTASSIUM SERPL-SCNC: 4 MMOL/L (ref 3.4–5.3)
PROT SERPL-MCNC: 7.4 G/DL (ref 6.8–8.8)
SODIUM SERPL-SCNC: 139 MMOL/L (ref 133–144)
TRIGL SERPL-MCNC: 113 MG/DL

## 2019-12-05 PROCEDURE — 80053 COMPREHEN METABOLIC PANEL: CPT | Performed by: INTERNAL MEDICINE

## 2019-12-05 PROCEDURE — 80061 LIPID PANEL: CPT | Performed by: INTERNAL MEDICINE

## 2019-12-05 PROCEDURE — 36415 COLL VENOUS BLD VENIPUNCTURE: CPT | Performed by: INTERNAL MEDICINE

## 2019-12-05 PROCEDURE — 99214 OFFICE O/P EST MOD 30 MIN: CPT | Performed by: INTERNAL MEDICINE

## 2019-12-05 RX ORDER — DULOXETIN HYDROCHLORIDE 30 MG/1
30 CAPSULE, DELAYED RELEASE ORAL 2 TIMES DAILY
Qty: 60 CAPSULE | Refills: 11 | Status: SHIPPED | OUTPATIENT
Start: 2019-12-05 | End: 2020-11-13

## 2019-12-05 ASSESSMENT — ANXIETY QUESTIONNAIRES
6. BECOMING EASILY ANNOYED OR IRRITABLE: NOT AT ALL
3. WORRYING TOO MUCH ABOUT DIFFERENT THINGS: NOT AT ALL
5. BEING SO RESTLESS THAT IT IS HARD TO SIT STILL: NOT AT ALL
7. FEELING AFRAID AS IF SOMETHING AWFUL MIGHT HAPPEN: NOT AT ALL
1. FEELING NERVOUS, ANXIOUS, OR ON EDGE: NOT AT ALL
2. NOT BEING ABLE TO STOP OR CONTROL WORRYING: NOT AT ALL
GAD7 TOTAL SCORE: 0
IF YOU CHECKED OFF ANY PROBLEMS ON THIS QUESTIONNAIRE, HOW DIFFICULT HAVE THESE PROBLEMS MADE IT FOR YOU TO DO YOUR WORK, TAKE CARE OF THINGS AT HOME, OR GET ALONG WITH OTHER PEOPLE: NOT DIFFICULT AT ALL

## 2019-12-05 ASSESSMENT — PATIENT HEALTH QUESTIONNAIRE - PHQ9
5. POOR APPETITE OR OVEREATING: NOT AT ALL
SUM OF ALL RESPONSES TO PHQ QUESTIONS 1-9: 1

## 2019-12-05 NOTE — PATIENT INSTRUCTIONS
Referral to [facility] for physical therapy for upper/lower back. They will call to schedule   Duloxetine 30mg capsule, 1 capsule daily in AM with food for the next 2 weeks. If better and no side effects, then continue same. If no side effects but feel could be better still, then increase to 1 capsule twice a day (or 2 capsules in AM if any insomnia with higher dosage)   Update me in Mychart in 4 weeks or earlier if issues with new med   Continue other medication  Labs as ordered   Colonoscopy  with  [facility]. They will call to schedule. If not heard from them in 1 week, then call [phone].  Hold Advil (NSAIDS)  for 5 days prior to the procedure  Mammogram at [facility] before end of the year. Have them fax report to us [phone]

## 2019-12-05 NOTE — PROGRESS NOTES
Subjective     Goldie Paul is a 59 year old female who presents to clinic today for the following health issues:    HPI   Chronic Pain Follow-Up       Type / Location of Pain: Fibromyalgia  Analgesia/pain control:       Recent changes:  Worse-with in the last 6 MONTHS      Overall control: Tolerable with discomfort  Activity level/function:      Daily activities:  Able to do all daily activities    Work:  Pain does not limit any  work  Adverse effects:  No  Adherance    Taking medication as directed?  Yes    Participating in other treatments: no   Risk Factors:    Sleep:  Fair    Mood/anxiety:  controlled    Recent family or social stressors:  none noted    Other aggravating factors: prolonged sitting, prolonged standing and poor posture  PHQ-9 score:    PHQ-9 SCORE 12/5/2019   PHQ-9 Total Score 1     CHALO-7 SCORE 5/5/2016 12/22/2017 12/5/2019   Total Score 0 1 0     Encounter-Level CSA - 09/12/2014:    Controlled Substance Agreement - Scan on 9/15/2014 10:31 AM: Controlled Substance  Agreement 9/12/14     Patient-Level CSA:    There are no patient-level csa.         How many servings of fruits and vegetables do you eat daily?  4 or more    On average, how many sweetened beverages do you drink each day (Examples: soda, juice, sweet tea, etc.  Do NOT count diet or artificially sweetened beverages)?   0    How many days per week do you miss taking your medication? 0    Pt's past medical history, family history, habits, medications and allergies were reviewed with the patient today.  See snap shot for  HCM status. Most recent lab results reviewed with pt. Problem list and histories reviewed & adjusted, as indicated.  Additional history as below:     Increasing pain shoulders na neck area. Also some tingling in   L>R feet and some back pain.  Rare use of Flexeril. Took some yesterday and better. No hangover feeling  this AM from dose buit will get sometimes with use  Minimal  Exercise recently  Denies chest pain,  "shortness of breath, abdominal pain, headache, vision changes or side effects with medications.  Using tramadol occ. About every other day taking one past 2 mos  Right knee chondromalacia and had Synvisc. Steroids longer working with injections. Also left knee pain .Seeing [facility]       Lab Results   Component Value Date    GLC 99 07/18/2019     No results found for: A1C  Lab Results   Component Value Date    CHOL 222 07/18/2019     Lab Results   Component Value Date     07/18/2019     Lab Results   Component Value Date    HDL 57 07/18/2019     Lab Results   Component Value Date    TRIG 105 07/18/2019     Lab Results   Component Value Date    CR 0.60 07/18/2019     Lab Results   Component Value Date    ALT 58 07/18/2019     Lab Results   Component Value Date    AST 23 07/18/2019     No results found for: MICROL  Lab Results   Component Value Date    TSH 2.36 07/18/2019       No identified additional risks  The 10-year ASCVD risk score (Mau LOBATO Jr., et al., 2013) is: 2.1%    Values used to calculate the score:      Age: 59 years      Sex: Female      Is Non- : No      Diabetic: No      Tobacco smoker: No      Systolic Blood Pressure: 110 mmHg      Is BP treated: No      HDL Cholesterol: 57 mg/dL      Total Cholesterol: 222 mg/dL      Additional ROS:   Constitutional, HEENT, Cardiovascular, Pulmonary, GI and , Neuro, MSK and Psych review of systems/symptoms are otherwise negative or unchanged from previous, except as noted above.      OBJECTIVE:  /68   Pulse 80   Temp 98.4  F (36.9  C) (Temporal)   Resp 16   Wt 57.2 kg (126 lb)   SpO2 98%   BMI 23.05 kg/m     Estimated body mass index is 23.05 kg/m  as calculated from the following:    Height as of 7/18/19: 1.575 m (5' 2\").    Weight as of this encounter: 57.2 kg (126 lb).     Neck: no adenopathy. Thyroid normal to palpation. No bruits.  Mild tenderness to palpation bilateral cervical musculature.   Pulm: Lungs clear to " auscultation   CV: Regular rates and rhythm  GI: Soft, nontender, Normal active bowel sounds, No hepatosplenomegaly or masses palpable  Ext: Peripheral pulses intact. No edema.  Neuro: Normal strength and tone, sensory exam grossly normal. Global fibromyalgia trigger point tenderness  Back: Tenderness to palpation bilateral paralumbar area. Neg SLRT bilaterally.        Assessment/Plan: (See plan discussion below for further details)  1. Fibromyalgia  Chronic and needing improved control.  Already on Lyrica.  Will add duloxetine along with physical therapy for upper and lower back  - [facility]ERR[facility]; Future  - DULoxetine (CYMBALTA) 30 MG capsule; Take 1 capsule (30 mg) by mouth 2 times daily  Dispense: 60 capsule; Refill: 11    2. Cervicalgia  No radicular symptoms to the upper extremities.   W MRI greater than 10 years ago ill have patient work on physical therapy  - [facility]FERR[facility]; Future    3. Chronic bilateral back pain, unspecified back location  Showed mild L4-5 and L5-S1 degenerative changes.  Rare tingling in the feet.  No bowel/bladder incontinence or lower extremity weakness.  Patient to see physical therapy  - [facility]FERR[facility]; Future    4. Hyperlipidemia LDL goal <130  History mild elevation.  CAD risk not requiring statin therapy at this time.  Has been working on diet.  Will recheck  - Comprehensive metabolic panel  - Lipid panel reflex to direct LDL Fasting    5. Special screening for malignant neoplasms, colon  - GASTROENTEROLOGY ADULT REF PROCEDURE ONLY Other; [facility] [phone]    Plan discussion:   Referral to [facility] for physical therapy for upper/lower back. They will call to schedule   Duloxetine 30mg capsule, 1 capsule daily in AM with food for the next 2 weeks. If better and no side effects, then continue same. If no side effects but feel could be better still, then increase to 1 capsule twice a day (or 2 capsules in AM if  any insomnia with higher dosage)   Update me in Mychart in 4 weeks or earlier if issues with new med   Continue other medication  Labs as ordered   Colonoscopy  with  [facility]. They will call to schedule. If not heard from them in 1 week, then call [phone].  Hold Advil (NSAIDS)  for 5 days prior to the procedure  Mammogram at [facility] before end of the year. Have them fax report to us [phone]         Misbah Negrete MD  Internal Medicine Department  [facility]    (Chart documentation was completed, in part, with [employer] voice-recognition software. Even though reviewed, some grammatical, spelling, and word errors may remain.)

## 2019-12-06 ASSESSMENT — ANXIETY QUESTIONNAIRES: GAD7 TOTAL SCORE: 0

## 2019-12-13 ENCOUNTER — TRANSFERRED RECORDS (OUTPATIENT)
Dept: HEALTH INFORMATION MANAGEMENT | Facility: CLINIC | Age: 59
End: 2019-12-13

## 2019-12-23 DIAGNOSIS — M79.7 FIBROMYALGIA: ICD-10-CM

## 2019-12-23 NOTE — TELEPHONE ENCOUNTER
Tramadol      Last Written Prescription Date:  10/1/2019  Last Fill Quantity: 120,   # refills: 0  Last Office Visit: 12/5/2019  Future Office visit:       Routing refill request to provider for review/approval because:  Drug not on the [facility], [facility] or [facility] refill protocol or controlled substance      Amanda ROLANDN, RN, PHN

## 2019-12-24 RX ORDER — TRAMADOL HYDROCHLORIDE 50 MG/1
TABLET ORAL
Qty: 120 TABLET | Refills: 0 | Status: SHIPPED | OUTPATIENT
Start: 2019-12-24 | End: 2020-04-16

## 2019-12-26 ENCOUNTER — TELEPHONE (OUTPATIENT)
Dept: INTERNAL MEDICINE | Facility: CLINIC | Age: 59
End: 2019-12-26

## 2019-12-26 NOTE — TELEPHONE ENCOUNTER
Prior Authorization Retail Medication Request    Medication/Dose: traMADol (ULTRAM) 50 MG tablet  ICD code (if different than what is on RX):  M79.7  Previously Tried and Failed:    Rationale:      Insurance Name:  [employer]  Insurance ID:  [id number]      Pharmacy Information (if different than what is on RX)  Name:  [facility]  Phone:  [phone]

## 2019-12-27 NOTE — TELEPHONE ENCOUNTER
Central Prior Authorization Team   Phone: [phone]      PA Initiation    Medication: traMADol (ULTRAM) 50 MG tablet-Initiated  Insurance Company: [employer] - Phone [phone] Fax [phone]  Pharmacy Filling the Rx: [address]  Filling Pharmacy Phone: [phone]  Filling Pharmacy Fax:    Start Date: 12/27/2019

## 2019-12-30 DIAGNOSIS — N95.1 SYMPTOMATIC MENOPAUSAL OR FEMALE CLIMACTERIC STATES: ICD-10-CM

## 2019-12-30 NOTE — TELEPHONE ENCOUNTER
Prior Authorization Approval    Authorization Effective Date: 12/27/2019  Authorization Expiration Date: 6/24/2020  Medication: traMADol (ULTRAM) 50 MG tablet-APPROVED  Approved Dose/Quantity:   Reference #:     Insurance Company: [employer] - Phone [phone] Fax [phone]  Expected CoPay:       CoPay Card Available:      Foundation Assistance Needed:    Which Pharmacy is filling the prescription (Not needed for infusion/clinic administered): [facility] [address] [place] [address]  Pharmacy Notified: Yes  Patient Notified: No    Pharmacy will notify patient when medication is ready.

## 2019-12-30 NOTE — TELEPHONE ENCOUNTER
"Requested Prescriptions   Pending Prescriptions Disp Refills     estradiol (VIVELLE-DOT) 0.05 MG/24HR bi-weekly patch [Pharmacy Med Name: Estradiol Transdermal Patch Twice Weekly 0.05 MG/24HR] 8 patch 7     Sig: APPLY ONE PATCH EXTERNALLY to skin TWICE WEEKLY.   Last Written Prescription Date:  12/12/18  Last Fill Quantity: 8,  # refills: 8   Last office visit: 12/5/2019 with prescribing provider:  12/5/19   Future Office Visit:        Hormone Replacement Therapy Passed - 12/30/2019  7:22 AM        Passed - Blood pressure under 140/90 in past 12 months     BP Readings from Last 3 Encounters:   12/05/19 110/68   07/18/19 110/72   08/31/18 110/74                 Passed - Recent (12 mo) or future (30 days) visit within the authorizing provider's specialty     Patient has had an office visit with the authorizing provider or a provider within the authorizing providers department within the previous 12 mos or has a future within next 30 days. See \"Patient Info\" tab in inbasket, or \"Choose Columns\" in Meds & Orders section of the refill encounter.              Passed - Patient has mammogram in past 2 years on file if age 50-75        Passed - Medication is active on med list        Passed - Patient is 18 years of age or older        Passed - No active pregnancy on record        Passed - No positive pregnancy test on record in past 12 months          "

## 2019-12-31 RX ORDER — ESTRADIOL 0.05 MG/D
PATCH, EXTENDED RELEASE TRANSDERMAL
Qty: 8 PATCH | Refills: 7 | Status: SHIPPED | OUTPATIENT
Start: 2019-12-31 | End: 2020-09-11

## 2019-12-31 NOTE — TELEPHONE ENCOUNTER
Prescription approved per [facility] Refill Protocol.    Amanda ROLANDN, RN, PHN

## 2020-01-15 DIAGNOSIS — M79.7 FIBROMYALGIA: ICD-10-CM

## 2020-01-15 NOTE — TELEPHONE ENCOUNTER
flexeril      Last Written Prescription Date:  7/11/17  Last Fill Quantity: 180,   # refills: 1  Last Office Visit: 12/5/19  Future Office visit:       Routing refill request to provider for review/approval because:  Drug not on the [facility] or [facility] refill protocol or controlled substance  Drug not active on patient's medication list

## 2020-01-16 RX ORDER — CYCLOBENZAPRINE HCL 5 MG
TABLET ORAL
Qty: 60 TABLET | Refills: 2 | Status: SHIPPED | OUTPATIENT
Start: 2020-01-16 | End: 2020-04-20

## 2020-04-13 ENCOUNTER — MYC REFILL (OUTPATIENT)
Dept: INTERNAL MEDICINE | Facility: CLINIC | Age: 60
End: 2020-04-13

## 2020-04-13 DIAGNOSIS — M79.7 FIBROMYALGIA: ICD-10-CM

## 2020-04-13 RX ORDER — TRAMADOL HYDROCHLORIDE 50 MG/1
TABLET ORAL
Qty: 120 TABLET | Refills: 0 | OUTPATIENT
Start: 2020-04-13

## 2020-04-13 NOTE — TELEPHONE ENCOUNTER
With narcotic use, pt requires appt now every 3 mos. Last appt 12/5/19. Schedule pt for tele visit sometime this week and will address RF as appropriate after talking with pt. Rx refill for tramadol deferred until appt completed. Inform pt and assist with scheduling

## 2020-04-15 ENCOUNTER — MYC MEDICAL ADVICE (OUTPATIENT)
Dept: INTERNAL MEDICINE | Facility: CLINIC | Age: 60
End: 2020-04-15

## 2020-04-16 ENCOUNTER — MYC REFILL (OUTPATIENT)
Dept: INTERNAL MEDICINE | Facility: CLINIC | Age: 60
End: 2020-04-16

## 2020-04-16 ENCOUNTER — MYC MEDICAL ADVICE (OUTPATIENT)
Dept: INTERNAL MEDICINE | Facility: CLINIC | Age: 60
End: 2020-04-16

## 2020-04-16 DIAGNOSIS — M79.7 FIBROMYALGIA: ICD-10-CM

## 2020-04-16 RX ORDER — TRAMADOL HYDROCHLORIDE 50 MG/1
TABLET ORAL
Qty: 30 TABLET | Refills: 0 | Status: SHIPPED | OUTPATIENT
Start: 2020-04-16 | End: 2020-05-02

## 2020-04-16 RX ORDER — TRAMADOL HYDROCHLORIDE 50 MG/1
TABLET ORAL
Qty: 120 TABLET | Refills: 0 | Status: CANCELLED | OUTPATIENT
Start: 2020-04-16

## 2020-04-16 NOTE — TELEPHONE ENCOUNTER
Tramadol      Last Written Prescription Date:  12/24/2019  Last Fill Quantity: 120,   # refills: 0  Last Office Visit: 12/5/2019  Future Office visit:       Routing refill request to provider for review/approval because:  Drug not on the [facility], [facility] or [facility] refill protocol or controlled substance    Amanda ROLANDN, RN, PHN

## 2020-04-19 DIAGNOSIS — R73.9 BLOOD GLUCOSE ELEVATED: ICD-10-CM

## 2020-04-19 DIAGNOSIS — E78.5 HYPERLIPIDEMIA LDL GOAL <130: Primary | ICD-10-CM

## 2020-04-20 DIAGNOSIS — M79.7 FIBROMYALGIA: ICD-10-CM

## 2020-04-20 RX ORDER — PREGABALIN 75 MG/1
CAPSULE ORAL
Qty: 90 CAPSULE | Refills: 0 | Status: SHIPPED | OUTPATIENT
Start: 2020-04-20 | End: 2020-05-02

## 2020-04-20 RX ORDER — CYCLOBENZAPRINE HCL 5 MG
TABLET ORAL
Qty: 60 TABLET | Refills: 0 | Status: SHIPPED | OUTPATIENT
Start: 2020-04-20 | End: 2020-05-02

## 2020-05-01 ENCOUNTER — VIRTUAL VISIT (OUTPATIENT)
Dept: INTERNAL MEDICINE | Facility: CLINIC | Age: 60
End: 2020-05-01
Payer: COMMERCIAL

## 2020-05-01 DIAGNOSIS — E78.5 HYPERLIPIDEMIA LDL GOAL <100: ICD-10-CM

## 2020-05-01 DIAGNOSIS — R73.9 BLOOD GLUCOSE ELEVATED: ICD-10-CM

## 2020-05-01 DIAGNOSIS — G89.4 CHRONIC PAIN SYNDROME: ICD-10-CM

## 2020-05-01 DIAGNOSIS — M79.7 FIBROMYALGIA: ICD-10-CM

## 2020-05-01 PROCEDURE — 99214 OFFICE O/P EST MOD 30 MIN: CPT | Mod: 95 | Performed by: INTERNAL MEDICINE

## 2020-05-01 ASSESSMENT — ANXIETY QUESTIONNAIRES
1. FEELING NERVOUS, ANXIOUS, OR ON EDGE: NOT AT ALL
7. FEELING AFRAID AS IF SOMETHING AWFUL MIGHT HAPPEN: NOT AT ALL
3. WORRYING TOO MUCH ABOUT DIFFERENT THINGS: NOT AT ALL
5. BEING SO RESTLESS THAT IT IS HARD TO SIT STILL: NOT AT ALL
6. BECOMING EASILY ANNOYED OR IRRITABLE: NOT AT ALL
GAD7 TOTAL SCORE: 0
IF YOU CHECKED OFF ANY PROBLEMS ON THIS QUESTIONNAIRE, HOW DIFFICULT HAVE THESE PROBLEMS MADE IT FOR YOU TO DO YOUR WORK, TAKE CARE OF THINGS AT HOME, OR GET ALONG WITH OTHER PEOPLE: NOT DIFFICULT AT ALL
2. NOT BEING ABLE TO STOP OR CONTROL WORRYING: NOT AT ALL

## 2020-05-01 ASSESSMENT — PATIENT HEALTH QUESTIONNAIRE - PHQ9
SUM OF ALL RESPONSES TO PHQ QUESTIONS 1-9: 1
5. POOR APPETITE OR OVEREATING: NOT AT ALL

## 2020-05-01 NOTE — PROGRESS NOTES
"Goldie Paul is a 59 year old female who is being evaluated via a billable telephone visit.      The patient has been notified of following:     \"This telephone visit will be conducted via a call between you and your physician/provider. We have found that certain health care needs can be provided without the need for a physical exam.  This service lets us provide the care you need with a short phone conversation.  If a prescription is necessary we can send it directly to your pharmacy.  If lab work is needed we can place an order for that and you can then stop by our lab to have the test done at a later time.    Telephone visits are billed at different rates depending on your insurance coverage. During this emergency period, for some insurers they may be billed the same as an in-person visit.  Please reach out to your insurance provider with any questions.    If during the course of the call the physician/provider feels a telephone visit is not appropriate, you will not be charged for this service.\"    Patient has given verbal consent for Telephone visit?  Yes    What phone number would you like to be contacted at? [phone]    How would you like to obtain your AVS? Carolina Boyer     Goldie Paul is a 59 year old female who presents to clinic today for the following health issues:    HPI  Chronic Pain Follow-Up    Where in your body do you have pain? Fibromyalgia  How has your pain affected your ability to work? Pain does not limit ability to work  Which of these pain treatments have you tried since your last clinic visit? Chiropractor and Massage  How well are you sleeping? Fair  How has your mood been since your last visit? About the same  Have you had a significant life event? No  Other aggravating factors: prolonged sitting and prolonged standing  Taking medication as directed? Yes    PHQ 12/5/2019 12/5/2019 5/1/2020   PHQ-9 Total Score 1 1 1   Q9: Thoughts of better off dead/self-harm past 2 " weeks Not at all Not at all Not at all     CHALO-7 SCORE 12/22/2017 12/5/2019 5/1/2020   Total Score 1 0 0     Encounter-Level CSA - 09/12/2014:    Controlled Substance Agreement - Scan on 9/15/2014 10:31 AM: Controlled Substance  Agreement 9/12/14     Patient-Level CSA:    There are no patient-level csa.         How many servings of fruits and vegetables do you eat daily?  4 or more    On average, how many sweetened beverages do you drink each day (Examples: soda, juice, sweet tea, etc.  Do NOT count diet or artificially sweetened beverages)?   0    How many days per week do you exercise enough to make your heart beat faster? 5-Walking    How many minutes a day do you exercise enough to make your heart beat faster? 30 - 60    How many days per week do you miss taking your medication? 0    Due to the current impact of the Covid19 virus and recommendations by [facility] administration, CDC, etc to limit  clinic visits and pt exposure risk, was recommend pt proceed with virtual phone visit to address acute/stable  medical needs with plan to defer other non-acute health maintenance issues/exam to a future date when less self-isolation is required.    I have reviewed the nursing note as documented above.   See below for other information/data  and my personal notes capturing the substance of my conversation with the patient.       HPI:   Chronic history of fibromyalgia.  Pain throughout the extremities and trunk but mostly concentrated in the neck and bilateral shoulder area.  Patient was recently on duloxetine twice a day but this made her too sleepy and so she reduced the dosing to once a day.  Patient questions whether her pain has worsened since going on duloxetine or whether it is due to other issues such as stress with father-in-law recently passing away and general coronavirus pandemic restrictions.  Patient denies actual depression.  Patient had previously been treated  by chiropractor amd massage  and found both helpful  but has not been unable to do this due to their office being closed with coronavirus pandemic.  Patient taking tramadol 1 tab twice a day along with occasional ibuprofen well also on Lyrica 3 times daily.  Patient has not been able to take higher doses of Lyrica throughout the day because of fatigue but wonders if she could take a higher dose at bedtime.  Patient is also taking cyclobenzaprine at bedtime for neck and upper body musculoskeletal pain.  She has had fatigue issues when trying to take the medication during the day.  Denies exertional chest pain, shortness of breath, abdominal pain.  Bowel movements normal.   Also has hx hyperlipidemia. LAst lipids as below. CAD risk below that where statin is needed    Component      Latest Ref Rng & Units 12/5/2019   Sodium      133 - 144 mmol/L 139   Potassium      3.4 - 5.3 mmol/L 4.0   Chloride      94 - 109 mmol/L 106   Carbon Dioxide      20 - 32 mmol/L 28   Anion Gap      3 - 14 mmol/L 5   Glucose      70 - 99 mg/dL 100 (H)   Urea Nitrogen      7 - 30 mg/dL 8   Creatinine      0.52 - 1.04 mg/dL 0.61   GFR Estimate      >60 mL/min/1.73:m2 >90   GFR Estimate If Black      >60 mL/min/1.73:m2 >90   Calcium      8.5 - 10.1 mg/dL 8.9   Bilirubin Total      0.2 - 1.3 mg/dL 0.4   Albumin      3.4 - 5.0 g/dL 3.7   Protein Total      6.8 - 8.8 g/dL 7.4   Alkaline Phosphatase      40 - 150 U/L 87   ALT      0 - 50 U/L 44   AST      0 - 45 U/L 20   Cholesterol      <200 mg/dL 244 (H)   Triglycerides      <150 mg/dL 113   HDL Cholesterol      >49 mg/dL 57   LDL Cholesterol Calculated      <100 mg/dL 164 (H)   Non HDL Cholesterol      <130 mg/dL 187 (H)         No identified additional risks  The 10-year ASCVD risk score (Mauelan LOBATO Jr., et al., 2013) is: 2.3%    Values used to calculate the score:      Age: 59 years      Sex: Female      Is Non- : No      Diabetic: No      Tobacco smoker: No      Systolic Blood Pressure: 110 mmHg      Is BP treated: No       HDL Cholesterol: 57 mg/dL      Total Cholesterol: 244 mg/dL    Additional ROS:   Constitutional, HEENT, Cardiovascular, Pulmonary, GI and , Neuro, MSK and Psych review of systems/symptoms are otherwise negative or unchanged from previous, except as noted above.      ASSESSMENT:   1. Fibromyalgia  Overall controlled though some recent flare recently given absence of chiropractic and massage therapy.  Patient wondering if duloxetine making symptoms any worse.  She will therefore try putting duloxetine aside for a week to see if pain improves.  If not, then patient to restart that medication.  After that, patient may also try increasing Lyrica to 1 capsule in the morning, 1 capsule in the afternoon and 2 capsules at bedtime to see if it helps pain without causing increased daytime sleepiness.  Continue other medications  - traMADol (ULTRAM) 50 MG tablet; 1 tab twice a day for pain  Dispense: 60 tablet; Refill: 2  - pregabalin (LYRICA) 75 MG capsule; TAKE ONE CAPSULE BY MOUTH THREE TIMES DAILY  Dispense: 90 capsule; Refill: 5  - cyclobenzaprine (FLEXERIL) 5 MG tablet; TAKE ONE OR TWO TABLETS BY MOUTH AT BEDTIME AS NEEDED for back pain or spasms  Dispense: 60 tablet; Refill: 11    2. Chronic pain syndrome   CSA on file and [facility]  reviewed and appropriate. Will have f/u in 3 mos    3. Hyperlipidemia LDL goal <100  Elevation not to the point of requiring statin therapy.  Counseled regarding dietary changes exercise as tolerated and will recheck lipids in 6 months as previously ordered    4. Blood glucose elevated  Minimal.  Counseled regarding carbohydrate reduction and exercise as tolerated.  Recheck A1c and fasting glucose in 6 months as previously ordered      PLAN:  Try stopping duloxetine for a week to see if pain improves.  If not, then restart 1 tablet daily  After that, may try increasing Lyrica to 2 capsules at bedtime while continuing 1 capsule in the morning and afternoon to see if helps pain control without  causing increased daytime fatigue.  It dose is increased and tolerated, then contact physician for prescription change with increased quantity of medication  Continue other medications  Repeat fasting labs in 6 months  Reduce saturated fats and starchy foods in diet.  Increase walking or other exercise as able  Continue stretching exercises.  Follow-up with chiropractor and massage when able based on coronavirus pandemic restrictions  See me in 3 months for follow-up chronic pain management given narcotic Tramadol use    Phone call contact time  Call Started at 4:59 pm  Call Ended at 5:14 pm  Total minutes: 15 min    (Chart documentation was completed, in part, with [employer] voice-recognition software. Even though reviewed, some grammatical, spelling, and word errors may remain.)    Misbah Negrete MD  Internal Medicine Department  [facility]

## 2020-05-02 RX ORDER — CYCLOBENZAPRINE HCL 5 MG
TABLET ORAL
Qty: 60 TABLET | Refills: 11 | Status: SHIPPED | OUTPATIENT
Start: 2020-05-02 | End: 2021-12-31

## 2020-05-02 RX ORDER — PREGABALIN 75 MG/1
CAPSULE ORAL
Qty: 90 CAPSULE | Refills: 5 | Status: SHIPPED | OUTPATIENT
Start: 2020-05-02 | End: 2021-01-26

## 2020-05-02 RX ORDER — TRAMADOL HYDROCHLORIDE 50 MG/1
TABLET ORAL
Qty: 60 TABLET | Refills: 2 | Status: SHIPPED | OUTPATIENT
Start: 2020-05-02 | End: 2021-06-17

## 2020-05-02 ASSESSMENT — ANXIETY QUESTIONNAIRES: GAD7 TOTAL SCORE: 0

## 2020-05-03 PROBLEM — E78.5 HYPERLIPIDEMIA LDL GOAL <100: Status: ACTIVE | Noted: 2020-05-03

## 2020-05-03 NOTE — PATIENT INSTRUCTIONS
Try stopping duloxetine for a week to see if pain improves.  If not, then restart 1 tablet daily  After that, may try increasing Lyrica to 2 capsules at bedtime while continuing 1 capsule in the morning and afternoon to see if helps pain control without causing increased daytime fatigue.  It dose is increased and tolerated, then contact physician for prescription change with increased quantity of medication  Continue other medications  Repeat fasting labs in 6 months  Reduce saturated fats and starchy foods in diet.  Increase walking or other exercise as able  Continue stretching exercises.  Follow-up with chiropractor and massage when able based on coronavirus pandemic restrictions  See me in 3 months for follow-up chronic pain management given narcotic Tramadol use

## 2020-06-29 ENCOUNTER — TRANSFERRED RECORDS (OUTPATIENT)
Dept: HEALTH INFORMATION MANAGEMENT | Facility: CLINIC | Age: 60
End: 2020-06-29

## 2020-07-24 ENCOUNTER — TRANSFERRED RECORDS (OUTPATIENT)
Dept: HEALTH INFORMATION MANAGEMENT | Facility: CLINIC | Age: 60
End: 2020-07-24

## 2020-08-25 ENCOUNTER — MYC MEDICAL ADVICE (OUTPATIENT)
Dept: INTERNAL MEDICINE | Facility: CLINIC | Age: 60
End: 2020-08-25

## 2020-08-27 ENCOUNTER — VIRTUAL VISIT (OUTPATIENT)
Dept: INTERNAL MEDICINE | Facility: CLINIC | Age: 60
End: 2020-08-27
Payer: COMMERCIAL

## 2020-08-27 DIAGNOSIS — R25.2 LEG CRAMPS: Primary | ICD-10-CM

## 2020-08-27 PROCEDURE — 99213 OFFICE O/P EST LOW 20 MIN: CPT | Mod: 95 | Performed by: INTERNAL MEDICINE

## 2020-08-27 NOTE — PROGRESS NOTES
"Goldie Paul is a 60 year old female who is being evaluated via a billable telephone visit.      The patient has been notified of following:     \"This telephone visit will be conducted via a call between you and your physician/provider. We have found that certain health care needs can be provided without the need for a physical exam.  This service lets us provide the care you need with a short phone conversation.  If a prescription is necessary we can send it directly to your pharmacy.  If lab work is needed we can place an order for that and you can then stop by our lab to have the test done at a later time.    Telephone visits are billed at different rates depending on your insurance coverage. During this emergency period, for some insurers they may be billed the same as an in-person visit.  Please reach out to your insurance provider with any questions.    If during the course of the call the physician/provider feels a telephone visit is not appropriate, you will not be charged for this service.\"    Patient has given verbal consent for Telephone visit?  Yes    What phone number would you like to be contacted at? [phone]    How would you like to obtain your AVS? [place]    Subjective     Goldie Paul is a 60 year old female who presents via phone visit today for the following health issues:    HPI  Chief Complaint   Patient presents with     Musculoskeletal Problem     Patient c/o Nocturnal Leg Cramping, x 2 weeks.      Due to the current impact of the Covid19 virus and recommendations by [facility] administration, CDC, etc to limit  clinic visits and pt exposure risk, was recommend pt proceed with virtual phone visit to address acute/stable  medical needs with plan to defer other non-acute health maintenance issues/exam to a future date when less self-isolation is required.    I have reviewed the nursing note as documented above.   See below for other information/data  and my personal notes capturing the " substance of my conversation with the patient.       HPI:   Leg cramps  Past 2 weeks. Not related to exercise.  Maintaining hydration  None  last night or  night before  Not having any symptoms during the daytime.  Overall stable fibromyalgia.  On tramadol and cyclobenzaprine and duloxetine     Additional ROS:   Constitutional, HEENT, Cardiovascular, Pulmonary, GI and , Neuro, MSK and Psych review of systems/symptoms are otherwise negative or unchanged from previous, except as noted above.      ASSESSMENT:    1. Leg cramps  Asymptomatic the last 2 nights.  Labs as ordered rule out nutritional deficiency.  If normal and symptoms recur, patient inform physician and will then add Requip or Mirapex  - Iron and iron binding capacity; Future  - Basic metabolic panel; Future  - Ferritin; Future  - Magnesium; Future    PLAN:   Labs for leg cramps   Possible future   Requip/Mirapex for leg cramps if recurrence.  Continue with medications    Phone call contact time  Call Started at 4:36pm  Call Ended at 4:46pm  Total minutes: 10 min    (Chart documentation was completed, in part, with [employer] voice-recognition software. Even though reviewed, some grammatical, spelling, and word errors may remain.)    Misbah Negrete MD  Internal Medicine Department  [facility]

## 2020-08-31 NOTE — PATIENT INSTRUCTIONS
Labs for leg cramps   Possible future   Requip/Mirapex for leg cramps if recurrence.  Continue with medications1

## 2020-09-01 DIAGNOSIS — R25.2 LEG CRAMPS: ICD-10-CM

## 2020-09-01 DIAGNOSIS — E78.5 HYPERLIPIDEMIA LDL GOAL <130: ICD-10-CM

## 2020-09-01 DIAGNOSIS — R73.9 BLOOD GLUCOSE ELEVATED: ICD-10-CM

## 2020-09-01 LAB
ANION GAP SERPL CALCULATED.3IONS-SCNC: 5 MMOL/L (ref 3–14)
BUN SERPL-MCNC: 10 MG/DL (ref 7–30)
CALCIUM SERPL-MCNC: 9 MG/DL (ref 8.5–10.1)
CHLORIDE SERPL-SCNC: 102 MMOL/L (ref 94–109)
CHOLEST SERPL-MCNC: 215 MG/DL
CO2 SERPL-SCNC: 29 MMOL/L (ref 20–32)
CREAT SERPL-MCNC: 0.7 MG/DL (ref 0.52–1.04)
FERRITIN SERPL-MCNC: 48 NG/ML (ref 8–252)
GFR SERPL CREATININE-BSD FRML MDRD: >90 ML/MIN/{1.73_M2}
GLUCOSE SERPL-MCNC: 89 MG/DL (ref 70–99)
HBA1C MFR BLD: 5.5 % (ref 0–5.6)
HDLC SERPL-MCNC: 69 MG/DL
IRON SATN MFR SERPL: 30 % (ref 15–46)
IRON SERPL-MCNC: 87 UG/DL (ref 35–180)
LDLC SERPL CALC-MCNC: 132 MG/DL
MAGNESIUM SERPL-MCNC: 1.9 MG/DL (ref 1.6–2.3)
NONHDLC SERPL-MCNC: 146 MG/DL
POTASSIUM SERPL-SCNC: 4.3 MMOL/L (ref 3.4–5.3)
SODIUM SERPL-SCNC: 136 MMOL/L (ref 133–144)
TIBC SERPL-MCNC: 293 UG/DL (ref 240–430)
TRIGL SERPL-MCNC: 70 MG/DL

## 2020-09-01 PROCEDURE — 83540 ASSAY OF IRON: CPT | Performed by: INTERNAL MEDICINE

## 2020-09-01 PROCEDURE — 80061 LIPID PANEL: CPT | Performed by: INTERNAL MEDICINE

## 2020-09-01 PROCEDURE — 83550 IRON BINDING TEST: CPT | Performed by: INTERNAL MEDICINE

## 2020-09-01 PROCEDURE — 80048 BASIC METABOLIC PNL TOTAL CA: CPT | Performed by: INTERNAL MEDICINE

## 2020-09-01 PROCEDURE — 83036 HEMOGLOBIN GLYCOSYLATED A1C: CPT | Performed by: INTERNAL MEDICINE

## 2020-09-01 PROCEDURE — 82728 ASSAY OF FERRITIN: CPT | Performed by: INTERNAL MEDICINE

## 2020-09-01 PROCEDURE — 36415 COLL VENOUS BLD VENIPUNCTURE: CPT | Performed by: INTERNAL MEDICINE

## 2020-09-01 PROCEDURE — 83735 ASSAY OF MAGNESIUM: CPT | Performed by: INTERNAL MEDICINE

## 2020-09-02 ENCOUNTER — MYC MEDICAL ADVICE (OUTPATIENT)
Dept: INTERNAL MEDICINE | Facility: CLINIC | Age: 60
End: 2020-09-02

## 2020-09-02 DIAGNOSIS — E78.5 HYPERLIPIDEMIA LDL GOAL <100: Primary | ICD-10-CM

## 2020-09-11 DIAGNOSIS — N95.1 SYMPTOMATIC MENOPAUSAL OR FEMALE CLIMACTERIC STATES: ICD-10-CM

## 2020-09-11 RX ORDER — ESTRADIOL 0.05 MG/D
PATCH, EXTENDED RELEASE TRANSDERMAL
Qty: 8 PATCH | Refills: 1 | Status: SHIPPED | OUTPATIENT
Start: 2020-09-11 | End: 2020-11-13

## 2020-09-16 ENCOUNTER — MYC MEDICAL ADVICE (OUTPATIENT)
Dept: INTERNAL MEDICINE | Facility: CLINIC | Age: 60
End: 2020-09-16

## 2020-09-16 DIAGNOSIS — G25.81 RESTLESS LEG SYNDROME: Primary | ICD-10-CM

## 2020-09-16 RX ORDER — PRAMIPEXOLE DIHYDROCHLORIDE 0.12 MG/1
0.12 TABLET ORAL AT BEDTIME
Qty: 90 TABLET | Refills: 3 | Status: SHIPPED | OUTPATIENT
Start: 2020-09-16 | End: 2021-02-15

## 2020-09-16 NOTE — TELEPHONE ENCOUNTER
PCP please see Tyto message with update on leg pain.     LOV 8/27/2020 regarding this concern.    Amanda ROLANDN, RN, PHN

## 2020-09-17 ENCOUNTER — OFFICE VISIT (OUTPATIENT)
Dept: FAMILY MEDICINE | Facility: CLINIC | Age: 60
End: 2020-09-17
Payer: COMMERCIAL

## 2020-09-17 VITALS
WEIGHT: 123.1 LBS | HEIGHT: 62 IN | HEART RATE: 87 BPM | RESPIRATION RATE: 16 BRPM | BODY MASS INDEX: 22.65 KG/M2 | OXYGEN SATURATION: 97 % | SYSTOLIC BLOOD PRESSURE: 118 MMHG | DIASTOLIC BLOOD PRESSURE: 72 MMHG | TEMPERATURE: 97.8 F

## 2020-09-17 DIAGNOSIS — N89.8 VAGINAL ITCHING: Primary | ICD-10-CM

## 2020-09-17 DIAGNOSIS — B37.31 YEAST VAGINITIS: ICD-10-CM

## 2020-09-17 LAB
SPECIMEN SOURCE: ABNORMAL
WET PREP SPEC: ABNORMAL

## 2020-09-17 PROCEDURE — 99213 OFFICE O/P EST LOW 20 MIN: CPT | Performed by: FAMILY MEDICINE

## 2020-09-17 PROCEDURE — 87210 SMEAR WET MOUNT SALINE/INK: CPT | Performed by: FAMILY MEDICINE

## 2020-09-17 RX ORDER — FLUCONAZOLE 150 MG/1
150 TABLET ORAL ONCE
Qty: 2 TABLET | Refills: 0 | Status: SHIPPED | OUTPATIENT
Start: 2020-09-17 | End: 2020-09-17

## 2020-09-17 ASSESSMENT — MIFFLIN-ST. JEOR: SCORE: 1086.63

## 2020-09-17 NOTE — PROGRESS NOTES
Subjective     Goldie Paul is a 60 year old female who presents to clinic today for the following health issues:    HPI       Vaginal Symptoms  Onset/Duration: x 3 weeks   Description:  Vaginal Discharge: cloudy    Itching (Pruritis): YES  Burning sensation:  YES  Odor: no  Accompanying Signs & Symptoms:  Urinary symptoms: no  Abdominal pain: no  Fever: no  History:   Sexually active: YES  New Partner: no  Possibility of Pregnancy:  no  Recent antibiotic use: no  Previous vaginitis issues: no  Precipitating or alleviating factors: None  Therapies tried and outcome: Monistat- used the first week of the symptoms.  Did seem to be effective at first but still has the itching.-  vagisil    See under ROS     Patient Active Problem List   Diagnosis     Plantar fascial fibromatosis     Allergic rhinitis     Other specified idiopathic peripheral neuropathy     Chronic pain syndrome     Gastroesophageal reflux disease without esophagitis     Fibromyalgia     Insomnia, unspecified insomnia     Seborrheic dermatitis     Hyperlipidemia LDL goal <100     Restless leg syndrome       Current Outpatient Medications   Medication Sig Dispense Refill     cyclobenzaprine (FLEXERIL) 5 MG tablet TAKE ONE OR TWO TABLETS BY MOUTH AT BEDTIME AS NEEDED for back pain or spasms 60 tablet 11     DULoxetine (CYMBALTA) 30 MG capsule Take 1 capsule (30 mg) by mouth 2 times daily 60 capsule 11     estradiol (VIVELLE-DOT) 0.05 MG/24HR bi-weekly patch APPLY ONE PATCH EXTERNALLY to skin TWICE WEEKLY. 8 patch 1     melatonin 3 MG tablet Take 1 tablet (3 mg) by mouth nightly as needed for sleep 100 tablet 3     order for DME Orthotics for both feet 1 Package 2     pantoprazole (PROTONIX) 40 MG EC tablet TAKE 1 TABLET (40 MG) BY MOUTH DAILY 30 tablet 0     pramipexole (MIRAPEX) 0.125 MG tablet Take 1 tablet (0.125 mg) by mouth At Bedtime 90 tablet 3     pregabalin (LYRICA) 75 MG capsule TAKE ONE CAPSULE BY MOUTH THREE TIMES DAILY 90 capsule 5      "ranitidine (ZANTAC) 150 MG tablet Take 1 tablet (150 mg) by mouth 2 times daily 60 tablet 11     traMADol (ULTRAM) 50 MG tablet 1 tab twice a day for pain 60 tablet 2         Review of Systems   CONSTITUTIONAL:NEGATIVE for fever, chills, change in weight  : see below  PSYCHIATRIC: NEGATIVE for changes in mood or affect      Vaginal itching.  Had yeast infection recently. Still with some residual pain and itchiness.    Did use some otc monistat. [place] like it went away. This was several weeks ago.  Does use Vagicel intermittently.        Objective    /72 (BP Location: Right arm, Cuff Size: Adult Regular)   Pulse 87   Temp 97.8  F (36.6  C) (Oral)   Resp 16   Ht 1.575 m (5' 2\")   Wt 55.8 kg (123 lb 1.6 oz)   SpO2 97%   BMI 22.52 kg/m    Body mass index is 22.52 kg/m .  Physical Exam   GENERAL APPEARANCE: alert and no distress  PSYCH: mentation appears normal and affect normal/bright    Results for orders placed or performed in visit on 09/17/20   Wet prep     Status: Abnormal    Specimen: Vagina   Result Value Ref Range    Specimen Description Vagina     Wet Prep No clue cells seen     Wet Prep No Trichomonas seen     Wet Prep Few  Yeast seen   (A)     Wet Prep Few  WBC'S seen              Assessment & Plan     Vaginal itching    - Wet prep  - fluconazole (DIFLUCAN) 150 MG tablet; Take 1 tablet (150 mg) by mouth once for 1 dose Repeat in three days.    Yeast vaginitis  It does show yeast. Will treat as follows.   - fluconazole (DIFLUCAN) 150 MG tablet; Take 1 tablet (150 mg) by mouth once for 1 dose Repeat in three days.           Return in about 4 weeks (around 10/15/2020) for if not improving; earlier if worsening..    Barbra Schulz MD, MD  [facility]    "

## 2020-09-28 DIAGNOSIS — K21.9 GASTROESOPHAGEAL REFLUX DISEASE WITHOUT ESOPHAGITIS: ICD-10-CM

## 2020-09-29 RX ORDER — PANTOPRAZOLE SODIUM 40 MG/1
TABLET, DELAYED RELEASE ORAL
Qty: 30 TABLET | Refills: 10 | Status: SHIPPED | OUTPATIENT
Start: 2020-09-29 | End: 2021-12-03

## 2020-11-06 ENCOUNTER — MYC MEDICAL ADVICE (OUTPATIENT)
Dept: INTERNAL MEDICINE | Facility: CLINIC | Age: 60
End: 2020-11-06

## 2020-11-06 NOTE — TELEPHONE ENCOUNTER
PCP please see mychart message regarding back pain.     No recent visit discussing back pain, from chart review. Please advise if ok with referral for further evaluation versus in clinic assessment    father

## 2020-11-09 NOTE — TELEPHONE ENCOUNTER
Would be best to see pt in clinic to examine and address. Pt to see me today (Monday) at 4:00pm for 40 min appt. Please schedule appt and inform pt

## 2020-11-09 NOTE — TELEPHONE ENCOUNTER
Patient was unable to make today at 4pm, due work schedule and she stated it was too late to call in to find coverage. Patient scheduled for 940am on 11/12/2020.

## 2020-11-12 ENCOUNTER — OFFICE VISIT (OUTPATIENT)
Dept: INTERNAL MEDICINE | Facility: CLINIC | Age: 60
End: 2020-11-12
Payer: COMMERCIAL

## 2020-11-12 VITALS
SYSTOLIC BLOOD PRESSURE: 120 MMHG | BODY MASS INDEX: 22.86 KG/M2 | OXYGEN SATURATION: 97 % | HEART RATE: 92 BPM | RESPIRATION RATE: 16 BRPM | DIASTOLIC BLOOD PRESSURE: 70 MMHG | WEIGHT: 125 LBS | TEMPERATURE: 98 F

## 2020-11-12 DIAGNOSIS — N76.0 ACUTE VAGINITIS: ICD-10-CM

## 2020-11-12 DIAGNOSIS — M79.7 FIBROMYALGIA: ICD-10-CM

## 2020-11-12 DIAGNOSIS — M54.12 CERVICAL RADICULOPATHY: ICD-10-CM

## 2020-11-12 LAB
SPECIMEN SOURCE: NORMAL
WET PREP SPEC: NORMAL

## 2020-11-12 PROCEDURE — 99214 OFFICE O/P EST MOD 30 MIN: CPT | Performed by: INTERNAL MEDICINE

## 2020-11-12 PROCEDURE — 87210 SMEAR WET MOUNT SALINE/INK: CPT | Performed by: INTERNAL MEDICINE

## 2020-11-12 RX ORDER — FLUCONAZOLE 150 MG/1
150 TABLET ORAL ONCE
Qty: 1 TABLET | Refills: 0 | Status: SHIPPED | OUTPATIENT
Start: 2020-11-12 | End: 2020-11-12

## 2020-11-12 RX ORDER — METHYLPREDNISOLONE 4 MG
TABLET, DOSE PACK ORAL
Qty: 21 TABLET | Refills: 0 | Status: SHIPPED | OUTPATIENT
Start: 2020-11-12 | End: 2021-01-26

## 2020-11-12 NOTE — PATIENT INSTRUCTIONS
Medrol steroid dose pack over 6 days as directed   Fluconazole 150mg dose x1. If the vaginal symptoms persist in another week, then let me know and will consider shortterm trial of vaginal estrogen  Heat/stretching of neck in AM  Update me in 1 week via Mychart re: left shoulder blade/neck symptoms.  If resolved, will simply continue usual therapy.  If 50% better, will refer on to physical therapy.  If no improvement at all, will plan increase duloxetine dosage and possibly doing imaging versus therapy  Continue other current medications for now

## 2020-11-12 NOTE — PROGRESS NOTES
"Subjective     Goldie Paul is a 60 year old female who presents to clinic today for the following health issues:    HPI       Chief Complaint   Patient presents with     Back Pain     Patient c/o left side back burning pain near shoulder blade, 3 weeks      Vaginal Problem     Patient c/o vaginal irritation and itching, x 3 days ago. Patient Monistat OTC, no relief       Pt's past medical history, family history, habits, medications and allergies were reviewed with the patient today.  See snap shot for  HCM status. Most recent lab results reviewed with pt. Problem list and histories reviewed & adjusted, as indicated.  Additional history as below:    Pain localizing in the left scapular and trapezial area.  Patient is a history of fibromyalgia but not having increased pain in the other musculature areas of her body.  Denies pain specifically in the left shoulder with movement.  Has pain in the left side of her neck.  No pain radiating into the left upper extremity.  No weakness in the extremities.  Denies cough, shortness of breath. Pt  On longterm Duloxetine,  Lyrica, tramadol and IBF  Also notes some vaginal discharge and itching/irritation symptoms for 3 days.  No improvement so far with some over-the-counter Monistat treatment.  Patient monogamous with her .  No vaginal bleeding or other abdominal pain. Was treated in September with Diflucan for positive wet prep yeast vaginitis  Pt is menopausal. On Estrogen patch already       Additional ROS:   Constitutional, HEENT, Cardiovascular, Pulmonary, GI and , Neuro, MSK and Psych review of systems/symptoms are otherwise negative or unchanged from previous, except as noted above.      OBJECTIVE:  /70   Pulse 92   Temp 98  F (36.7  C) (Temporal)   Resp 16   Wt 56.7 kg (125 lb)   SpO2 97%   BMI 22.86 kg/m     Estimated body mass index is 22.86 kg/m  as calculated from the following:    Height as of 9/17/20: 1.575 m (5' 2\").    Weight as of this " encounter: 56.7 kg (125 lb).  Neck: no adenopathy. Thyroid normal to palpation. No bruits.  Tenderness to palpation left paracervical musculature and to left lateral and flexion movement which is mildly reduced. No nucchal rigidity.  Pulm: Lungs clear to auscultation.  Specifically, no wheezes or rhonchi/crackles with auscultation of left upper posterior lung field  CV: Regular rates and rhythm  GI: Soft, nontender, Normal active bowel sounds   Ext: Peripheral pulses intact. No edema.  Neuro: Normal strength and tone, sensory exam grossly normal.  Global mild increase sensitivity to palpation of the fibromyalgia trigger points   MSK: Increased tenderness palpation left trapezial musculature and scapular musculature.  No tenderness to abduction left shoulder or to internal/external rotation of the left shoulder.  Neck exam as above   GYN normal-appearing external female genitalia.  Speculum exam of the vaginal canal shows some whitish material coating parts of the vaginal wall.  Difficult to discriminate possible Monistat medication versus other. No ulcerations.  No obvious erythema    Assessment/Plan: (See plan discussion below for further details)  1. Cervical radiculopathy  Symptoms most consistent with radiating radicular pain from the neck.  Will treat with Medrol Dosepak.  Continue Lyrica and duloxetine.  Possible physical therapy versus imaging in future depending on response to oral steroid  - methylPREDNISolone (MEDROL DOSEPAK) 4 MG tablet therapy pack; Follow Package Directions  Dispense: 21 tablet; Refill: 0    2. Fibromyalgia  Overall stable.  Continue tramadol, lyrica, duloxetine, cyclobenzaprine    3. Acute vaginitis  Previous history of yeast vaginitis per wet prep in September.  Similar symptoms.  Wet prep negative for yeast now though has been using some Monistat that may cause false negative result.  We will therefore treat with oral fluconazole.  If not improving with this therapy, possible future  trial of vaginal estrogen to add to current estrogen patch therapy to use  - Wet prep  - fluconazole (DIFLUCAN) 150 MG tablet; Take 1 tablet (150 mg) by mouth once for 1 dose  Dispense: 1 tablet; Refill: 0    Plan discussion:  Medrol steroid dose pack over 6 days as directed   Fluconazole 150mg dose x1. If the vaginal symptoms persist in another week, then let me know and will consider shortterm trial of vaginal estrogen  Heat/stretching of neck in AM  Update me in 1 week via Mychart re: left shoulder blade/neck symptoms.  If resolved, will simply continue usual therapy.  If 50% better, will refer on to physical therapy.  If no improvement at all, will plan increase duloxetine dosage and possibly doing imaging versus therapy  Continue other current medications for now       Misbah Negrete MD  Internal Medicine Department  [facility]    (Chart documentation was completed, in part, with [employer] voice-recognition software. Even though reviewed, some grammatical, spelling, and word errors may remain.)

## 2020-11-13 DIAGNOSIS — N95.1 SYMPTOMATIC MENOPAUSAL OR FEMALE CLIMACTERIC STATES: ICD-10-CM

## 2020-11-13 DIAGNOSIS — M79.7 FIBROMYALGIA: ICD-10-CM

## 2020-11-13 RX ORDER — ESTRADIOL 0.05 MG/D
PATCH, EXTENDED RELEASE TRANSDERMAL
Qty: 8 PATCH | Refills: 1 | Status: SHIPPED | OUTPATIENT
Start: 2020-11-13 | End: 2021-01-26

## 2020-11-13 RX ORDER — DULOXETIN HYDROCHLORIDE 30 MG/1
CAPSULE, DELAYED RELEASE ORAL
Qty: 60 CAPSULE | Refills: 5 | Status: SHIPPED | OUTPATIENT
Start: 2020-11-13 | End: 2021-06-20

## 2020-11-13 NOTE — TELEPHONE ENCOUNTER
Prescription approved per [facility] Refill Protocol.    Amanda ROLANDN, RN, PHN

## 2020-12-29 ENCOUNTER — TRANSFERRED RECORDS (OUTPATIENT)
Dept: HEALTH INFORMATION MANAGEMENT | Facility: CLINIC | Age: 60
End: 2020-12-29

## 2020-12-30 ENCOUNTER — TRANSFERRED RECORDS (OUTPATIENT)
Dept: HEALTH INFORMATION MANAGEMENT | Facility: CLINIC | Age: 60
End: 2020-12-30

## 2021-01-15 ENCOUNTER — HEALTH MAINTENANCE LETTER (OUTPATIENT)
Age: 61
End: 2021-01-15

## 2021-01-24 DIAGNOSIS — M79.7 FIBROMYALGIA: ICD-10-CM

## 2021-01-24 DIAGNOSIS — N95.1 SYMPTOMATIC MENOPAUSAL OR FEMALE CLIMACTERIC STATES: ICD-10-CM

## 2021-01-25 ENCOUNTER — OFFICE VISIT (OUTPATIENT)
Dept: FAMILY MEDICINE | Facility: CLINIC | Age: 61
End: 2021-01-25
Payer: COMMERCIAL

## 2021-01-25 VITALS
RESPIRATION RATE: 16 BRPM | HEIGHT: 63 IN | HEART RATE: 90 BPM | SYSTOLIC BLOOD PRESSURE: 120 MMHG | DIASTOLIC BLOOD PRESSURE: 72 MMHG | TEMPERATURE: 98.2 F | OXYGEN SATURATION: 99 % | WEIGHT: 129.1 LBS | BODY MASS INDEX: 22.88 KG/M2

## 2021-01-25 DIAGNOSIS — Z12.11 SPECIAL SCREENING FOR MALIGNANT NEOPLASMS, COLON: ICD-10-CM

## 2021-01-25 DIAGNOSIS — N89.8 VAGINAL IRRITATION: Primary | ICD-10-CM

## 2021-01-25 LAB
SPECIMEN SOURCE: NORMAL
WET PREP SPEC: NORMAL

## 2021-01-25 PROCEDURE — 87210 SMEAR WET MOUNT SALINE/INK: CPT | Performed by: PHYSICIAN ASSISTANT

## 2021-01-25 PROCEDURE — 99213 OFFICE O/P EST LOW 20 MIN: CPT | Performed by: PHYSICIAN ASSISTANT

## 2021-01-25 ASSESSMENT — MIFFLIN-ST. JEOR: SCORE: 1121.78

## 2021-01-25 NOTE — PROGRESS NOTES
Assessment & Plan     Vaginal irritation  Ongoing vaginal irritation/burning/itching for past few months. Does have some pain with intercourse occasionally. She is post menopausal and uses estrogen patch. Wet prep negative today. Plan to follow-up with OB/GYN for further evaluation, patient agrees. Risks and benefits of treatment plan discussed. Patient and/or parent acknowledges and agrees with plan of care, all questions answered.    - OB/GYN REFERRAL  - Wet prep    Special screening for malignant neoplasms, colon  - GASTROENTEROLOGY ADULT REF PROCEDURE ONLY; Future    Return in about 1 week (around 2/1/2021) for OB/GYN.    YA Vidal [facility] HARMAN Amezcua is a 60 year old who presents to clinic today for the following health issues     HPI       Vaginal Symptoms  Onset/Duration: ongoing  Description:  Vaginal Discharge: none   Itching (Pruritis): YES  Burning sensation:  YES  Odor: no  Accompanying Signs & Symptoms:  Urinary symptoms: Maybe slight stinging occasionally with urination but no true dysuria or other urinary symptoms. does have history of prolapsed bladder.  Abdominal pain: no  Fever: no  History:   Sexually active: YES - monogamous with , no concern for STI  New Partner: no  Possibility of Pregnancy:  no  Recent antibiotic use: no  Previous vaginitis issues: YES  Precipitating or alleviating factors: None  Therapies tried and outcome: Diflucan and Monistat- used for previous vaginitis     Yeast infection in 9/2020, treated with diflucan. Ongoing vaginal irritation 11/2020, wet prep was negative at that time but had been taking monistat so was treated again with diflucan. Symptoms did improve after taking fluconazole but the vaginal irritation has never fully resolved.     Reports ongoing vaginal irritation and burning, seems pretty persistent. Maybe a little burning initially with urination occasionally but no other urinary symptoms. Drinking  "lots of fluids. No vaginal bleeding, abdominal pain, fever. No abnormal vaginal odor or discharge. Some mild pain with intercourse. Does use vagisil occasionally when irritation is more bothersome and this does seem to help. She has not used monistat recently.    She is menopausal. Taking estrogen patch. Hysterectomy in 1992.    Review of Systems   Constitutional, HEENT, cardiovascular, pulmonary, gi and gu systems are negative, except as otherwise noted.      Objective    /72 (BP Location: Right arm, Cuff Size: Adult Regular)   Pulse 90   Temp 98.2  F (36.8  C) (Oral)   Resp 16   Ht 1.588 m (5' 2.5\")   Wt 58.6 kg (129 lb 1.6 oz)   SpO2 99%   BMI 23.24 kg/m    Body mass index is 23.24 kg/m .  Physical Exam   GENERAL: healthy, alert and no distress  RESP: lungs clear to auscultation - no rales, rhonchi or wheezes  CV: regular rate and rhythm, normal S1 S2, no S3 or S4, no murmur, click or rub, no peripheral edema and peripheral pulses strong  ABDOMEN: soft, nontender, no hepatosplenomegaly, no masses and bowel sounds normal   (female): normal female external genitalia, normal urethral meatus, normal post-hysterectomy exam without masses, speculum exam shows no discharge, ulcerations, erythema.  NEURO: Normal strength and tone, mentation intact and speech normal  PSYCH: mentation appears normal, affect normal/bright    Results for orders placed or performed in visit on 01/25/21 (from the past 24 hour(s))   Wet prep    Specimen: Vagina   Result Value Ref Range    Specimen Description Vagina     Wet Prep No yeast seen     Wet Prep No clue cells seen     Wet Prep No Trichomonas seen     Wet Prep Moderate  WBC'S seen          "

## 2021-01-26 RX ORDER — ESTRADIOL 0.05 MG/D
PATCH, EXTENDED RELEASE TRANSDERMAL
Qty: 8 PATCH | Refills: 1 | Status: SHIPPED | OUTPATIENT
Start: 2021-01-26 | End: 2021-10-05

## 2021-01-26 RX ORDER — PREGABALIN 75 MG/1
CAPSULE ORAL
Qty: 90 CAPSULE | Refills: 5 | Status: SHIPPED | OUTPATIENT
Start: 2021-01-26 | End: 2021-06-20

## 2021-01-26 NOTE — TELEPHONE ENCOUNTER
Estradiol    Prescription approved per [facility] Refill Protocol.    Amanda ROLANDN, RN, PHN

## 2021-01-26 NOTE — TELEPHONE ENCOUNTER
Timing of RF appropriate.  UTD re: clinic visit/evisit   [place]  site reviewed. Rx electronically sent to pharmacy

## 2021-01-26 NOTE — TELEPHONE ENCOUNTER
Lyrica    Last Written Prescription Date:  5/2/2020  Last Fill Quantity: 90,  # refills: 0   Last office visit: 11/12/2020 with prescribing provider:  Dr. Negrete   Future Office Visit:   Next 5 appointments (look out 90 days)    Feb 15, 2021  9:00 AM  Office Visit with Adri Hernandez MD  [facility] [address] [place] [id number] [phone]         Routing refill request to provider for review/approval because:  Drug not on the [facility] refill protocol

## 2021-02-15 ENCOUNTER — OFFICE VISIT (OUTPATIENT)
Dept: OBGYN | Facility: CLINIC | Age: 61
End: 2021-02-15
Payer: COMMERCIAL

## 2021-02-15 VITALS — WEIGHT: 128.4 LBS | DIASTOLIC BLOOD PRESSURE: 68 MMHG | BODY MASS INDEX: 23.11 KG/M2 | SYSTOLIC BLOOD PRESSURE: 110 MMHG

## 2021-02-15 DIAGNOSIS — N95.2 VAGINAL ATROPHY: ICD-10-CM

## 2021-02-15 DIAGNOSIS — N39.3 SUI (STRESS URINARY INCONTINENCE, FEMALE): ICD-10-CM

## 2021-02-15 DIAGNOSIS — N90.89 VULVAR IRRITATION: Primary | ICD-10-CM

## 2021-02-15 DIAGNOSIS — N95.1 SYMPTOMATIC MENOPAUSAL OR FEMALE CLIMACTERIC STATES: ICD-10-CM

## 2021-02-15 DIAGNOSIS — N81.11 CYSTOCELE, MIDLINE: ICD-10-CM

## 2021-02-15 LAB
SPECIMEN SOURCE: NORMAL
WET PREP SPEC: NORMAL

## 2021-02-15 PROCEDURE — 87210 SMEAR WET MOUNT SALINE/INK: CPT | Performed by: OBSTETRICS & GYNECOLOGY

## 2021-02-15 PROCEDURE — 99204 OFFICE O/P NEW MOD 45 MIN: CPT | Performed by: OBSTETRICS & GYNECOLOGY

## 2021-02-15 PROCEDURE — 87102 FUNGUS ISOLATION CULTURE: CPT | Performed by: OBSTETRICS & GYNECOLOGY

## 2021-02-15 RX ORDER — ESTRADIOL 0.07 MG/D
1 PATCH TRANSDERMAL WEEKLY
Qty: 12 PATCH | Refills: 3 | Status: SHIPPED | OUTPATIENT
Start: 2021-02-15 | End: 2021-12-03

## 2021-02-15 RX ORDER — ESTRADIOL 0.1 MG/G
1 CREAM VAGINAL
Qty: 42.5 G | Refills: 2 | Status: SHIPPED | OUTPATIENT
Start: 2021-02-15 | End: 2024-01-23

## 2021-02-15 NOTE — PATIENT INSTRUCTIONS
Return to clinic in 1 month after trying vaginal estrogen cream and vulvar hygiene methods listed below. Will plan pessary fitting at that time for stress urinary incontinence and cystocele.     Vaginal Atrophy    Vaginal atrophy is a normal part of the aging process and is a reaction to decreased circulating estrogen, but this doesn't mean that there is nothing we can do about it. Symptoms often include vaginal dryness, itching, pain with intercourse, and narrowing of the vagina.     For dryness you can start by trying Replens, or another over the counter vaginal moisturizer.    Try a silicone or oil based lubricant for intercourse to make it more comfortable. If you want to talk with an expert on lubricants try visiting the [facility] on [facility] in [facility] where they have a large variety of lubricants to try.   You can purchase lubricants at www.Language123 or amazon.com if they are hard to find elsewhere.    Read about sexual issues after menopause at [email or web address].eCullet    Vaginal estrogen is a medication inserted into the vagina which can decrease dryness, itching, narrowing, and pain with intercourse. It is administered in very low doses and is locally active. The risks associated with vaginal estrogen are far fewer and less significant than those associated with systemic hormone replacement therapy.  You may need to contact your insurance company to determine which vaginal estrogen formulation is covered at the best rate. This is different for each insurance company and for each type of supplemental coverage. Options include the following:    - Vagifem tablet, inserted vaginally daily for 14 days then 2-3x weekly  - Estring, a ring placed inside the vagina that rests at the top of the vagina for 3 months  - Estrace cream, a cream that is applied daily for 14 days then 2x/week inside the vagina. Apply 0.5g.  - Premarin cream, a cream that is applied daily for 14 days then 2x/week inside  the vagina. Apply 0.5g      [facility] can be a great resource for finding ways to cope with changing sexual desire and satisfaction.   - Address: [address] [place] [id number]     - Phone: [phone]       Some Suggested Vulvar Pain & Itching Measures    The vulva is the external genitalia in the female.  The skin of the vulva can be quite sensitive.  Because it is moist and frequently subjected to friction while sitting and moving, this area can be easily injured.  There are various strategies that can be used to prevent irritation and allow the vulva to heal.  Keeping this area dry can accelerate healing.  Chemicals found in toilet tissues, laundry soaps and detergents that come in contact with the vulva can cause irritation.  Avoiding contact with potential irritants that contain chemicals is important.  Fabric softeners in undergarments, chemicals in deodorant soaps, bubble baths, feminine hygiene spray and panty liners, etc., can all cause irritation to the vulva.  The following recommendations are specific measures that can help minimize vulvar irritation.    Wear white 100% cotton underwear and do not wear underwear at night.  Do not wear pantyhose, tights, or other close-fitting clothes.  Enclosing this area with synthetic fibers holds both heat and moisture in the skin, conditions which potentiate the development of infections.  Tight-fitting clothes may also increase your symptoms of discomfort.    After washing underwear, put it through at least one whole cycle with water only.  Some women have suffered needlessly from irritants in detergents whose residue was left in clothes by incomplete rinsing.  Rinsing clothes thoroughly is more important than which detergent is used although to be on the safe side, the milder the soap, the better.  Wash new underwear before wearing.  Fabric softeners and dryer sheets should not be used.    Soaking in the tub or a sitz bath twice  daily for 10-15 minutes can help. After soaking, pat dry the vulva and apply vaseline to the entire outer area.     Rinse skin off with plain water frequently.  Use tap water, distilled water, sitz baths, squirt bottles, or bidets.  Additionally, hand held showers can be helpful for rinsing the vulva.  A wet warm washcloth can be held against the vulva to soak if a bath is unavailable.     Use very mild soap for bathing.  It is best not to use any soaps on the vulva.  The vulva should be rinsed with warm water.  Bars of soap such as Neutrogena unscented face soap, Dove, Basis, Pears (made in [place]), and castile soap with olive oil (Favian) are gentle to the other skin areas.  They are found at pharmacies or health food stores.  Remember that frequent baths with soaps may increase the irritation.  You cannot wash away your symptoms.    Some patients find the use of cool gel packs on the vulva to be helpful.    Do not use products with benzocaine.    Consider using 100% cotton menstrual pads and tampons.  Many women with vulvar pain experience a significant increase in irritation and pain every month when they use commercial paper pads or tampons.  This monthly increase in pain can often be reduced by using 100% washable and reusable cotton menstrual pads.  Pure cotton tampons are available. Or try a menstrual cup.    Don't sit or remain in a wet bathing suit for prolonged periods. Change underwear shortly after exercising as well.

## 2021-02-15 NOTE — PROGRESS NOTES
SUBJECTIVE:   CC: vulvovaginal irritation                                               Goldie Paul is a 60 year old  female who presents to clinic today for evaluation of vulvovaginal irritation in the past 6-9 months, new onset dyspareunia at that time. Has not tried personal lubricant. Some sandpapery irritation with intercourse, also deep dyspareunia.  On vivelle dot 0.05mg for HRT. Occasional night sweats on this dose, masks seem to make hot flashes a bit worse. Interested in going back up on her dose to improve vasomotor symptoms.  + dypareunia  Wet prep x3 in the past 6 months, initial + for yeast, otherwise all negative  Wearing panty liners most of the time due to stress urinary incontinence, occurs with every episode of cough/laugh/sneeze. No previous evaluation.  Feels that she does not completely empty her bladder due to cystocele, no prior evaluation. Occasionally has discomfort with bulging sensation, but not overly bothersome.     PGYNH:   menarche age 13 y.o.; + history of abnormal paps (ASCUS neg HPV ), denies history of STDs; + history of endometriosis (TSH/BSO )    Problem list and histories reviewed & adjusted, as indicated.  Additional history: as documented.      Patient Active Problem List   Diagnosis     Plantar fascial fibromatosis     Allergic rhinitis     Other specified idiopathic peripheral neuropathy     Chronic pain syndrome     Gastroesophageal reflux disease without esophagitis     Fibromyalgia     Insomnia, unspecified insomnia     Seborrheic dermatitis     Hyperlipidemia LDL goal <100     Restless leg syndrome     Cervical radiculopathy     Past Surgical History:   Procedure Laterality Date     ABDOMEN SURGERY      hysterectomy     APPENDECTOMY       [facility] NONSPECIFIC PROCEDURE  2004    Plantar fasciotomy     [facility] NONSPECIFIC PROCEDURE      DAVID, BSO for endometriosis      Social History     Tobacco Use     Smoking status: Never Smoker     Smokeless  tobacco: Never Used   Substance Use Topics     Alcohol use: No     Alcohol/week: 0.0 standard drinks      Problem (# of Occurrences) Relation (Name,Age of Onset)    Breast Cancer (1) Sister: dx age 44    Cardiovascular (2) Mother, Father: stents    Diabetes (3) Mother, Father, Brother    Hypertension (3) Mother, Father, Brother    Parkinsonism (1) Father                 cyclobenzaprine (FLEXERIL) 5 MG tablet, TAKE ONE OR TWO TABLETS BY MOUTH AT BEDTIME AS NEEDED for back pain or spasms       DULoxetine (CYMBALTA) 30 MG capsule, Take 1 capsule by mouth 2 times daily.       estradiol (VIVELLE-DOT) 0.05 MG/24HR bi-weekly patch, APPLY 1 PATCH  EXTERNALLY TO CLEAN, DRY, HAIRLESS SKIN BY TRANSDERMAL ROUTE TWICE WEEKLY. REMOVE OLD PATCH BEFORE APPLYING A NEW ONE.       melatonin 3 MG tablet, Take 1 tablet (3 mg) by mouth nightly as needed for sleep       order for DME, Orthotics for both feet       pantoprazole (PROTONIX) 40 MG EC tablet, TAKE 1 TABLET (40 MG) BY MOUTH once DAILY       pregabalin (LYRICA) 75 MG capsule, TAKE ONE CAPSULE BY MOUTH THREE TIMES DAILY       traMADol (ULTRAM) 50 MG tablet, 1 tab twice a day for pain    No current facility-administered medications on file prior to visit.     Allergies   Allergen Reactions     Amoxicillin Rash     Unsure if has allergy to this       OBJECTIVE:   /68   Wt 58.2 kg (128 lb 6.4 oz)   BMI 23.11 kg/m     Const: sitting in chair in no acute distress, comfortable  Eyes: no scleral icterus, EOMI  CV: regular rate, well perfused  Pulm: no increased work of breathing, no cough  Skin: warm and dry, no rashes/lesions  Psych: mood stable, appropriate affect  Abd: soft, no hepatosplenomegaly, non-tender to palpation  Neuro: A+Ox3   : External genitalia normal architecture, mild atrophy. No obvious excoriations, lesions, or rashes. Bartholins, urethra, normal.  Pale vaginal mucosa.  SSE: Normal vaginal cuff, surgically absent cervix, scant thick white discharge. On  single blade speculum exam the bladder descends to the introitus with valsalva. No incontinence noted on exam with valsalva.     ASSESSMENT/PLAN:                                                    Goldie Paul is a 60 year old female  here for multiple  complaints:    1. Symptomatic menopausal or female climacteric states  - will increase dose to original strength due to increased symptoms since decreasing  - estradiol (CLIMARA) 0.075 MG/24HR weekly patch; Place 1 patch onto the skin once a week  Dispense: 12 patch; Refill: 3    2. Vulvar irritation  - suspect contact dermatitis from frequent panty liner use, likely compounded by vaginal atrophy. Reviewed vulvar hygiene and barrier protection with vaseline.     3. Vaginal atrophy  - estradiol (ESTRACE) 0.1 MG/GM vaginal cream; Place 1 g vaginally twice a week Use daily for the first 14 days  Dispense: 42.5 g; Refill: 2    4. Cystocele, midline  Options discussed, including surgical correction (cystocele repair vs sacrocolpopexy) versus pessary fitting versus watchful waiting +/- pelvic floor physical therapy. She is advised that this is not dangerous, and that she can keep an eye on things and wait for any treatment until when or if she needs it. She knows she will need to come back in if her symptoms change enough to be bothersome, if she develops any urinary retention.    She is interested in trying a pessary and will return to clinic for a fitting.    5. DEBORAH (stress urinary incontinence, female)  - reviewed management options including pelvic floor physical therapy, pessary with urethra support notch, and TVT procedure.      6. Dyspareunia  - suspect contribution from vaginal atrophy/irritation as well as possibly from cystocele. Will start with vaginal estrogen and recommended lubricant during intercourse.    Return to clinic in the next month for f/u of vaginal estrogen start and pessary fitting.         Adri Hernandez MD  Obstetrics and  Gynecology   [facility] [place]

## 2021-02-15 NOTE — NURSING NOTE
"Chief Complaint   Patient presents with     Consult     for vaginal itching, burning, irritation, pain with intercourse. Patient was seen on 21- with negative wet prep. Patient has self treated with yeast medication and been battling sx's since October. Some pain with urination, but patient has prolapsed bladder and says that is ongoing.       Initial /68   Wt 58.2 kg (128 lb 6.4 oz)   BMI 23.11 kg/m   Estimated body mass index is 23.11 kg/m  as calculated from the following:    Height as of 21: 1.588 m (5' 2.5\").    Weight as of this encounter: 58.2 kg (128 lb 6.4 oz).  BP completed using cuff size: regular    Questioned patient about current smoking habits.  Pt. has never smoked.          The following HM Due: NONE    Grady Chavez CMA             "

## 2021-02-19 LAB
Lab: NORMAL
SPECIMEN SOURCE: NORMAL
YEAST SPEC QL CULT: NORMAL

## 2021-06-08 ENCOUNTER — MYC MEDICAL ADVICE (OUTPATIENT)
Dept: INTERNAL MEDICINE | Facility: CLINIC | Age: 61
End: 2021-06-08

## 2021-06-09 NOTE — TELEPHONE ENCOUNTER
PCP please see mychart message regarding GI symptoms not improving with use of current PPI regimen. Triage encouraged in clinic visit for further evaluation.     Would you  like to place a referral for future EGD, looks as if another provider may have back in January?

## 2021-06-17 ENCOUNTER — OFFICE VISIT (OUTPATIENT)
Dept: INTERNAL MEDICINE | Facility: CLINIC | Age: 61
End: 2021-06-17
Payer: COMMERCIAL

## 2021-06-17 VITALS
WEIGHT: 133 LBS | HEART RATE: 99 BPM | OXYGEN SATURATION: 98 % | BODY MASS INDEX: 23.94 KG/M2 | RESPIRATION RATE: 16 BRPM | TEMPERATURE: 97.1 F | SYSTOLIC BLOOD PRESSURE: 128 MMHG | DIASTOLIC BLOOD PRESSURE: 70 MMHG

## 2021-06-17 DIAGNOSIS — G89.29 CHRONIC PAIN OF LEFT KNEE: ICD-10-CM

## 2021-06-17 DIAGNOSIS — M25.562 CHRONIC PAIN OF LEFT KNEE: ICD-10-CM

## 2021-06-17 DIAGNOSIS — M79.7 FIBROMYALGIA: ICD-10-CM

## 2021-06-17 DIAGNOSIS — M72.2 PLANTAR FASCIAL FIBROMATOSIS: ICD-10-CM

## 2021-06-17 DIAGNOSIS — R10.84 ABDOMINAL PAIN, GENERALIZED: Primary | ICD-10-CM

## 2021-06-17 LAB
ERYTHROCYTE [DISTWIDTH] IN BLOOD BY AUTOMATED COUNT: 13.6 % (ref 10–15)
HCT VFR BLD AUTO: 41.1 % (ref 35–47)
HGB BLD-MCNC: 13.8 G/DL (ref 11.7–15.7)
MCH RBC QN AUTO: 30.1 PG (ref 26.5–33)
MCHC RBC AUTO-ENTMCNC: 33.6 G/DL (ref 31.5–36.5)
MCV RBC AUTO: 90 FL (ref 78–100)
PLATELET # BLD AUTO: 261 10E9/L (ref 150–450)
RBC # BLD AUTO: 4.59 10E12/L (ref 3.8–5.2)
WBC # BLD AUTO: 7.1 10E9/L (ref 4–11)

## 2021-06-17 PROCEDURE — 36415 COLL VENOUS BLD VENIPUNCTURE: CPT | Performed by: INTERNAL MEDICINE

## 2021-06-17 PROCEDURE — 85027 COMPLETE CBC AUTOMATED: CPT | Performed by: INTERNAL MEDICINE

## 2021-06-17 PROCEDURE — 83516 IMMUNOASSAY NONANTIBODY: CPT | Performed by: INTERNAL MEDICINE

## 2021-06-17 PROCEDURE — 83516 IMMUNOASSAY NONANTIBODY: CPT | Mod: 59 | Performed by: INTERNAL MEDICINE

## 2021-06-17 PROCEDURE — 80053 COMPREHEN METABOLIC PANEL: CPT | Performed by: INTERNAL MEDICINE

## 2021-06-17 PROCEDURE — 99214 OFFICE O/P EST MOD 30 MIN: CPT | Performed by: INTERNAL MEDICINE

## 2021-06-17 ASSESSMENT — ANXIETY QUESTIONNAIRES
6. BECOMING EASILY ANNOYED OR IRRITABLE: NOT AT ALL
3. WORRYING TOO MUCH ABOUT DIFFERENT THINGS: NOT AT ALL
5. BEING SO RESTLESS THAT IT IS HARD TO SIT STILL: NOT AT ALL
GAD7 TOTAL SCORE: 0
2. NOT BEING ABLE TO STOP OR CONTROL WORRYING: NOT AT ALL
IF YOU CHECKED OFF ANY PROBLEMS ON THIS QUESTIONNAIRE, HOW DIFFICULT HAVE THESE PROBLEMS MADE IT FOR YOU TO DO YOUR WORK, TAKE CARE OF THINGS AT HOME, OR GET ALONG WITH OTHER PEOPLE: NOT DIFFICULT AT ALL
1. FEELING NERVOUS, ANXIOUS, OR ON EDGE: NOT AT ALL
7. FEELING AFRAID AS IF SOMETHING AWFUL MIGHT HAPPEN: NOT AT ALL

## 2021-06-17 ASSESSMENT — PATIENT HEALTH QUESTIONNAIRE - PHQ9
SUM OF ALL RESPONSES TO PHQ QUESTIONS 1-9: 2
5. POOR APPETITE OR OVEREATING: NOT AT ALL

## 2021-06-17 NOTE — PROGRESS NOTES
ASSESSMENT:   1. Abdominal pain, generalized  Probable IBS.  Patient states she does have a history of some lactose intolerance but symptoms persist despite avoiding lactose foods so question whether there may be deficiency of other enzymatic enzymes such as to fructose, sucrose, etc.  Labs as ordered.  Patient to see GI in addition  - GASTROENTEROLOGY ADULT REF CONSULT ONLY; Future  - Tissue transglutaminase matias IgA and IgG  - Comprehensive metabolic panel  - CBC with platelets    2. Fibromyalgia     Fairly stable overall though occasional mild flares.  Continue duloxetine (with occ extra dose) and Lyrica  - DULoxetine (CYMBALTA) 30 MG capsule; Take 1 capsule (30 mg) by mouth 2 times daily Plus occasional extra dose in AM for periods of increase pain  Dispense: 225 capsule; Refill: 3  - pregabalin (LYRICA) 75 MG capsule; TAKE ONE CAPSULE BY MOUTH THREE TIMES DAILY  Dispense: 270 capsule; Refill: 1    3. Plantar fascial fibromatosis  Continue good arch supportive management per chiropractor or patient to see [facility]    4. Chronic pain of left knee  Continue management per Ortho and chiropractor      PLAN:  Labs as ordered  Continue current medication including duloxetine and Lyrica for pain management with occasional extra 30 mg dose of duloxetine in the morning during periods of increased pain (so total daily dose would be 90 mg)  Referral to [place]  [facility] regarding abdominal issues and possible irritable bowel syndrome.  Call [phone] to schedule consultation appointment  Continue knee management per chiropractor and Ortho  Continue current plantar fasciitis management.  If symptoms worsen, contact me for referral to [facility]      (Chart documentation was completed, in part, with [employer] voice-recognition software. Even though reviewed, some grammatical, spelling, and word errors may remain.)    Misbah Negrete MD  Internal Medicine Department  [facility]  "RUY Amezcua is a 60 year old who presents for the following health issues     HPI   Chief Complaint   Patient presents with     Follow Up     for GI Pain-Patient states more Gassy and Bloating-Patient uses GAS-X and TUMS     Pain     Managament      Chronic Pain Follow-Up    Where in your body do you have pain? Fibromyalgia   How has your pain affected your ability to work? 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 Chiropractor and Massage  How well are you sleeping? Fair  How has your mood been since your last visit? About the same  Have you had a significant life event? No  Other aggravating factors: Standing, Walking at times  Taking medication as directed? Yes    PHQ 12/5/2019 5/1/2020 6/17/2021   PHQ-9 Total Score 1 1 2   Q9: Thoughts of better off dead/self-harm past 2 weeks Not at all Not at all Not at all     CHALO-7 SCORE 12/5/2019 5/1/2020 6/17/2021   Total Score 0 0 0     Encounter-Level CSA - 09/12/2014:    Controlled Substance Agreement - Scan on 9/15/2014 10:31 AM: Controlled Substance  Agreement 9/12/14     Patient-Level CSA:    There are no patient-level csa.           Most recent lab results reviewed with pt.       Occ diarrhea and sometime normal   Gassiness  In abdomen and occ bloating  No obvious foods causing   More GERD issues despite Pantoprazole daily. Worsened with less dosing  Tried pepcid but still sx  No blood seen in stools but occ darker  Abd feels better today  HP neg  2017   Stools loose every other day. Occ multiple stools/day. Sometimes more formed and normal in appearance   Mood \"OK\" overall but frustrated   Occ hunger pains  Chronic left knee pain.  Working with chiropractor and using supplements and knee sleeve after previously seeing [facility] and being told had moderate patella degenerative disease.  Pain mostly going up and down stairs.  Chronic fibromyalgia pain about the " "same.  Rarely using an extra dose of duloxetine or Lyrica to help with pain control with improvement  Also has some chronic plantar fasciitis symptoms despite use of good shoes and stretching.  Working with chiropractor further about this also       Additional ROS:   Constitutional, HEENT, Cardiovascular, Pulmonary, GI and , Neuro, MSK and Psych review of systems/symptoms are otherwise negative or unchanged from previous, except as noted above.      OBJECTIVE:  /70   Pulse 99   Temp 97.1  F (36.2  C) (Temporal)   Resp 16   Wt 60.3 kg (133 lb)   SpO2 98%   BMI 23.94 kg/m     Estimated body mass index is 23.94 kg/m  as calculated from the following:    Height as of 1/25/21: 1.588 m (5' 2.5\").    Weight as of this encounter: 60.3 kg (133 lb).     Neck: no adenopathy. Thyroid normal to palpation. No bruits  Pulm: Lungs clear to auscultation   CV: Regular rates and rhythm  GI: Soft, nontender, Normal active bowel sounds, No hepatosplenomegaly or masses palpable  Ext: Peripheral pulses intact. No edema.  Mild tenderness palpation bilateral patellofemoral ligaments.  No effusions.  High arches of the feet bilaterally with minimal tenderness along the arches to palpation.    Neuro: Normal strength and tone, sensory exam grossly normal to light touch sensation.  Global hypersensitivity to fibromyalgia trigger point palpation  Rectal: Guaiac negative brown stool         "

## 2021-06-18 LAB
ALBUMIN SERPL-MCNC: 3.9 G/DL (ref 3.4–5)
ALP SERPL-CCNC: 118 U/L (ref 40–150)
ALT SERPL W P-5'-P-CCNC: 50 U/L (ref 0–50)
ANION GAP SERPL CALCULATED.3IONS-SCNC: 2 MMOL/L (ref 3–14)
AST SERPL W P-5'-P-CCNC: 22 U/L (ref 0–45)
BILIRUB SERPL-MCNC: 0.2 MG/DL (ref 0.2–1.3)
BUN SERPL-MCNC: 13 MG/DL (ref 7–30)
CALCIUM SERPL-MCNC: 9.2 MG/DL (ref 8.5–10.1)
CHLORIDE SERPL-SCNC: 103 MMOL/L (ref 94–109)
CO2 SERPL-SCNC: 30 MMOL/L (ref 20–32)
CREAT SERPL-MCNC: 0.64 MG/DL (ref 0.52–1.04)
GFR SERPL CREATININE-BSD FRML MDRD: >90 ML/MIN/{1.73_M2}
GLUCOSE SERPL-MCNC: 89 MG/DL (ref 70–99)
POTASSIUM SERPL-SCNC: 4.2 MMOL/L (ref 3.4–5.3)
PROT SERPL-MCNC: 7.6 G/DL (ref 6.8–8.8)
SODIUM SERPL-SCNC: 135 MMOL/L (ref 133–144)

## 2021-06-18 ASSESSMENT — ANXIETY QUESTIONNAIRES: GAD7 TOTAL SCORE: 0

## 2021-06-20 RX ORDER — DULOXETIN HYDROCHLORIDE 30 MG/1
30 CAPSULE, DELAYED RELEASE ORAL 2 TIMES DAILY
Qty: 225 CAPSULE | Refills: 3 | Status: SHIPPED | OUTPATIENT
Start: 2021-06-20 | End: 2021-06-23

## 2021-06-20 RX ORDER — PREGABALIN 75 MG/1
CAPSULE ORAL
Qty: 270 CAPSULE | Refills: 1 | Status: SHIPPED | OUTPATIENT
Start: 2021-06-20 | End: 2022-04-10

## 2021-06-21 ENCOUNTER — TELEPHONE (OUTPATIENT)
Dept: INTERNAL MEDICINE | Facility: CLINIC | Age: 61
End: 2021-06-21

## 2021-06-21 DIAGNOSIS — M79.7 FIBROMYALGIA: ICD-10-CM

## 2021-06-21 LAB
TTG IGA SER-ACNC: 1 U/ML
TTG IGG SER-ACNC: <1 U/ML

## 2021-06-21 NOTE — PATIENT INSTRUCTIONS
Labs as ordered  Continue current medication including duloxetine and Lyrica for pain management with occasional extra 30 mg dose of duloxetine in the morning during periods of increased pain (so total daily dose would be 90 mg)  Referral to [place]  [facility] regarding abdominal issues and possible irritable bowel syndrome.  Call [phone] to schedule consultation appointment  Continue knee management per chiropractor and Ortho  Continue current plantar fasciitis management.  If symptoms worsen, contact me for referral to [facility]

## 2021-06-21 NOTE — TELEPHONE ENCOUNTER
Prior Authorization Retail Medication Request    Medication/Dose: DULoxetine (CYMBALTA) 30 MG capsule  ICD code (if different than what is on RX):  M79.7  Previously Tried and Failed:    Rationale:      Insurance Name:  [facility]   Insurance ID:  [id number]      Pharmacy Information (if different than what is on RX)  Name:  [facility]  Phone:  [phone]

## 2021-06-22 NOTE — TELEPHONE ENCOUNTER
PA Initiation    Medication: DULoxetine (CYMBALTA) 30 MG capsule- INITIATED  Insurance Company: [employer] [place] - Phone [phone] Fax [phone]  Pharmacy Filling the Rx: [address]  Filling Pharmacy Phone: [phone]  Filling Pharmacy Fax: [phone]  Start Date: 6/22/2021

## 2021-06-22 NOTE — TELEPHONE ENCOUNTER
PRIOR AUTHORIZATION DENIED    Medication: DULoxetine (CYMBALTA) 30 MG capsule- DENIED    Denial Date: 6/22/2021    Denial Rational: THREE CAPS DAILY IS NOT COVERED.              Appeal Information:        Central Prior Authorization Team  Phone: [phone]

## 2021-06-23 ENCOUNTER — MYC MEDICAL ADVICE (OUTPATIENT)
Dept: INTERNAL MEDICINE | Facility: CLINIC | Age: 61
End: 2021-06-23

## 2021-06-23 RX ORDER — DULOXETIN HYDROCHLORIDE 30 MG/1
30 CAPSULE, DELAYED RELEASE ORAL 2 TIMES DAILY
Qty: 180 CAPSULE | Refills: 3 | Status: SHIPPED | OUTPATIENT
Start: 2021-06-23 | End: 2022-07-14

## 2021-08-24 ENCOUNTER — TRANSFERRED RECORDS (OUTPATIENT)
Dept: HEALTH INFORMATION MANAGEMENT | Facility: CLINIC | Age: 61
End: 2021-08-24

## 2021-08-25 ENCOUNTER — ANCILLARY PROCEDURE (OUTPATIENT)
Dept: GENERAL RADIOLOGY | Facility: CLINIC | Age: 61
End: 2021-08-25
Attending: PODIATRIST
Payer: COMMERCIAL

## 2021-08-25 ENCOUNTER — OFFICE VISIT (OUTPATIENT)
Dept: PODIATRY | Facility: CLINIC | Age: 61
End: 2021-08-25
Payer: COMMERCIAL

## 2021-08-25 VITALS
HEIGHT: 63 IN | SYSTOLIC BLOOD PRESSURE: 110 MMHG | DIASTOLIC BLOOD PRESSURE: 72 MMHG | WEIGHT: 132 LBS | BODY MASS INDEX: 23.39 KG/M2

## 2021-08-25 DIAGNOSIS — M76.822 POSTERIOR TIBIAL TENDINITIS OF BOTH LOWER EXTREMITIES: ICD-10-CM

## 2021-08-25 DIAGNOSIS — M20.12 HAV (HALLUX ABDUCTO VALGUS), LEFT: ICD-10-CM

## 2021-08-25 DIAGNOSIS — M72.2 PLANTAR FASCIITIS, BILATERAL: ICD-10-CM

## 2021-08-25 DIAGNOSIS — M79.672 FOOT PAIN, BILATERAL: Primary | ICD-10-CM

## 2021-08-25 DIAGNOSIS — M79.671 FOOT PAIN, BILATERAL: ICD-10-CM

## 2021-08-25 DIAGNOSIS — M79.7 FIBROMYALGIA: ICD-10-CM

## 2021-08-25 DIAGNOSIS — M76.821 POSTERIOR TIBIAL TENDINITIS OF BOTH LOWER EXTREMITIES: ICD-10-CM

## 2021-08-25 DIAGNOSIS — M20.42 HAMMERTOE OF LEFT FOOT: ICD-10-CM

## 2021-08-25 DIAGNOSIS — B35.1 ONYCHOMYCOSIS OF TOENAIL: ICD-10-CM

## 2021-08-25 DIAGNOSIS — G89.4 CHRONIC PAIN SYNDROME: ICD-10-CM

## 2021-08-25 DIAGNOSIS — M79.672 FOOT PAIN, BILATERAL: ICD-10-CM

## 2021-08-25 DIAGNOSIS — M79.671 FOOT PAIN, BILATERAL: Primary | ICD-10-CM

## 2021-08-25 PROCEDURE — 73630 X-RAY EXAM OF FOOT: CPT | Mod: LT | Performed by: RADIOLOGY

## 2021-08-25 PROCEDURE — 99204 OFFICE O/P NEW MOD 45 MIN: CPT | Performed by: PODIATRIST

## 2021-08-25 RX ORDER — CICLOPIROX OLAMINE 7.7 MG/G
CREAM TOPICAL DAILY
Qty: 30 G | Refills: 6 | Status: SHIPPED | OUTPATIENT
Start: 2021-08-25

## 2021-08-25 ASSESSMENT — MIFFLIN-ST. JEOR: SCORE: 1129.94

## 2021-08-25 NOTE — PROGRESS NOTES
PATIENT HISTORY:   Goldie Paul is a 61 year old female who presents to clinic for multiple issues.  Patient states that she thinks she has had plantar fasciitis for over 10 years.  She is tried injections, stretching, icing, oral NSAIDs, physical therapy, extracorporal shockwave therapy.  She is even had surgery on the left foot to release the plantar fascia and nothing has helped.  She notes that the heels are very sore.  They can be 10 out of 10 at its worst.  Notes that pain at its constant daily and can be a deep ache, throbbing, tingling, shooting pain.  She denies specific injury.  She is wondering what else can be done for the plantar fascia.  She does note that she has back issues that she sees a chiropractor for for her back and her knees.  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  She notes that she does have fibromyalgia as well.    Patient also notes that her right second toenail is darkened and she thinks that it is fungus.  She is wondering what can be done to get rid of the toenail fungus.    Patient also states that she is concerned about her bunions and her hammertoes and that they will cause pain at times to.  It is harder for her to wear shoes and the toes will rub in those areas.  She is wondering what if anything can be done for the hammertoes and the bunions.    Review of Systems:  Patient denies fever, chills, rash, wound, stiffness, weakness, heart burn, blood in stool, chest pain with activity, calf pain when walking, shortness of breath with activity, chronic cough, easy bleeding/bruising, swelling of ankles, excessive thirst, fatigue, depression, anxiety.  Patient admits to limping at times..     PAST MEDICAL HISTORY:   Past Medical History:   Diagnosis Date     Allergic rhinitis, cause unspecified      Disorders of bursae and tendons in shoulder region, unspecified 1/06    right     Endometriosis of other specified sites      Esophageal reflux      FIBROMYALGIA     Neg Rheum  work-up     Hyperlipidemia      Impingement syndrome of left shoulder     s/p cortisone injection     Insomnia 12/23/2011     Lateral epicondylitis  of elbow 2/06    left     Plantar fascial fibromatosis      Polyneuropathy in other diseases classified elsewhere (H)      Radial styloid tenosynovitis     bilateral     Seborrheic dermatitis      Sprain of hand, unspecified site 1/06    right thumb MCP joint     Uncomplicated asthma         PAST SURGICAL HISTORY:   Past Surgical History:   Procedure Laterality Date     ABDOMEN SURGERY  1992    hysterectomy     APPENDECTOMY  1977     [facility] NONSPECIFIC PROCEDURE  2004    Plantar fasciotomy     [facility] NONSPECIFIC PROCEDURE  1992    DAVID, BSO for endometriosis        MEDICATIONS:   Current Outpatient Medications:      cyclobenzaprine (FLEXERIL) 5 MG tablet, TAKE ONE OR TWO TABLETS BY MOUTH AT BEDTIME AS NEEDED for back pain or spasms, Disp: 60 tablet, Rfl: 11     DULoxetine (CYMBALTA) 30 MG capsule, Take 1 capsule (30 mg) by mouth 2 times daily, Disp: 180 capsule, Rfl: 3     estradiol (CLIMARA) 0.075 MG/24HR weekly patch, Place 1 patch onto the skin once a week, Disp: 12 patch, Rfl: 3     estradiol (ESTRACE) 0.1 MG/GM vaginal cream, Place 1 g vaginally twice a week Use daily for the first 14 days, Disp: 42.5 g, Rfl: 2     estradiol (VIVELLE-DOT) 0.05 MG/24HR bi-weekly patch, APPLY 1 PATCH  EXTERNALLY TO CLEAN, DRY, HAIRLESS SKIN BY TRANSDERMAL ROUTE TWICE WEEKLY. REMOVE OLD PATCH BEFORE APPLYING A NEW ONE., Disp: 8 patch, Rfl: 1     melatonin 3 MG tablet, Take 1 tablet (3 mg) by mouth nightly as needed for sleep, Disp: 100 tablet, Rfl: 3     order for DME, Orthotics for both feet, Disp: 1 Package, Rfl: 2     pantoprazole (PROTONIX) 40 MG EC tablet, TAKE 1 TABLET (40 MG) BY MOUTH once DAILY, Disp: 30 tablet, Rfl: 10     pregabalin (LYRICA) 75 MG capsule, TAKE ONE CAPSULE BY MOUTH THREE TIMES DAILY, Disp: 270 capsule, Rfl: 1     ALLERGIES:  No Known Allergies     SOCIAL HISTORY:    Social History     Socioeconomic History     Marital status:      Spouse name: Not on file     Number of children: Not on file     Years of education: Not on file     Highest education level: Not on file   Occupational History     Employer: [employer]      Comment: nurse   Tobacco Use     Smoking status: Never Smoker     Smokeless tobacco: Never Used   Substance and Sexual Activity     Alcohol use: No     Alcohol/week: 0.0 standard drinks     Drug use: No     Sexual activity: Yes     Partners: Male     Birth control/protection: Post-menopausal     Comment: Hyst   Other Topics Concern     Parent/sibling w/ CABG, MI or angioplasty before 65F 55M? No   Social History Narrative     Not on file     Social Determinants of Health     Financial Resource Strain:      Difficulty of Paying Living Expenses:    Food Insecurity:      Worried About Running Out of Food in the Last Year:      Ran Out of Food in the Last Year:    Transportation Needs:      Lack of Transportation (Medical):      Lack of Transportation (Non-Medical):    Physical Activity:      Days of Exercise per Week:      Minutes of Exercise per Session:    Stress:      Feeling of Stress :    Social Connections:      Frequency of Communication with Friends and Family:      Frequency of Social Gatherings with Friends and Family:      Attends [employer] Services:      Active Member of Clubs or Organizations:      Attends Club or Organization Meetings:      Marital Status:    Intimate Partner Violence:      Fear of Current or Ex-Partner:      Emotionally Abused:      Physically Abused:      Sexually Abused:         FAMILY HISTORY:   Family History   Problem Relation Age of Onset     Cardiovascular Mother      Diabetes Mother      Hypertension Mother      Cardiovascular Father         stents     Diabetes Father      Hypertension Father      Parkinsonism Father      Breast Cancer Sister         dx age 44     Diabetes Brother      Hypertension Brother  "        EXAM:Vitals: /72   Ht 1.588 m (5' 2.5\")   Wt 59.9 kg (132 lb)   BMI 23.76 kg/m    BMI= Body mass index is 23.76 kg/m .    General appearance: Patient is alert and fully cooperative with history & exam.  No sign of distress is noted during the visit.     Psychiatric: Affect is pleasant & appropriate.  Patient appears motivated to improve health.     Respiratory: Breathing is regular & unlabored while sitting.     HEENT: Hearing is intact to spoken word.  Speech is clear.  No gross evidence of visual impairment that would impact ambulation.     Dermatologic: Right second toenail is darkened and thickened.     Vascular: DP & PT pulses are intact & regular bilaterally.  No significant edema or varicosities noted.  CFT and skin temperature is normal to both lower extremities.     Neurologic: Lower extremity sensation is intact to light touch.  No evidence of weakness or contracture in the lower extremities.  No evidence of neuropathy.     Musculoskeletal: Patient is ambulatory without assistive device or brace.  Medial deviation of the left first metatarsal head and lateral deviation of the left great toe.  Left toes 2 through 4 are semiflexibly contracted at the PIPJ's.  Pain on palpation of the left plantar heel as well as the posterior tibial tendon.  Pain with inversion and eversion of the foot.    Radiographs: Bilateral foot x-rays I personally reviewed the xrays-left foot: Increase in the first and second intermetatarsal angle.  Lesser metatarsals are also adducted in position.   No fractures are noted.    Right foot: Increase in the first and second intermetatarsal angle.  Lesser metatarsals are also abducted in position.  Plantar heel spurs noted.  No fractures are noted.     ASSESSMENT:    Foot pain, bilateral  Plantar fasciitis, bilateral  Posterior tibial tendinitis of both lower extremities  Hav (hallux abducto valgus), left  Onychomycosis of toenail  Fibromyalgia  Chronic pain " syndrome  Hammertoe of left foot     Medical Decision Making/Plan:  Reviewed patient's chart in [facility].  Reviewed and discussed x-rays with patient. Reviewed and discussed causes of bunion with patient.  Explained that it is in large part due to a patients foot type and how they walk.   Discussed treatment options with patient including orthotics, splints, pads, and shoe gear.  We discussed that sometimes cortisone injections can help with the pain or physical therapy treatments such as ultrasound to help with pain but does not fix the problem.  Discussed that this is normally a structural issue in the foot and if conservative therapy doesn't work, surgery is considered.  Distal osteotomy versus fusion.  With a distal osteotomy procedure, patient is normally minimally weight bearing in a cam boot for 6 weeks.  With fusion, patient is normally non weight bearing for 6 weeks.  With any surgery, patient needs to take the first 2 weeks off work for icing and elevating and could possible due seated work only for the remaining 4 weeks after that.  We discussed risks of surgery including infection, neuritis, non union, need for further surgery.    Reviewed and discussed causes of hammertoes with patient.  Explained that this can be caused by an overpowering of muscles or by the way we walk.  Discussed conservative treatments such as orthotics, pads, shoe gear.  Explained that sometimes the flexor tendons can be cut to try and straighten the toe and reduce rubbing. This is normally done in office and patient is weight bearing in postop she for 1-2 weeks.  We also discussed surgical intervention to remove the joint and possibly fuse the toe.  Normally patient has a pin sticking out of the toe for about 6 weeks and can not get the foot wet. Patient would have to be minimal weight bearing in cam boot.      Reviewed and discussed causes of hammertoes with patient.  Explained that this can be caused by an overpowering of muscles  or by the way we walk.  Discussed conservative treatments such as orthotics, pads, shoe gear.  Explained that sometimes the flexor tendons can be cut to try and straighten the toe and reduce rubbing. This is normally done in office and patient is weight bearing in postop she for 1-2 weeks.  We also discussed surgical intervention to remove the joint and possibly fuse the toe.  Normally patient has a pin sticking out of the toe for about 6 weeks and can not get the foot wet. Patient would have to be minimal weight bearing in cam boot.      Based on clinical exam and x-ray she would likely need a midfoot fusion for the bunion correction as well as shortening metatarsal osteotomies of the second and third metatarsals and fusions of the PIPJ's of the toes 2 through 4 on the left foot.  Patient is not interested in surgery at this time and she is going to try inserts with a metatarsal pad and topical pain cream.    Discussed causes and treatments of nail fungus.  Explained that even if a culture comes back negative, a patient could still have nail fungus.  Discussed treatment options with patient and explained that there isn't one treatment that is 100% effective.  Discussed oral lamisil which is the most effective at about 70% but which can have liver effects.  Explained that if she wanted to try this that she would need serial blood draws to test her liver function.  Discussed over the counter antifungal creams.  Explained that these are about 50% effective and need to be applied once a day for about 6-8months.  Also talked about prescription penlac which is a nail laquer.  Again this is also only 50% effective.  Also discussed that if there was damage to the nail and the nail is now dystrophic that non of the above is going to change the nail.  If there was damage, there is note anything that can be done for the nail to correct it.  Discussed that if it becomes painful, we can remove the nail in clinic.        Patient  was given an order for an antifungal cream to apply to the affected nail daily.    The potential causes and nature of plantar fasciitis were discussed with the patient.  We reviewed the natural history/prognosis of the condition and risks if left untreated.  These include chronic pain, other sites of pain due to gait changes, and potential plantar fascial rupture.      We discussed possible causes of the condition as it relates to the patients specific situation.      Conservative treatment options were reviewed:  appropriate shoes, avoidance of barefoot walking, inserts/orthoses, stretching, ice, massage, immobilization and NSAIDs.     We also reviewed the options of injection therapy and surgery.  However, it was made clear that surgery is only considered when conservative therapy fails.  The risks and benefits of injection therapy, and surgery were discussed.    Reviewed and discussed causes of tendonitis.  We discussed treatments such as immobiliation, icing, stretching, heel lifts, orthotics, physical therapy, MRI.      At this time I recommend MRIs of both ankles to assess the plantar fascia and the posterior tibial tendons.  She has tried multiple conservative treatments that have failed and I am concerned that this might not be coming from the plantar fascia and may be more from the back or possibly a systemic arthritis.  We will have her wear ankle braces at this time on both feet.  Once we get the MRI results back we will call her.  If there is some plantar fasciitis or tendinitis we can try an injection into the plantar fascia and possible physical therapy with iontophoresis and electrical nerve stimulation.  However if these come back negative then I would refer on to rheumatology and/or possibly neurology for further work-up of cause of pain.  All questions were answered to patient satisfaction and she will call further questions or concerns.      Patient risk factor: Patient is at low risk for  infection.        Emiliana Iqbal DPM, Podiatry/Foot and Ankle Surgery

## 2021-08-25 NOTE — LETTER
8/25/2021         RE: Goldie Paul  [id number] [address] [facility] [address]        Dear Colleague,    Thank you for referring your patient, Goldie Paul, to the [facility]. Please see a copy of my visit note below.    PATIENT HISTORY:   Goldie Paul is a 61 year old female who presents to clinic for multiple issues.  Patient states that she thinks she has had plantar fasciitis for over 10 years.  She is tried injections, stretching, icing, oral NSAIDs, physical therapy, extracorporal shockwave therapy.  She is even had surgery on the left foot to release the plantar fascia and nothing has helped.  She notes that the heels are very sore.  They can be 10 out of 10 at its worst.  Notes that pain at its constant daily and can be a deep ache, throbbing, tingling, shooting pain.  She denies specific injury.  She is wondering what else can be done for the plantar fascia.  She does note that she has back issues that she sees a chiropractor for for her back and her knees.  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  She notes that she does have fibromyalgia as well.    Patient also notes that her right second toenail is darkened and she thinks that it is fungus.  She is wondering what can be done to get rid of the toenail fungus.    Patient also states that she is concerned about her bunions and her hammertoes and that they will cause pain at times to.  It is harder for her to wear shoes and the toes will rub in those areas.  She is wondering what if anything can be done for the hammertoes and the bunions.    Review of Systems:  Patient denies fever, chills, rash, wound, stiffness, weakness, heart burn, blood in stool, chest pain with activity, calf pain when walking, shortness of breath with activity, chronic cough, easy bleeding/bruising, swelling of ankles, excessive thirst, fatigue, depression, anxiety.  Patient admits to limping at times..     PAST MEDICAL HISTORY:   Past  Medical History:   Diagnosis Date     Allergic rhinitis, cause unspecified      Disorders of bursae and tendons in shoulder region, unspecified 1/06    right     Endometriosis of other specified sites      Esophageal reflux      FIBROMYALGIA     Neg Rheum work-up     Hyperlipidemia      Impingement syndrome of left shoulder     s/p cortisone injection     Insomnia 12/23/2011     Lateral epicondylitis  of elbow 2/06    left     Plantar fascial fibromatosis      Polyneuropathy in other diseases classified elsewhere (H)      Radial styloid tenosynovitis     bilateral     Seborrheic dermatitis      Sprain of hand, unspecified site 1/06    right thumb MCP joint     Uncomplicated asthma         PAST SURGICAL HISTORY:   Past Surgical History:   Procedure Laterality Date     ABDOMEN SURGERY  1992    hysterectomy     APPENDECTOMY  1977     [facility] NONSPECIFIC PROCEDURE  2004    Plantar fasciotomy     [facility] NONSPECIFIC PROCEDURE  1992    DAVID, BSO for endometriosis        MEDICATIONS:   Current Outpatient Medications:      cyclobenzaprine (FLEXERIL) 5 MG tablet, TAKE ONE OR TWO TABLETS BY MOUTH AT BEDTIME AS NEEDED for back pain or spasms, Disp: 60 tablet, Rfl: 11     DULoxetine (CYMBALTA) 30 MG capsule, Take 1 capsule (30 mg) by mouth 2 times daily, Disp: 180 capsule, Rfl: 3     estradiol (CLIMARA) 0.075 MG/24HR weekly patch, Place 1 patch onto the skin once a week, Disp: 12 patch, Rfl: 3     estradiol (ESTRACE) 0.1 MG/GM vaginal cream, Place 1 g vaginally twice a week Use daily for the first 14 days, Disp: 42.5 g, Rfl: 2     estradiol (VIVELLE-DOT) 0.05 MG/24HR bi-weekly patch, APPLY 1 PATCH  EXTERNALLY TO CLEAN, DRY, HAIRLESS SKIN BY TRANSDERMAL ROUTE TWICE WEEKLY. REMOVE OLD PATCH BEFORE APPLYING A NEW ONE., Disp: 8 patch, Rfl: 1     melatonin 3 MG tablet, Take 1 tablet (3 mg) by mouth nightly as needed for sleep, Disp: 100 tablet, Rfl: 3     order for DME, Orthotics for both feet, Disp: 1 Package, Rfl: 2     pantoprazole  (PROTONIX) 40 MG EC tablet, TAKE 1 TABLET (40 MG) BY MOUTH once DAILY, Disp: 30 tablet, Rfl: 10     pregabalin (LYRICA) 75 MG capsule, TAKE ONE CAPSULE BY MOUTH THREE TIMES DAILY, Disp: 270 capsule, Rfl: 1     ALLERGIES:  No Known Allergies     SOCIAL HISTORY:   Social History     Socioeconomic History     Marital status:      Spouse name: Not on file     Number of children: Not on file     Years of education: Not on file     Highest education level: Not on file   Occupational History     Employer: [employer]      Comment: nurse   Tobacco Use     Smoking status: Never Smoker     Smokeless tobacco: Never Used   Substance and Sexual Activity     Alcohol use: No     Alcohol/week: 0.0 standard drinks     Drug use: No     Sexual activity: Yes     Partners: Male     Birth control/protection: Post-menopausal     Comment: Hyst   Other Topics Concern     Parent/sibling w/ CABG, MI or angioplasty before 65F 55M? No   Social History Narrative     Not on file     Social Determinants of Health     Financial Resource Strain:      Difficulty of Paying Living Expenses:    Food Insecurity:      Worried About Running Out of Food in the Last Year:      Ran Out of Food in the Last Year:    Transportation Needs:      Lack of Transportation (Medical):      Lack of Transportation (Non-Medical):    Physical Activity:      Days of Exercise per Week:      Minutes of Exercise per Session:    Stress:      Feeling of Stress :    Social Connections:      Frequency of Communication with Friends and Family:      Frequency of Social Gatherings with Friends and Family:      Attends Anabaptism Services:      Active Member of Clubs or Organizations:      Attends Club or Organization Meetings:      Marital Status:    Intimate Partner Violence:      Fear of Current or Ex-Partner:      Emotionally Abused:      Physically Abused:      Sexually Abused:         FAMILY HISTORY:   Family History   Problem Relation Age of Onset      "Cardiovascular Mother      Diabetes Mother      Hypertension Mother      Cardiovascular Father         stents     Diabetes Father      Hypertension Father      Parkinsonism Father      Breast Cancer Sister         dx age 44     Diabetes Brother      Hypertension Brother         EXAM:Vitals: /72   Ht 1.588 m (5' 2.5\")   Wt 59.9 kg (132 lb)   BMI 23.76 kg/m    BMI= Body mass index is 23.76 kg/m .    General appearance: Patient is alert and fully cooperative with history & exam.  No sign of distress is noted during the visit.     Psychiatric: Affect is pleasant & appropriate.  Patient appears motivated to improve health.     Respiratory: Breathing is regular & unlabored while sitting.     HEENT: Hearing is intact to spoken word.  Speech is clear.  No gross evidence of visual impairment that would impact ambulation.     Dermatologic: Right second toenail is darkened and thickened.     Vascular: DP & PT pulses are intact & regular bilaterally.  No significant edema or varicosities noted.  CFT and skin temperature is normal to both lower extremities.     Neurologic: Lower extremity sensation is intact to light touch.  No evidence of weakness or contracture in the lower extremities.  No evidence of neuropathy.     Musculoskeletal: Patient is ambulatory without assistive device or brace.  Medial deviation of the left first metatarsal head and lateral deviation of the left great toe.  Left toes 2 through 4 are semiflexibly contracted at the PIPJ's.  Pain on palpation of the left plantar heel as well as the posterior tibial tendon.  Pain with inversion and eversion of the foot.    Radiographs: Bilateral foot x-rays I personally reviewed the xrays-left foot: Increase in the first and second intermetatarsal angle.  Lesser metatarsals are also adducted in position.   No fractures are noted.    Right foot: Increase in the first and second intermetatarsal angle.  Lesser metatarsals are also abducted in position.  Plantar " heel spurs noted.  No fractures are noted.     ASSESSMENT:    Foot pain, bilateral  Plantar fasciitis, bilateral  Posterior tibial tendinitis of both lower extremities  Hav (hallux abducto valgus), left  Onychomycosis of toenail  Fibromyalgia  Chronic pain syndrome  Hammertoe of left foot     Medical Decision Making/Plan:  [facility].  Reviewed and discussed x-rays with patient. Reviewed and discussed causes of bunion with patient.  Explained that it is in large part due to a patients foot type and how they walk.   Discussed treatment options with patient including orthotics, splints, pads, and shoe gear.  We discussed that sometimes cortisone injections can help with the pain or physical therapy treatments such as ultrasound to help with pain but does not fix the problem.  Discussed that this is normally a structural issue in the foot and if conservative therapy doesn't work, surgery is considered.  Distal osteotomy versus fusion.  With a distal osteotomy procedure, patient is normally minimally weight bearing in a cam boot for 6 weeks.  With fusion, patient is normally non weight bearing for 6 weeks.  With any surgery, patient needs to take the first 2 weeks off work for icing and elevating and could possible due seated work only for the remaining 4 weeks after that.  We discussed risks of surgery including infection, neuritis, non union, need for further surgery.    Reviewed and discussed causes of hammertoes with patient.  Explained that this can be caused by an overpowering of muscles or by the way we walk.  Discussed conservative treatments such as orthotics, pads, shoe gear.  Explained that sometimes the flexor tendons can be cut to try and straighten the toe and reduce rubbing. This is normally done in office and patient is weight bearing in postop she for 1-2 weeks.  We also discussed surgical intervention to remove the joint and possibly fuse the toe.  Normally patient has a pin  sticking out of the toe for about 6 weeks and can not get the foot wet. Patient would have to be minimal weight bearing in cam boot.      Reviewed and discussed causes of hammertoes with patient.  Explained that this can be caused by an overpowering of muscles or by the way we walk.  Discussed conservative treatments such as orthotics, pads, shoe gear.  Explained that sometimes the flexor tendons can be cut to try and straighten the toe and reduce rubbing. This is normally done in office and patient is weight bearing in postop she for 1-2 weeks.  We also discussed surgical intervention to remove the joint and possibly fuse the toe.  Normally patient has a pin sticking out of the toe for about 6 weeks and can not get the foot wet. Patient would have to be minimal weight bearing in cam boot.      Based on clinical exam and x-ray she would likely need a midfoot fusion for the bunion correction as well as shortening metatarsal osteotomies of the second and third metatarsals and fusions of the PIPJ's of the toes 2 through 4 on the left foot.  Patient is not interested in surgery at this time and she is going to try inserts with a metatarsal pad and topical pain cream.    Discussed causes and treatments of nail fungus.  Explained that even if a culture comes back negative, a patient could still have nail fungus.  Discussed treatment options with patient and explained that there isn't one treatment that is 100% effective.  Discussed oral lamisil which is the most effective at about 70% but which can have liver effects.  Explained that if she wanted to try this that she would need serial blood draws to test her liver function.  Discussed over the counter antifungal creams.  Explained that these are about 50% effective and need to be applied once a day for about 6-8months.  Also talked about prescription penlac which is a nail laquer.  Again this is also only 50% effective.  Also discussed that if there was damage to the  nail and the nail is now dystrophic that non of the above is going to change the nail.  If there was damage, there is note anything that can be done for the nail to correct it.  Discussed that if it becomes painful, we can remove the nail in clinic.        Patient was given an order for an antifungal cream to apply to the affected nail daily.    The potential causes and nature of plantar fasciitis were discussed with the patient.  We reviewed the natural history/prognosis of the condition and risks if left untreated.  These include chronic pain, other sites of pain due to gait changes, and potential plantar fascial rupture.      We discussed possible causes of the condition as it relates to the patients specific situation.      Conservative treatment options were reviewed:  appropriate shoes, avoidance of barefoot walking, inserts/orthoses, stretching, ice, massage, immobilization and NSAIDs.     We also reviewed the options of injection therapy and surgery.  However, it was made clear that surgery is only considered when conservative therapy fails.  The risks and benefits of injection therapy, and surgery were discussed.    Reviewed and discussed causes of tendonitis.  We discussed treatments such as immobiliation, icing, stretching, heel lifts, orthotics, physical therapy, MRI.      At this time I recommend MRIs of both ankles to assess the plantar fascia and the posterior tibial tendons.  She has tried multiple conservative treatments that have failed and I am concerned that this might not be coming from the plantar fascia and may be more from the back or possibly a systemic arthritis.  We will have her wear ankle braces at this time on both feet.  Once we get the MRI results back we will call her.  If there is some plantar fasciitis or tendinitis we can try an injection into the plantar fascia and possible physical therapy with iontophoresis and electrical nerve stimulation.  However if these come back negative  then I would refer on to rheumatology and/or possibly neurology for further work-up of cause of pain.  All questions were answered to patient satisfaction and she will call further questions or concerns.      Patient risk factor: Patient is at low risk for infection.        Emiliana Iqbal DPM, Podiatry/Foot and Ankle Surgery        Again, thank you for allowing me to participate in the care of your patient.        Sincerely,        Emiliana Iqbal DPM, Podiatry/Foot and Ankle Surgery

## 2021-08-25 NOTE — PATIENT INSTRUCTIONS
Thank you for choosing [facility]!    DR. HARRIS'S CLINIC:  [facility]   [address]   [place] [address]   [phone]  [phone]      SCHEDULE SURGERY: [phone]   BILLING QUESTIONS: [phone]   APPOINTMENTS: [phone]   CONSUMER PRICE LINE: [phone]      Follow up: we will call with mri results.    Please call to schedule your MRI/CT/Ultrasound/Arthrogram appointment.  The number is [phone].      Home Medical Equipment:  1. [facility]    [facility] -[address]  [address]   [place] [phone]     2. [facility] - [address], [place], [phone]    [facility]  [facility]  [address]  [place] [address]  Phone: [phone]  Fax: [phone] [facility]  [address]  [place] [address]  Phone: [phone]   Fax: [phone]   [facility]  [address]  [place] [address]  Phone: [phone]  Fax: [phone] [facility]  [address]  [place] [address]  Phone: [phone]   Fax: [phone]   [facility]   [address]  [place] [address]  Phone: [phone]  Fax: [phone] * Please call any location listed to make an appointment for a casting/fitting. Your referral was sent to their central office and they will all have the order on file.     WEARING YOUR CUSTOM FOOT ORTHOTICS   Most insurance plans cover one pair of orthotics per year. You must check with your   insurance plan to see what your payment responsibility will be. Please call your   insurance company by calling the number on the back of your insurance card.   Orthotic's are non-refundable and non-returnable.    Orthotics are made of various designs. Some orthotics are covered with material that extends beyond your toes. If your orthotic is of this design, you will likely need to trim the toe end to get a proper fit. The insole from your shoe can be used as a template. Simply overlay the shoe insert on top of the custom orthotic. Align the heel end while tracing the length of the insert onto the custom orthotic. Use a large scissor to trim the toe end until you get a proper fit in the shoe.   The orthotic needs to be pushed as far back in the shoe as possible. The heel portion should not ride forward so as not to irritate your heel.   Orthotics are designed to work with socks. Excessive perspiration will shorten the life span of the orthotics. Remove the orthotic from the shoe frequently for proper drying.   The break-in period lasts for weeks. People new to orthotics will likely experience new aches and pains. The orthotic is forcing your foot into a new position. Arch, foot and leg muscle aches and fatigue are common during these weeks. Minor discomfort can be considered normal break in phenomenon. Start wearing your orthotic around your home your first day. Limited activity for one to two hours is recommended. You can increase one or two additional hours each day provided the aches and pains are subsiding. The degree of discomfort, fatigue and problems will dictate the speed of break in. You may require multiple weeks to work up to full time use.   Do not continue wearing your orthotics if they are creating problems such as blisters or sores. Do not hesitate to call the clinic to speak with a nurse regarding orthotic   break in, fit, trimming, etc. You may also need to see the doctor if the orthotics are   simply not working out. Adjustments are sometimes made to improve orthotic   function.     Orthotics will only work in certain styles and types of shoes. Orthotics rarely work in dress shoes. Slip-ons, clogs, sandals  and heels are particularly troublesome. Specially designed orthotics may be necessary for these types of shoes. Your custom orthotic was designed for activities that require appropriate walking or running shoes. Lace up athletic shoes, walking shoes or work boots should work appropriately. You may need a wider or longer shoe. Shoes with a removable  or insert work best. In general, you want to remove an insert from the shoe before placing the orthotic into the shoe. Shoes without a removable liner may not work as well.     When purchasing new shoes, bring your orthotics along to get a proper fit. Shop at stores that are familiar with orthotics.   Frequent washing of the orthotic may shorten the life span of the top cover. The top cover can be replaced but will generally last one to five years depending on use and foot perspiration.     PLANTAR FASCIITIS  Plantar fasciitis is often referred to as heel spurs or heel pain. Plantar fasciitis is a very common problem that affects people of all foot shapes, age, weight and activity level. Pain may be in the arch or on the weight-bearing surface of the heel. The pain may come on without injury or identifiable cause. Pain is generally present when first getting out of bed in the morning or up from a seated break.     CAUSES  The plantar fascia is a dense fibrous band of tissue that stretches across the bottom surface of the foot. The fascia helps support the foot muscles and arch. Plantar fasciitis is thought to be caused by mechanical strain or overload. Frequent walking without shoes or wearing unsupportive shoes is thought to cause structural overload and ultimately inflammation of the plantar fascia. Some people have heel spurs that can be seen on x-ray. The heel spur is actually a minor component of plantar fascitis and is largely ignored.       SELF TREATMENT   The easiest solution is to stop walking around your home without shoes. Plantar fasciitis is  largely a shoe problem. Shoes are either not being worn often enough or your current shoes are inadequate for your weight, foot structure or activity level. The majority of shoes on the market today are not sufficient to resist development of plantar fasciitis or to promote healing. Assume that your current shoes are inadequate and will need to be replaced. Even high quality shoes wear out with 6 months to one year of frequent use. Weight loss is another option. Losing ten pounds in the next two months may be enough to resolve the problem. Ice applied to the area of pain two to three times per day for ten minutes each session can be very helpful. Warm foot soaks in epsom salts can also relieve pain. This should continue until the problem resolves. Achilles tendon stretching is essential. Stretch multiple times daily to promote healing and to prevent recurrence in the future. Over all stretching of the body is helpful as well such as the calves, thighs and lower back. Normally when one area of the body is tight, other areas are too. Gentle Yoga can be good for this.     Over the counter topical anti inflammatories can be helpful such as biofreeze, bengay, salon pas, ect...  Oral ibuprofen or aleve is recommended as well to try to calm down inflammation.     Night splints can be helpful to gradually stretch the foot at night as a lot of pain is when you get up in the morning. Taking a towel or thera band and stretching the foot back multiple times before you get ou of bed can be beneficial as well.     MEDICAL TREATMENT  Medical treatments often include custom arch supports, cortisone injections, physical therapy, splints to be worn in bed, prescription medications and surgery. The home treatments listed above will be necessary regardless of these advanced medical treatments. Surgery is rarely needed but is very helpful in selected cases.     PROGNOSIS  Plantar fasciitis can last from one day to a lifetime. Some  people get intermittent fascitis that is very short-lived. Others suffer daily for years. Excessive body weight, frequent bare foot walking, long hours on the feet, inadequate shoes, predisposing foot structures and excessive activity such as running are all potential issues that lead to chronic and/or recurring plantar fascitis. Having plantar fasciitis means that you are forever prone to this problem and will require modification of some of the above factors. Most people seek treatment within one to four months. Healing usually requires a similar one to four month time frame. Healing time is relative to the amount of effort spent treating the problem.   Plantar fasciitis is highly recurrent. Risk factors often continue, including return to bare foot walking, inadequate shoes, excessive body weight, excessive activities, etc. Your life style and foot structure may predispose you to recurrent plantar fasciitis. A daily prevention regimen can be very helpful. Ongoing use of shoe inserts, careful attention to appropriate shoes, daily Achilles stretching, etc. may prevent recurrence. Prompt attention at the earliest warning signs of heel pain can resolve the problem in as short as a few days.     EXERCISES  Stair Exercise: Step on the stairs with the ball of your foot and hold your position for at least 15 seconds, then slowly step down with the heels of your foot. You can do this daily and as often as you want.   Picking the Towel: Sit comfortably and then pick the towel up with your toes. You can use any object other than a towel as long as the material can be soft and you can pick it up with your toes.  Rolling the Bottle: Use a small ball or frozen water bottle and then roll it around with your foot.   Flex the Toes: Sit comfortably and then flex your toes by pointing it towards the floor or towards your body. This will relax and flex your foot and exercise your plantar fascia, the calf, and the Achilles tendon.  The inability of the foot to stretch often causes the bunching up of the plantar fascia area leading to the pain.  Calf/Achilles Stretching: Lay on you back and raise one foot, then point your toes towards the floor. See photo below:               Hold each stretch for 10 seconds. Stretch 10 times per set, three sets per day. Morning, afternoon and evening. If your heel pain is very severe in the morning, consider doing the first set of stretches before you get out of bed.      OVER THE COUNTER INSERT RECOMMENDATIONS  SuperFeet   Sofsole Fit Spenco   Power Step   Walk-Fit Arch Cradles     Most of these can be found at your local [employer], [employer], or online.  **A good high quality over the counter insert should cost around $40-$50      [employer] LOCATIONS  [address]  [phone]   [address]  [phone] [place]  [address]  [phone]   [place]  [address]  [phone]   [place]  [address]  [phone] [place]  [address] [place] [address] [place]  [phone]   [place]  [address]  [phone]   [place]  [address] [place] [address]  [phone] [place]  [address]  [phone]       NAIL FUNGUS / ONYCHOMYCOSIS   Nail fungus is not a hygiene problem and will likely not lead to significant medical problems. The nails may get thick causing pain and possibly local skin infection. Treatments include debridement (trimming), oral antifungals, topical antifungals and complete removal of the nail. Most fungal nails are not treated.     Topicals such as tea tree oil can be helpful for surface fungus and may, at best, limit progression. Over the counter creams (such as Lamisil) can also be used however, their effectiveness is also quite low.  Topical treatment with Pen lac is expensive and often not covered by insurance. Pen lac has an approximate 8% success rate. Topical therapy recommendations is to  apply twice a day for at least 3-4 months as it takes 9 months for new nail to grow out.     Experts suggest soaking your feet for 15 to 20 minutes in a mixture of 1 cup vinegar to 4 cups warm water. Be sure to rinse well and pat your feet dry when you're done. You can soak your feet like this daily. But if your skin becomes irritated, try soaking only two to three times a week. Vicks VapoRub, as with vinegar, there have been no controlled clinical trials to assess the effectiveness of Vicks VapoRub on nail fungus, but there have been numerous anecdotal reports that it works. There's no consensus on how often to apply this product, so check with your doctor before using it on your nails.      Oral therapies include Sporanox and Lamisil. Oral therapies are also expensive and not very effective. Side effects such as liver disease are the main concern. Return of fungus is common even if the treatment worked.      Other Tips:  - Penlac nail medication apply daily x 4 months; remove old polish first day of each week  - Antifungal cream/powder (Zeasorb) - apply daily to feet and shoes x 2 months  - Clean shoes with Lysol or in washing machine every few weeks  - Rotate shoe gear; give them 24 hours to dry out between days wearing them  - Clean pair of socks in morning, clean pair in afternoon if your feet sweat  - Shower shoes used in public showers/pools        BUNION (HALLUX ABDUCTO VALGUS)  A bunion is caused by muscle imbalance. The great toe is pulled toward the smaller toes. The metatarsal head is pushed outward creating a lump on the side of your foot. Imbalance is the result of foot structure and instability.   Bunions do not improve with time. They usually enlarge, however this is a fairly slow process. Shoes do not necessarily cause bunions, however, they can hasten development and definitely cause bunions to hurt.   Bunions often run in families. We inherit a certain foot structure, which may be predisposed to  bunion development.   Bunion pain is likely a combination of shoes rubbing on the bump, nerve irritation, compression between the toes, joint misalignment, arthritis and altered gait.   SYMPTOMS   Bunions are usually termed mild, moderate or severe. Just because you have a bunion does not mean you have to have pain. There are some people with very severe bunions and no pain and people with mild bunions and a lot of pain.   - Pain on the inside of your foot at the big toe joint (1st MTPJ)   - Swelling on the inside of your foot at the big toe joint   - Redness on the inside of your foot at the big toe joint   - Numbness or burning in the big toe (hallux)   - Decreased motion at the big toe joint   - Painful bursa (fluid-filled sac) on the inside of your foot at the big toe joint   - Pain while wearing shoes -especially shoes too narrow or with high heels    - Pain during activities   - Corn in between the big toe and second toe   - Callous formation on the side or bottom of the big toe or big toe joint   - Callous under the second toe joint (2nd MTPJ)   - Pain in the second toe joint   TREATMENT  Conservative (non-surgical) treatment will not make the bunion go away, but it will hopefully decrease the signs and symptoms you have and help you get rid of the pain and get you back to your activities.   1.  Wider shoes or extra depth shoes: Most bunion pain can be improved simply by wearing compatible shoes. People with bunions cannot choose footwear simply because they like the style. Your bunion should determine which shoes are to be worn. Wide shoes with nonirritating seams,soft leather and a square toe box are most compatible with a bunion. Shoes should fit appropriately right out of the box but may need to be professionally stretched and modified to accommodate the bump. Heels, dress shoes and shoes with pointed toes will not be comfortable.   2. NSAIDs   3.  Arch supports, custom inserts, padding, splints, toe  spacers : Most bunion pain can be improved simply by wearing compatible shoes. People with bunions cannot choose footwear simply because they like the style. Your bunion should determine which shoes are to be worn. Wide shoes with nonirritating seams,soft leather and a square toe box are most compatible with a bunion. Shoes should fit appropriately right out of the box but may need to be professionally stretched and modified to accommodate the bump. Heels, dress shoes and shoes with pointed toes will not be comfortable.   4.  Change activities   5.  Physical therapy  SURGERY  Surgical treatment for bunions is sometimes needed. If you are limited by pain, cannot fit in shoes comfortably and are not able to do your daily activities then surgery may be a good option for you. There are many different surgical procedures to repair bunions. Your foot and ankle surgeon will review your foot exam findings, your x-rays, your age, your health, your lifestyle, your physical activity level and discuss with you which procedure he or she would recommend. Surgical procedures for bunions range from soft tissue repair to cutting and realigning the bones. It is not recommended that you have bunion surgery for cosmetic reasons (you do not like how your foot looks) or because you want to fit in a certain pair of shoes; There is the risk that even after surgery, the bunion will reoccur 9-10% of the time.   Bunion surgery involves cutting and repositioning the bones surrounding the bunion. Pins and screws are used to hold the bones in place during the healing process. The goal of bunion surgery is to reduce the size of the bunion bump. Realignment of the toe and joint is attempted.     Some first toes cannot be forced back into normal alignment even with surgery. Surgery is helpful in most cases but does not necessarily create a normal foot.   Healing after surgery requires about six weeks of protection. This allows the bone to heal.  Maximum recovery takes about one year. The scar tissue and jOint structures require this amount of time to finish the healing process. Expect stiffness, swelling and numbness during that time frame. Bunion surgery does involve side effects. Some side effects are predictable and others are less common but do occur. A scar will be visible and could be irritated by shoes. The shoe may rub on the screw or internal pin requiring surgical removal of these fixation devices. The screw and pin would likely be left in place for a full year. The first toe may loose motion after bunion surgery. The amount of stiffness is variable. Some people never regain normal motion of the first toe. This is due to scar tissue inherent to any surgery. The first toe may drift toward the second toe or away from the second toe. Spreading of the first and second toes is a rare occurrence after bunion surgery. This can be quite bothersome and would need to be surgically repaired. Toe drift toward the second toe could result in a recurrent bunion and revision surgery. Joint fusion is one option to correct an unstable, drifting toe. This procedure straightens the toe, however, no motion remains. Fusion may be necessary to correct complications of bunion surgery or as the original procedure in severe cases.   All surgical procedures involve risk of infection, numbness, pain, delayed healing, osteotomy dislocation, blood clots, continued foot pain, etc. Bunion surgery is quite complex and should not be taken lightly.   Any skin incision can lead to infection. Deep infection might involve the bone and thus repeat surgery and six weeks of IV antibiotics. Scar tissue can cause nerve pain or numbness. This is generally temporary but can be permanent. We do not have treatments that cure nerve problems. Second toe pain could be related to altered mechanics and pressure transferred to the second toe. Most feet with bunions have pre-existing second toe  problems. Delayed bone healing would lengthen the healing time. Some bones simply do not heal. This requires repeat surgery, electronic bone stimulation and/or extended protection. Smokers have an approximate 20% chance of poor bone healing. This is double that of a non-smoker. The bone cut may displace. This may need to be repaired with a second operation. Displacement can cause jOint malalignment. Immobility after surgery can cause blood clots in the legs and lungs. This could result in death.   Foot pain is complex. Most feet hurt for more than one reason. Fixing the bunion would not necessarily create a pain free foot. Appropriate shoes, healthy body weight, avoidance of bare foot walking and moderation of activity will always be necessary to enjoy foot comfort. Your bunion may involve arthritis, which is incurable even with surgery. Long standing bunions often involve chronic irritation to the surrounding nerves. Nerve pain may not resolve even with reducing the bunion bump since permanent nerve damage may be present   Bunion surgery is nevertheless quite successful. Most surgical patients are pleased with their foot following bunion surgery. Many of the issues described above can be controlled by taking proper care of your foot during the healing process.   Your surgeon would be happy to fully describe any of the above issues. You should pursue a full understanding of the operation,recovery process and any potential problems that could develop.   PREVENTION  1.  Do not wear high heels if there is a family history of bunions.  2.  Wear shoes that have enough width and depth in the toe box  Here are exercises that may benefit people with bunions:   Toe stretches - Stretching out your toes can help keep them limber and offset foot pain. To stretch your toes, point your toes straight ahead for 5 seconds and then curl them under for 5 seconds. Repeat these stretches 10 times. These exercises can be especially  beneficial if you also have hammertoes, or chronically bent toes, in addition to a bunion.   Toe flexing and nicki - Press your toes against a hard surface such as a wall, to flex and stretch them; hold the position for 10 seconds and repeat three to four times. Then flex your toes in the opposite direction; hold the position for 10 seconds and repeat three to four times.   Stretching your big toe - Using your fingers to gently pull your big toe over into proper alignment can be helpful as well. Hold your toe in position for 10 seconds and repeat three to four times.   Resistance exercises - Wrap either a towel or belt around your big toe and use it to pull your big toe toward you while simultaneously pushing forward, against the towel, with your big toe.   Ball roll - To massage the bottom of your foot, sit down, place a golf ball on the floor under your foot, and roll it around under your foot for two minutes. This can help relieve foot strain and cramping.   Towel curls - You can strengthen your toes by spreading out a small towel on the floor, curling your toes around it, and pulling it toward you. Repeat five times. Gripping objects with your toes like this can help keep your foot flexible.   Picking up marbles - Another gripping exercise you can perform to keep your foot flexible is picking up marbles with your toes. Do this by placing 20 marbles on the floor in front of you and use your foot to pick the marbles up one by one and place them in a bowl.   Walking along the beach - Whenever possible, spend time walking on sand. This can give you a gentle foot massage and also help strengthen your toes. This is especially beneficial for people who have arthritis associated with their bunions.

## 2021-09-01 ENCOUNTER — MEDICAL CORRESPONDENCE (OUTPATIENT)
Dept: HEALTH INFORMATION MANAGEMENT | Facility: CLINIC | Age: 61
End: 2021-09-01

## 2021-09-02 DIAGNOSIS — R20.0 TACTILE ANESTHESIA: ICD-10-CM

## 2021-09-02 DIAGNOSIS — M25.50 PAIN IN JOINT, MULTIPLE SITES: ICD-10-CM

## 2021-09-02 DIAGNOSIS — E55.9 AVITAMINOSIS D: ICD-10-CM

## 2021-09-02 DIAGNOSIS — G93.32 CHRONIC FATIGUE SYNDROME: Primary | ICD-10-CM

## 2021-09-02 DIAGNOSIS — M79.7 SCAPULOHUMERAL FIBROSITIS: ICD-10-CM

## 2021-09-02 DIAGNOSIS — Z78.0 MENOPAUSE: ICD-10-CM

## 2021-09-02 DIAGNOSIS — Z86.19 HISTORY OF HEPATITIS B: ICD-10-CM

## 2021-09-09 ENCOUNTER — HOSPITAL ENCOUNTER (OUTPATIENT)
Dept: GENERAL RADIOLOGY | Facility: CLINIC | Age: 61
Discharge: HOME | End: 2021-09-09
Attending: ANESTHESIOLOGY | Admitting: ANESTHESIOLOGY
Payer: COMMERCIAL

## 2021-09-09 DIAGNOSIS — M25.562 PAIN IN LEFT KNEE: ICD-10-CM

## 2021-09-09 DIAGNOSIS — G89.29 OTHER CHRONIC PAIN: ICD-10-CM

## 2021-09-09 DIAGNOSIS — M25.561 PAIN IN RIGHT KNEE: ICD-10-CM

## 2021-09-09 PROCEDURE — 73562 X-RAY EXAM OF KNEE 3: CPT | Mod: 50

## 2021-09-23 ENCOUNTER — HOSPITAL ENCOUNTER (OUTPATIENT)
Dept: MRI IMAGING | Facility: CLINIC | Age: 61
End: 2021-09-23
Attending: PODIATRIST
Payer: COMMERCIAL

## 2021-09-23 DIAGNOSIS — M72.2 PLANTAR FASCIITIS, BILATERAL: ICD-10-CM

## 2021-09-23 DIAGNOSIS — M79.672 FOOT PAIN, BILATERAL: ICD-10-CM

## 2021-09-23 DIAGNOSIS — M79.7 FIBROMYALGIA: ICD-10-CM

## 2021-09-23 DIAGNOSIS — M79.671 FOOT PAIN, BILATERAL: ICD-10-CM

## 2021-09-23 DIAGNOSIS — M76.821 POSTERIOR TIBIAL TENDINITIS OF BOTH LOWER EXTREMITIES: ICD-10-CM

## 2021-09-23 DIAGNOSIS — M20.12 HAV (HALLUX ABDUCTO VALGUS), LEFT: ICD-10-CM

## 2021-09-23 DIAGNOSIS — B35.1 ONYCHOMYCOSIS OF TOENAIL: ICD-10-CM

## 2021-09-23 DIAGNOSIS — M76.822 POSTERIOR TIBIAL TENDINITIS OF BOTH LOWER EXTREMITIES: ICD-10-CM

## 2021-09-23 PROCEDURE — 73721 MRI JNT OF LWR EXTRE W/O DYE: CPT | Mod: 50

## 2021-09-23 PROCEDURE — 73721 MRI JNT OF LWR EXTRE W/O DYE: CPT | Mod: 26 | Performed by: RADIOLOGY

## 2021-09-24 ENCOUNTER — TELEPHONE (OUTPATIENT)
Dept: PODIATRY | Facility: CLINIC | Age: 61
End: 2021-09-24

## 2021-09-24 ENCOUNTER — MYC MEDICAL ADVICE (OUTPATIENT)
Dept: PODIATRY | Facility: CLINIC | Age: 61
End: 2021-09-24

## 2021-09-24 DIAGNOSIS — M79.671 FOOT PAIN, BILATERAL: Primary | ICD-10-CM

## 2021-09-24 DIAGNOSIS — M79.672 FOOT PAIN, BILATERAL: Primary | ICD-10-CM

## 2021-09-24 DIAGNOSIS — M72.2 PLANTAR FASCIITIS, LEFT: ICD-10-CM

## 2021-09-24 DIAGNOSIS — M19.071 ARTHRITIS OF RIGHT FOOT: ICD-10-CM

## 2021-09-24 NOTE — TELEPHONE ENCOUNTER
Sent the following in  note:    RIGHT FOOT/ANKLE:  MRI does not show any tendon injury or fractures. No plantar fasciitis. It does show some arthritis changes to the middle of the foot.  If pain to the middle of the foot, could try xray guided injection to the area. Let us know if you'd like that ordered.     If your heel pain is continuing, with no signs of plantar fasciitis on MRI, Dr Iqbal is concerned that this could be nerve related from your back, fibromyalgia, or polyneuropathy.  May recommend following up with primary care doctor. They may suggest a pain management referral.     LEFT FOOT/ANKLE:  MRI shows minimal plantar fasciitis, arthritis of the midfoot, and some partial tearing of tibialis tendon but not a complete tear.  Since it is not completely torn, we could try physical therapy and iontophoresis. Otherwise would recommend follow up in clinic to further discuss. Can call [phone] to schedule a follow up or let us know if you'd like a physical therapy referral, and which pharmacy you'd like the medication sent to for the iontophoresis.

## 2021-09-30 RX ORDER — DEXAMETHASONE SODIUM PHOSPHATE 4 MG/ML
4 INJECTION, SOLUTION INTRA-ARTICULAR; INTRALESIONAL; INTRAMUSCULAR; INTRAVENOUS; SOFT TISSUE SEE ADMIN INSTRUCTIONS
Qty: 30 ML | Refills: 0 | Status: SHIPPED | OUTPATIENT
Start: 2021-09-30 | End: 2021-12-03

## 2021-09-30 NOTE — TELEPHONE ENCOUNTER
Reason for call:  Kevon from [facility] requests directions for use to bill insurance.    Other phone number:  [phone]

## 2021-09-30 NOTE — TELEPHONE ENCOUNTER
Orders placed. Patient can call to schedule physical therapy at:   [phone].      Medication for physical therapy sent to [facility] pharmacy. She should pick that up and bring it to her physical therapy appointment.     Please let patient know.     Thanks,    Emiliana Iqbal DPM

## 2021-10-01 ENCOUNTER — TRANSFERRED RECORDS (OUTPATIENT)
Dept: HEALTH INFORMATION MANAGEMENT | Facility: CLINIC | Age: 61
End: 2021-10-01

## 2021-10-04 DIAGNOSIS — N95.1 SYMPTOMATIC MENOPAUSAL OR FEMALE CLIMACTERIC STATES: ICD-10-CM

## 2021-10-05 RX ORDER — ESTRADIOL 0.05 MG/D
PATCH, EXTENDED RELEASE TRANSDERMAL
Qty: 8 PATCH | Refills: 1 | Status: SHIPPED | OUTPATIENT
Start: 2021-10-05 | End: 2021-12-23

## 2021-10-24 ENCOUNTER — HEALTH MAINTENANCE LETTER (OUTPATIENT)
Age: 61
End: 2021-10-24

## 2021-10-26 ENCOUNTER — TRANSFERRED RECORDS (OUTPATIENT)
Dept: HEALTH INFORMATION MANAGEMENT | Facility: CLINIC | Age: 61
End: 2021-10-26
Payer: COMMERCIAL

## 2021-11-18 ENCOUNTER — THERAPY VISIT (OUTPATIENT)
Dept: PHYSICAL THERAPY | Facility: CLINIC | Age: 61
End: 2021-11-18
Payer: COMMERCIAL

## 2021-11-18 DIAGNOSIS — G89.29 CHRONIC PAIN OF BOTH KNEES: ICD-10-CM

## 2021-11-18 DIAGNOSIS — M79.672 FOOT PAIN, BILATERAL: ICD-10-CM

## 2021-11-18 DIAGNOSIS — M25.561 CHRONIC PAIN OF BOTH KNEES: ICD-10-CM

## 2021-11-18 DIAGNOSIS — M79.671 FOOT PAIN, BILATERAL: ICD-10-CM

## 2021-11-18 DIAGNOSIS — M19.071 ARTHRITIS OF RIGHT FOOT: ICD-10-CM

## 2021-11-18 DIAGNOSIS — M25.562 CHRONIC PAIN OF BOTH KNEES: ICD-10-CM

## 2021-11-18 DIAGNOSIS — M72.2 PLANTAR FASCIITIS, LEFT: ICD-10-CM

## 2021-11-18 PROCEDURE — 97110 THERAPEUTIC EXERCISES: CPT | Mod: GP | Performed by: PHYSICAL THERAPIST

## 2021-11-18 PROCEDURE — 97161 PT EVAL LOW COMPLEX 20 MIN: CPT | Mod: GP | Performed by: PHYSICAL THERAPIST

## 2021-11-18 ASSESSMENT — ACTIVITIES OF DAILY LIVING (ADL)
STAND: ACTIVITY IS MINIMALLY DIFFICULT
KNEE_ACTIVITY_OF_DAILY_LIVING_SCORE: 68.57
KNEEL ON THE FRONT OF YOUR KNEE: ACTIVITY IS SOMEWHAT DIFFICULT
RAW_SCORE: 48
HOW_WOULD_YOU_RATE_THE_OVERALL_FUNCTION_OF_YOUR_KNEE_DURING_YOUR_USUAL_DAILY_ACTIVITIES?: NEARLY NORMAL
AS_A_RESULT_OF_YOUR_KNEE_INJURY,_HOW_WOULD_YOU_RATE_YOUR_CURRENT_LEVEL_OF_DAILY_ACTIVITY?: NEARLY NORMAL
LIMPING: I DO NOT HAVE THE SYMPTOM
GO DOWN STAIRS: ACTIVITY IS SOMEWHAT DIFFICULT
SQUAT: ACTIVITY IS SOMEWHAT DIFFICULT
WEAKNESS: I HAVE THE SYMPTOM BUT IT DOES NOT AFFECT MY ACTIVITY
RISE FROM A CHAIR: ACTIVITY IS SOMEWHAT DIFFICULT
PAIN: THE SYMPTOM AFFECTS MY ACTIVITY SLIGHTLY
WALK: ACTIVITY IS MINIMALLY DIFFICULT
GIVING WAY, BUCKLING OR SHIFTING OF KNEE: THE SYMPTOM AFFECTS MY ACTIVITY SLIGHTLY
GO UP STAIRS: ACTIVITY IS SOMEWHAT DIFFICULT
SWELLING: I HAVE THE SYMPTOM BUT IT DOES NOT AFFECT MY ACTIVITY
STIFFNESS: THE SYMPTOM AFFECTS MY ACTIVITY SLIGHTLY
HOW_WOULD_YOU_RATE_THE_CURRENT_FUNCTION_OF_YOUR_KNEE_DURING_YOUR_USUAL_DAILY_ACTIVITIES_ON_A_SCALE_FROM_0_TO_100_WITH_100_BEING_YOUR_LEVEL_OF_KNEE_FUNCTION_PRIOR_TO_YOUR_INJURY_AND_0_BEING_THE_INABILITY_TO_PERFORM_ANY_OF_YOUR_USUAL_DAILY_ACTIVITIES?: 80
SIT WITH YOUR KNEE BENT: ACTIVITY IS SOMEWHAT DIFFICULT
KNEE_ACTIVITY_OF_DAILY_LIVING_SUM: 48

## 2021-11-18 NOTE — PROGRESS NOTES
Physical Therapy Initial Evaluation  Subjective:  The history is provided by the patient. No  was used.   Patient Health History  Goldie Paul being seen for B knee pain.     Date of Onset: 1-1.5 years ago.   Problem occurred: unknown   Pain is reported as 4/10 on pain scale.  General health as reported by patient is good.  Pertinent medical history includes: none.   Red flags:  None as reported by patient.  Medical allergies: none.   Surgeries include:  None.    Current medications:  Anti-inflammatory, hormone replacement therapy, pain medication, sleep medication and steroids.    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   Primary job tasks include:  Lifting/carrying, prolonged sitting, prolonged standing, pushing/pulling and repetitive tasks.                  Therapist Generated HPI Evaluation  Problem details: Pt. complains of bilateral knee pain that has been present for greater than 1.5 years.  No known trauma.  PT order dated 11/18/21.      .         Type of problem:  Bilateral knees.    This is a chronic condition.  Condition occurred with:  Insidious onset.  Where condition occurred: for unknown reasons.  Patient reports pain:  Anterior and medial.  and is intermittent.  Pain radiates to:  Knee. Pain is worse during the day.  Since onset symptoms are unchanged.  Associated symptoms:  Loss of motion/stiffness and loss of strength. Symptoms are exacerbated by descending stairs and standing  and relieved by rest.  Special tests included:  X-ray (OA per patient,  images in 9/21 were unremarkable).  Past treatment: injections  There was mild improvement following previous treatment.  Restrictions due to condition include:  Working in normal job without restrictions.  Barriers include:  None as reported by patient.                        Objective:  Standing Alignment:          Pelvic:  Normal  Hip:  Normal  Knee:  Normal          Flexibility/Screens:   Positive screens:   KneeNegative screens: Hip                                                      Hip Evaluation  HIP AROM:  AROM:    Left Hip:     Normal    Right Hip:   Normal                  Hip PROM:  Hip PROM:  Left Hip:    Normal  Right Hip:  Normal                          Hip Strength:    Flexion:   Left: 4/5   -  Pain:  Right: 4/5   -  Pain:                    Extension:  Left: 4/5  -  Pain:Right: 4/5    -  Pain:    Abduction:  Left: 4/5    -   Pain:Right: 4/5   -   Pain:                           Knee Evaluation:  ROM:  AROM: normal  PROM: normal            Strength:     Extension:  Left: 3+/5    Pain:+/-      Right: 3+/5    Pain:+/-  Flexion:  Left: 4/5    Pain:-      Right: 4/5    Pain:-    Quad Set Left:  Fair    Pain: -   Quad Set Right:  Fair    Pain: -  Ligament Testing:  Normal                Special Tests: Normal      Palpation:    Left knee tenderness present at:  Medial Joint Line; Patellar Tendon; Patellar Medial; Patellar Superior and Patellar Inferior  Left knee tenderness not present at:  Lateral Joint Line and Patellar Lateral  Right knee tenderness present at:  Medial Joint Line; Patellar Tendon; Patellar Medial; Patellar Superior and Patellar Inferior  Right knee tenderness not present at:  Lateral Joint Line and Patellar Lateral  Edema:  Normal    Mobility Testing:  Normal                  General     ROS    Assessment/Plan:    Patient is a 61 year old female with both sides knee complaints.    Patient has the following significant findings with corresponding treatment plan.                Diagnosis 1:  B knee pain   Pain -  hot/cold therapy, self management, education, directional preference exercise and home program  Decreased strength - therapeutic exercise and therapeutic activities  Impaired muscle performance - neuro re-education  Decreased function - therapeutic activities    Therapy Evaluation Codes:   1) Clinical presentation characteristics are:   Stable/Uncomplicated.  2) Decision-Making    Low  complexity using standardized patient assessment instrument and/or measureable assessment of functional outcome.  Cumulative Therapy Evaluation is: Low complexity.    Previous and current functional limitations:  (See Goal Flow Sheet for this information)    Short term and Long term goals: (See Goal Flow Sheet for this information)     Communication ability:  Patient appears to be able to clearly communicate and understand verbal and written communication and follow directions correctly.  Treatment Explanation - The following has been discussed with the patient:   RX ordered/plan of care  Anticipated outcomes  Possible risks and side effects  This patient would benefit from PT intervention to resume normal activities.   Rehab potential is good.    Frequency:  1 X week, once daily  Duration:  for 6 weeks  Discharge Plan:  Achieve all LTG.  Independent in home treatment program.  Reach maximal therapeutic benefit.    Please refer to the daily flowsheet for treatment today, total treatment time and time spent performing 1:1 timed codes.

## 2021-11-30 ENCOUNTER — THERAPY VISIT (OUTPATIENT)
Dept: PHYSICAL THERAPY | Facility: CLINIC | Age: 61
End: 2021-11-30
Payer: COMMERCIAL

## 2021-11-30 DIAGNOSIS — M25.562 CHRONIC PAIN OF BOTH KNEES: ICD-10-CM

## 2021-11-30 DIAGNOSIS — M25.561 CHRONIC PAIN OF BOTH KNEES: ICD-10-CM

## 2021-11-30 DIAGNOSIS — G89.29 CHRONIC PAIN OF BOTH KNEES: ICD-10-CM

## 2021-11-30 PROCEDURE — 97530 THERAPEUTIC ACTIVITIES: CPT | Mod: GP | Performed by: PHYSICAL THERAPIST

## 2021-11-30 PROCEDURE — 97110 THERAPEUTIC EXERCISES: CPT | Mod: GP | Performed by: PHYSICAL THERAPIST

## 2021-12-02 ENCOUNTER — E-VISIT (OUTPATIENT)
Dept: FAMILY MEDICINE | Facility: CLINIC | Age: 61
End: 2021-12-02
Payer: COMMERCIAL

## 2021-12-02 DIAGNOSIS — H92.09 OTALGIA, UNSPECIFIED LATERALITY: Primary | ICD-10-CM

## 2021-12-02 PROCEDURE — 99207 PR NON-BILLABLE SERV PER CHARTING: CPT | Performed by: PHYSICIAN ASSISTANT

## 2021-12-03 ENCOUNTER — OFFICE VISIT (OUTPATIENT)
Dept: URGENT CARE | Facility: URGENT CARE | Age: 61
End: 2021-12-03
Payer: COMMERCIAL

## 2021-12-03 VITALS
DIASTOLIC BLOOD PRESSURE: 78 MMHG | HEART RATE: 93 BPM | SYSTOLIC BLOOD PRESSURE: 120 MMHG | TEMPERATURE: 98 F | OXYGEN SATURATION: 95 %

## 2021-12-03 DIAGNOSIS — J01.90 ACUTE SINUSITIS WITH SYMPTOMS > 10 DAYS: Primary | ICD-10-CM

## 2021-12-03 LAB
DEPRECATED S PYO AG THROAT QL EIA: NEGATIVE
GROUP A STREP BY PCR: NOT DETECTED

## 2021-12-03 PROCEDURE — 99213 OFFICE O/P EST LOW 20 MIN: CPT | Performed by: PHYSICIAN ASSISTANT

## 2021-12-03 PROCEDURE — 87651 STREP A DNA AMP PROBE: CPT | Performed by: PHYSICIAN ASSISTANT

## 2021-12-03 NOTE — PROGRESS NOTES
Assessment & Plan     1. Acute sinusitis with symptoms > 10 days  She appears to have a secondary sinus infection from COVID infection, never fully improved over the past 6 weeks. Has some tenderness in frontal sinuses.   Advised fluids and rest. Medication as prescribed.   - Streptococcus A Rapid Screen w/Reflex to PCR  - Group A Streptococcus PCR Throat Swab  - amoxicillin-clavulanate (AUGMENTIN) 875-125 MG tablet; Take 1 tablet by mouth 2 times daily for 7 days  Dispense: 14 tablet; Refill: 0      Return in about 1 week (around 12/10/2021), or if symptoms worsen or fail to improve.    Diagnosis and treatment plan was reviewed with patient and/or family.   We went over any labs or imaging. Discussed worsening symptoms or little to no relief despite treatment plan to follow-up with PCP or return to clinic.  Patient verbalizes understanding. All questions were addressed and answered.     Jodie Christianson PA-C  [facility]    CHIEF COMPLAINT:   Chief Complaint   Patient presents with     Urgent Care     R EAR ACHE AND SORE THROAT WENT AWAY AND CAME BACK 2 DAYS AGO, HAD COVID 6 WEEEKS AGO ITS BEEN OFF AND ON      Subjective     Goldie is a 61 year old female who presents to clinic today for evaluation. She was diagnosed with COVID19 in November. Since that time, she continues to have congestion and sore throat. She feels some post nasal drainage. Sore throat feels worse over the past couple of days. No fever or chills.       Past Medical History:   Diagnosis Date     Allergic rhinitis, cause unspecified      Disorders of bursae and tendons in shoulder region, unspecified 1/06    right     Endometriosis of other specified sites      Esophageal reflux      FIBROMYALGIA     Neg Rheum work-up     Hyperlipidemia      Impingement syndrome of left shoulder     s/p cortisone injection     Insomnia 12/23/2011     Lateral epicondylitis  of elbow 2/06    left     Plantar fascial fibromatosis       Polyneuropathy in other diseases classified elsewhere (H)      Radial styloid tenosynovitis     bilateral     Seborrheic dermatitis      Sprain of hand, unspecified site 1/06    right thumb MCP joint     Uncomplicated asthma      Past Surgical History:   Procedure Laterality Date     ABDOMEN SURGERY  1992    hysterectomy     APPENDECTOMY  1977     [facility] NONSPECIFIC PROCEDURE  2004    Plantar fasciotomy     [facility] NONSPECIFIC PROCEDURE  1992    DAVID, BSO for endometriosis     Social History     Tobacco Use     Smoking status: Never Smoker     Smokeless tobacco: Never Used   Substance Use Topics     Alcohol use: No     Alcohol/week: 0.0 standard drinks     Current Outpatient Medications   Medication     amoxicillin-clavulanate (AUGMENTIN) 875-125 MG tablet     ciclopirox (LOPROX) 0.77 % cream     cyclobenzaprine (FLEXERIL) 5 MG tablet     DULoxetine (CYMBALTA) 30 MG capsule     estradiol (ESTRACE) 0.1 MG/GM vaginal cream     estradiol (VIVELLE-DOT) 0.05 MG/24HR bi-weekly patch     melatonin 3 MG tablet     order for DME     pregabalin (LYRICA) 75 MG capsule     No current facility-administered medications for this visit.     No Known Allergies    10 point ROS of systems were all negative except for pertinent positives noted in my HPI.      Exam:   /78   Pulse 93   Temp 98  F (36.7  C)   SpO2 95%   Constitutional: healthy, alert and no distress  Head: Normocephalic, atraumatic.  Eyes: conjunctiva clear, no drainage  ENT: TMs clear and shiny ganesh, nasal mucosa pink and moist, throat without tonsillar hypertrophy or erythema  Neck: neck is supple, no cervical lymphadenopathy or nuchal rigidity  Cardiovascular: RRR  Respiratory: CTA bilaterally, no rhonchi or rales  Skin: no rashes  Neurologic: Speech clear, gait normal. Moves all extremities.    Results for orders placed or performed in visit on 12/03/21   Streptococcus A Rapid Screen w/Reflex to PCR     Status: Normal    Specimen: Throat; Swab   Result Value Ref Range     Group A Strep antigen Negative Negative

## 2021-12-03 NOTE — PATIENT INSTRUCTIONS
Thank you for choosing us for your care. I think an in-clinic visit would be best next steps based on your symptoms. Please schedule a clinic appointment; you won t be charged for this eVisit.      You can schedule an appointment right here in [facility], or call [phone]

## 2021-12-04 ENCOUNTER — NURSE TRIAGE (OUTPATIENT)
Dept: NURSING | Facility: CLINIC | Age: 61
End: 2021-12-04
Payer: COMMERCIAL

## 2021-12-04 NOTE — TELEPHONE ENCOUNTER
Caller reports her lips started swelling  during night after taking first dose of  Augmentin and it persisits today; no breathing problems, rash or other swelling.   Took  Benadryl  today with some relief.   Advised to stop augmentin  Triage protocol reviewed   Contacted on call provider regarding  possible allergic reaction to antibiotic and need for further treatment     Discussed with Dr. Claros on call for  [facility]:   Advised hold  off antibiotic at this point and treat  sinus congestion with OTC  decongestant  and sore throat with home care   Follow up with PCP if not improving on home care or develops worsening symptoms   Caller understands and will comply   Nat Clarke RN  FNA                                                               Reason for Disposition    [1] Severe swelling AND [2] cause unknown    Additional Information    Negative: Unresponsive, passed out or very weak    Negative: Swollen tongue    Negative: Difficulty breathing or wheezing    Negative: [1] Life-threatening reaction in the past to similar substance (e.g., food, insect bite/sting, chemical, etc.) AND [2] < 2 hours since exposure    Negative: Sounds like a life-threatening emergency to the triager    Negative: Followed a lip injury    Negative: Entire face is swollen    Negative: Followed an insect bite to the area    Negative: Cold sore suspected (sore on outer lip)    Negative: Mouth sores or ulcers on inner lip    Negative: [1] Contact with a chemical AND [2] some may have been swallowed    Negative: [1] Contact with a chemical AND [2] none entered the mouth    Negative: Taking an ACE Inhibitor medication  (e.g., benazepril/LOTENSIN, captopril/CAPOTEN, enalapril/VASOTEC, lisinopril/ZESTRIL)    Protocols used: LIP SWELLING-A-AH

## 2021-12-17 ENCOUNTER — THERAPY VISIT (OUTPATIENT)
Dept: PHYSICAL THERAPY | Facility: CLINIC | Age: 61
End: 2021-12-17
Payer: COMMERCIAL

## 2021-12-17 DIAGNOSIS — G89.29 CHRONIC PAIN OF BOTH KNEES: ICD-10-CM

## 2021-12-17 DIAGNOSIS — M25.561 CHRONIC PAIN OF BOTH KNEES: ICD-10-CM

## 2021-12-17 DIAGNOSIS — M25.562 CHRONIC PAIN OF BOTH KNEES: ICD-10-CM

## 2021-12-17 PROCEDURE — 97530 THERAPEUTIC ACTIVITIES: CPT | Mod: GP | Performed by: PHYSICAL THERAPIST

## 2021-12-17 PROCEDURE — 97110 THERAPEUTIC EXERCISES: CPT | Mod: GP | Performed by: PHYSICAL THERAPIST

## 2021-12-17 ASSESSMENT — ACTIVITIES OF DAILY LIVING (ADL)
GO UP STAIRS: ACTIVITY IS SOMEWHAT DIFFICULT
STAND: ACTIVITY IS MINIMALLY DIFFICULT
GO DOWN STAIRS: ACTIVITY IS SOMEWHAT DIFFICULT
RISE FROM A CHAIR: ACTIVITY IS SOMEWHAT DIFFICULT
AS_A_RESULT_OF_YOUR_KNEE_INJURY,_HOW_WOULD_YOU_RATE_YOUR_CURRENT_LEVEL_OF_DAILY_ACTIVITY?: NEARLY NORMAL
KNEE_ACTIVITY_OF_DAILY_LIVING_SCORE: 74.29
SQUAT: ACTIVITY IS SOMEWHAT DIFFICULT
KNEE_ACTIVITY_OF_DAILY_LIVING_SUM: 52
LIMPING: I DO NOT HAVE THE SYMPTOM
SWELLING: I DO NOT HAVE THE SYMPTOM
RAW_SCORE: 52
SIT WITH YOUR KNEE BENT: ACTIVITY IS SOMEWHAT DIFFICULT
WALK: ACTIVITY IS NOT DIFFICULT
STIFFNESS: I HAVE THE SYMPTOM BUT IT DOES NOT AFFECT MY ACTIVITY
PAIN: THE SYMPTOM AFFECTS MY ACTIVITY SLIGHTLY
KNEEL ON THE FRONT OF YOUR KNEE: ACTIVITY IS SOMEWHAT DIFFICULT
GIVING WAY, BUCKLING OR SHIFTING OF KNEE: I DO NOT HAVE THE SYMPTOM
WEAKNESS: THE SYMPTOM AFFECTS MY ACTIVITY SLIGHTLY
HOW_WOULD_YOU_RATE_THE_CURRENT_FUNCTION_OF_YOUR_KNEE_DURING_YOUR_USUAL_DAILY_ACTIVITIES_ON_A_SCALE_FROM_0_TO_100_WITH_100_BEING_YOUR_LEVEL_OF_KNEE_FUNCTION_PRIOR_TO_YOUR_INJURY_AND_0_BEING_THE_INABILITY_TO_PERFORM_ANY_OF_YOUR_USUAL_DAILY_ACTIVITIES?: 60
HOW_WOULD_YOU_RATE_THE_OVERALL_FUNCTION_OF_YOUR_KNEE_DURING_YOUR_USUAL_DAILY_ACTIVITIES?: NEARLY NORMAL

## 2021-12-21 ENCOUNTER — THERAPY VISIT (OUTPATIENT)
Dept: PHYSICAL THERAPY | Facility: CLINIC | Age: 61
End: 2021-12-21
Payer: COMMERCIAL

## 2021-12-21 DIAGNOSIS — M25.561 CHRONIC PAIN OF BOTH KNEES: ICD-10-CM

## 2021-12-21 DIAGNOSIS — G89.29 CHRONIC PAIN OF BOTH KNEES: ICD-10-CM

## 2021-12-21 DIAGNOSIS — M25.562 CHRONIC PAIN OF BOTH KNEES: ICD-10-CM

## 2021-12-21 PROCEDURE — 97530 THERAPEUTIC ACTIVITIES: CPT | Mod: GP | Performed by: PHYSICAL THERAPIST

## 2021-12-21 PROCEDURE — 97110 THERAPEUTIC EXERCISES: CPT | Mod: GP | Performed by: PHYSICAL THERAPIST

## 2021-12-21 NOTE — PROGRESS NOTES
DISCHARGE REPORT    Progress reporting period is from eval to 12/21/21.       SUBJECTIVE  Subjective changes noted by patient:  .  Subjective: Continued improvement.  Functioning at 75% of whre she wants to be.  She feels confident continueing independently.    Current pain level is  Current Pain level: 2/10.     Previous pain level was   Initial Pain level: 4/10.   Changes in function:  Yes (See Goal flowsheet attached for changes in current functional level)  Adverse reaction to treatment or activity: None    OBJECTIVE  Changes noted in objective findings:  Yes,   Objective: ROM WNL.  Strength 4/5.       ASSESSMENT/PLAN  Updated problem list and treatment plan: Diagnosis 1:  B knee pain  Decreased strength - therapeutic exercise and therapeutic activities  Impaired gait - gait training  Decreased function - therapeutic activities  STG/LTGs have been met or progress has been made towards goals:  Yes (See Goal flow sheet completed today.)  Assessment of Progress: The patient's condition is improving.  The patient has met all of their long term goals.  Self Management Plans:  Patient has been instructed in a home treatment program.  Patient is independent in a home treatment program.  I have re-evaluated this patient and find that the nature, scope, duration and intensity of the therapy is appropriate for the medical condition of the patient.      Recommendations:  This patient is ready to be discharged from therapy and continue their home treatment program.    Please refer to the daily flowsheet for treatment today, total treatment time and time spent performing 1:1 timed codes.

## 2021-12-22 DIAGNOSIS — N95.1 SYMPTOMATIC MENOPAUSAL OR FEMALE CLIMACTERIC STATES: ICD-10-CM

## 2021-12-23 RX ORDER — ESTRADIOL 0.05 MG/D
PATCH, EXTENDED RELEASE TRANSDERMAL
Qty: 24 PATCH | Refills: 3 | Status: SHIPPED | OUTPATIENT
Start: 2021-12-23 | End: 2022-12-25

## 2021-12-30 DIAGNOSIS — M79.7 FIBROMYALGIA: ICD-10-CM

## 2021-12-31 RX ORDER — CYCLOBENZAPRINE HCL 5 MG
TABLET ORAL
Qty: 180 TABLET | Refills: 1 | Status: SHIPPED | OUTPATIENT
Start: 2021-12-31 | End: 2023-10-02

## 2021-12-31 NOTE — CONFIDENTIAL NOTE
Flexeril        Last written prescription date: 5.2.20        Last fill quantity: 60, # refills: 11        Last office visit: 12.2.21        Future office visit: no             Routing refill request to provider for review/approval because: Drug not on the [facility], [facility] or [facility] refill protocol or controlled substance

## 2022-02-13 ENCOUNTER — HEALTH MAINTENANCE LETTER (OUTPATIENT)
Age: 62
End: 2022-02-13

## 2022-04-09 DIAGNOSIS — M79.7 FIBROMYALGIA: ICD-10-CM

## 2022-04-10 RX ORDER — PREGABALIN 75 MG/1
CAPSULE ORAL
Qty: 90 CAPSULE | Refills: 5 | Status: SHIPPED | OUTPATIENT
Start: 2022-04-10 | End: 2022-10-11

## 2022-04-20 ENCOUNTER — TRANSFERRED RECORDS (OUTPATIENT)
Dept: HEALTH INFORMATION MANAGEMENT | Facility: CLINIC | Age: 62
End: 2022-04-20
Payer: COMMERCIAL

## 2022-06-05 ENCOUNTER — HEALTH MAINTENANCE LETTER (OUTPATIENT)
Age: 62
End: 2022-06-05

## 2022-06-29 ENCOUNTER — OFFICE VISIT (OUTPATIENT)
Dept: FAMILY MEDICINE | Facility: CLINIC | Age: 62
End: 2022-06-29
Payer: COMMERCIAL

## 2022-06-29 VITALS
HEIGHT: 63 IN | WEIGHT: 130.6 LBS | OXYGEN SATURATION: 97 % | HEART RATE: 92 BPM | SYSTOLIC BLOOD PRESSURE: 102 MMHG | RESPIRATION RATE: 16 BRPM | DIASTOLIC BLOOD PRESSURE: 72 MMHG | BODY MASS INDEX: 23.14 KG/M2 | TEMPERATURE: 98.3 F

## 2022-06-29 DIAGNOSIS — M79.7 FIBROMYALGIA: ICD-10-CM

## 2022-06-29 DIAGNOSIS — G89.4 CHRONIC PAIN SYNDROME: ICD-10-CM

## 2022-06-29 DIAGNOSIS — Z11.4 SCREENING FOR HIV (HUMAN IMMUNODEFICIENCY VIRUS): ICD-10-CM

## 2022-06-29 DIAGNOSIS — Z80.3 FAMILY HISTORY OF MALIGNANT NEOPLASM OF BREAST: ICD-10-CM

## 2022-06-29 DIAGNOSIS — Z00.00 ROUTINE GENERAL MEDICAL EXAMINATION AT A HEALTH CARE FACILITY: Primary | ICD-10-CM

## 2022-06-29 PROBLEM — Z90.721 S/P HYSTERECTOMY WITH OOPHORECTOMY: Status: ACTIVE | Noted: 2017-01-03

## 2022-06-29 PROBLEM — K63.5 POLYP OF COLON: Status: ACTIVE | Noted: 2021-10-01

## 2022-06-29 PROBLEM — Z90.710 S/P HYSTERECTOMY WITH OOPHORECTOMY: Status: ACTIVE | Noted: 2017-01-03

## 2022-06-29 PROCEDURE — 99396 PREV VISIT EST AGE 40-64: CPT | Performed by: PHYSICIAN ASSISTANT

## 2022-06-29 ASSESSMENT — ENCOUNTER SYMPTOMS
JOINT SWELLING: 0
WEAKNESS: 0
DIARRHEA: 0
ARTHRALGIAS: 0
PARESTHESIAS: 0
HEARTBURN: 0
CONSTIPATION: 0
DYSURIA: 0
NERVOUS/ANXIOUS: 0
FEVER: 0
SORE THROAT: 0
BREAST MASS: 0
EYE PAIN: 0
CHILLS: 0
PALPITATIONS: 0
HEADACHES: 0
ABDOMINAL PAIN: 0
DIZZINESS: 0
HEMATURIA: 0
NAUSEA: 0
COUGH: 0
MYALGIAS: 0
SHORTNESS OF BREATH: 0
FREQUENCY: 0
HEMATOCHEZIA: 0

## 2022-06-29 NOTE — PROGRESS NOTES
SUBJECTIVE:   CC: Goldie Paul is an 61 year old woman who presents for preventive health visit.     Patient has been advised of split billing requirements and indicates understanding: Yes     Healthy Habits:     Getting at least 3 servings of Calcium per day:  Yes    Bi-annual eye exam:  Yes    Dental care twice a year:  Yes    Sleep apnea or symptoms of sleep apnea:  Excessive snoring    Diet:  Regular (no restrictions) and Low salt    Frequency of exercise:  1 day/week    Duration of exercise:  Less than 15 minutes    Taking medications regularly:  Yes    Medication side effects:  None    PHQ-2 Total Score: 0    Additional concerns today:  No    Concerns regarding hair loss - overall thinning over the past year. No patches of hair loss. Takes vitamin D, biotin.     [facility] - following for fibromyalgia and hormones. Had labs completed with [facility] and will be following up with them soon. On progesterone. Was on testosterone but stopped it because he felt like her hair was falling out more.     Fibromyalgia - follows with PCP and will continue to follow with him. Taking pregabalin and duloxetine. Feels stable.     Her sister has a history of breast cancer, diagnosed at age 44 - she did not do genetic screening. Goldie thinks her other sister did do genetic screening and thinks she said she has the BRCA gene but she's not positive. Goldie is not interested in genetic counseling, states she has annual mammograms and they have been normal.    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.    Today's PHQ-2 Score:   PHQ-2 ( 1999 Pfizer) 6/29/2022   Q1: Little interest or pleasure in doing things 0   Q2: Feeling down, depressed or hopeless 0   PHQ-2 Score 0   PHQ-2 Total Score (12-17 Years)- Positive if 3 or more points; Administer PHQ-A if positive -   Q1: Little interest or pleasure in doing things Not at all   Q2: Feeling down, depressed or hopeless Not at all   PHQ-2 Score 0       Abuse: Current or Past (Physical,  Sexual or Emotional) - No  Do you feel safe in your environment? Yes        Social History     Tobacco Use     Smoking status: Never Smoker     Smokeless tobacco: Never Used   Substance Use Topics     Alcohol use: No     If you drink alcohol do you typically have >3 drinks per day or >7 drinks per week? Not applicable    Alcohol Use 6/29/2022   Prescreen: >3 drinks/day or >7 drinks/week? Not Applicable   Prescreen: >3 drinks/day or >7 drinks/week? -       Reviewed orders with patient.  Reviewed health maintenance and updated orders accordingly - Yes  Labs reviewed in EPIC  BP Readings from Last 3 Encounters:   06/29/22 102/72   12/03/21 120/78   08/25/21 110/72    Wt Readings from Last 3 Encounters:   06/29/22 59.2 kg (130 lb 9.6 oz)   08/25/21 59.9 kg (132 lb)   06/17/21 60.3 kg (133 lb)                  Patient Active Problem List   Diagnosis     Plantar fascial fibromatosis     Allergic rhinitis     Other specified idiopathic peripheral neuropathy     Chronic pain syndrome     Gastroesophageal reflux disease without esophagitis     Fibromyalgia     Insomnia, unspecified insomnia     Seborrheic dermatitis     Hyperlipidemia LDL goal <100     Restless leg syndrome     Cervical radiculopathy     Polyp of colon     S/P hysterectomy with oophorectomy     Family history of malignant neoplasm of breast     Past Surgical History:   Procedure Laterality Date     ABDOMEN SURGERY  01/01/1992    hysterectomy     APPENDECTOMY  01/01/1977     COLONOSCOPY  2018     SOFT TISSUE SURGERY       [facility] NONSPECIFIC PROCEDURE  01/01/2004    Plantar fasciotomy     [facility] NONSPECIFIC PROCEDURE  01/01/1992    DAVID, BSO for endometriosis       Social History     Tobacco Use     Smoking status: Never Smoker     Smokeless tobacco: Never Used   Substance Use Topics     Alcohol use: No     Family History   Problem Relation Age of Onset     Hypertension Mother      Coronary Artery Disease Father      Parkinsonism Father      Coronary Stenting Father       Breast Cancer Sister         dx age 44     Diabetes No family hx of      Colon Cancer No family hx of      Ovarian Cancer No family hx of          Current Outpatient Medications   Medication Sig Dispense Refill     ciclopirox (LOPROX) 0.77 % cream Apply topically daily 30 g 6     cyclobenzaprine (FLEXERIL) 5 MG tablet TAKE ONE OR TWO TABLETS BY MOUTH once nightly AT BEDTIME AS NEEDED for back pain or spasms 180 tablet 1     DULoxetine (CYMBALTA) 30 MG capsule Take 1 capsule (30 mg) by mouth 2 times daily 180 capsule 3     estradiol (ESTRACE) 0.1 MG/GM vaginal cream Place 1 g vaginally twice a week Use daily for the first 14 days 42.5 g 2     estradiol (VIVELLE-DOT) 0.05 MG/24HR bi-weekly patch APPLY 1 PATCH  EXTERNALLY TO CLEAN, DRY, HAIRLESS SKIN BY TRANSDERMAL ROUTE TWICE WEEKLY. REMOVE OLD PATCH BEFORE APPLYING A NEW ONE. 24 patch 3     melatonin 3 MG tablet Take 1 tablet (3 mg) by mouth nightly as needed for sleep 100 tablet 3     order for DME Orthotics for both feet 1 Package 2     pregabalin (LYRICA) 75 MG capsule TAKE 1 CAPSULE (75 mg) BY MOUTH THREE TIMES DAILY 90 capsule 5     No Known Allergies  Recent Labs   Lab Test 06/17/21  1834 09/01/20  1020 12/05/19  0751 07/18/19  0847 06/05/18  1533   A1C  --  5.5  --   --   --    LDL  --  132* 164* 144*  --    HDL  --  69 57 57  --    TRIG  --  70 113 105  --    ALT 50  --  44 58* 56*   CR 0.64 0.70 0.61 0.60 0.56   GFRESTIMATED >90 >90 >90 >90 >90   GFRESTBLACK >90 >90 >90 >90 >90   POTASSIUM 4.2 4.3 4.0 4.2 3.9   TSH  --   --   --  2.36 1.14        Breast Cancer Screening:  Any new diagnosis of family breast, ovarian, or bowel cancer? Yes     FHS-7:   Breast CA Risk Assessment (FHS-7) 6/29/2022 6/29/2022 6/29/2022   Did any of your first-degree relatives have breast or ovarian cancer? Yes Yes Yes   Did any of your relatives have bilateral breast cancer? Yes Yes Unknown   Did any man in your family have breast cancer? - No No   Did any woman in your family  have breast and ovarian cancer? - No No   Did any woman in your family have breast cancer before age 50 y? - Yes Yes   Do you have 2 or more relatives with breast and/or ovarian cancer? - No No   Do you have 2 or more relatives with breast and/or bowel cancer? - No No       Mammogram Screening: Recommended mammography every 1-2 years with patient discussion and risk factor consideration  Pertinent mammograms are reviewed under the imaging tab.    History of abnormal Pap smear: Status post benign hysterectomy. Health Maintenance and Surgical History updated.  PAP / HPV 4/2/2014 12/1/2008 3/29/2007   PAP (Historical) NIL ASC-US(A) NIL     Reviewed and updated as needed this visit by clinical staff   Tobacco  Allergies  Meds  Problems  Med Hx  Surg Hx  Fam Hx  Soc   Hx          Reviewed and updated as needed this visit by Provider   Tobacco  Allergies  Meds  Problems  Med Hx  Surg Hx  Fam Hx           Past Medical History:   Diagnosis Date     Allergic rhinitis, cause unspecified      Disorders of bursae and tendons in shoulder region, unspecified 01/2006    right     Endometriosis of other specified sites      Esophageal reflux      FIBROMYALGIA     Neg Rheum work-up     Hyperlipidemia      Impingement syndrome of left shoulder     s/p cortisone injection     Insomnia 12/23/2011     Lateral epicondylitis  of elbow 02/2006    left     Plantar fascial fibromatosis      Polyneuropathy in other diseases classified elsewhere (H)      Radial styloid tenosynovitis     bilateral     Seborrheic dermatitis      Sprain of hand, unspecified site 01/2006    right thumb MCP joint     Uncomplicated asthma       Past Surgical History:   Procedure Laterality Date     ABDOMEN SURGERY  01/01/1992    hysterectomy     APPENDECTOMY  01/01/1977     COLONOSCOPY  2018     SOFT TISSUE SURGERY       [facility] NONSPECIFIC PROCEDURE  01/01/2004    Plantar fasciotomy     [facility] NONSPECIFIC PROCEDURE  01/01/1992    DAVID, BSO for endometriosis  "    OB History    Para Term  AB Living   2 2 2 0 0 2   SAB IAB Ectopic Multiple Live Births   0 0 0 0 2      # Outcome Date GA Lbr Thomas/2nd Weight Sex Delivery Anes PTL Lv   2 Term 92    M   N BRITTANI   1 Term 89    F CS-Unspec  N BRITTANI       Review of Systems   Constitutional: Negative for chills and fever.   HENT: Negative for congestion, ear pain, hearing loss and sore throat.    Eyes: Negative for pain and visual disturbance.   Respiratory: Negative for cough and shortness of breath.    Cardiovascular: Negative for chest pain, palpitations and peripheral edema.   Gastrointestinal: Negative for abdominal pain, constipation, diarrhea, heartburn, hematochezia and nausea.   Breasts:  Negative for tenderness, breast mass and discharge.   Genitourinary: Positive for pelvic pain (noticed some discomfort last week that has since resolved). Negative for dysuria, frequency, genital sores, hematuria, urgency, vaginal bleeding and vaginal discharge.   Musculoskeletal: Negative for arthralgias, joint swelling and myalgias.   Skin: Negative for rash.   Neurological: Negative for dizziness, weakness, headaches and paresthesias.   Psychiatric/Behavioral: Negative for mood changes. The patient is not nervous/anxious.        OBJECTIVE:   /72 (BP Location: Right arm, Patient Position: Chair, Cuff Size: Adult Regular)   Pulse 92   Temp 98.3  F (36.8  C) (Oral)   Resp 16   Ht 1.588 m (5' 2.5\")   Wt 59.2 kg (130 lb 9.6 oz)   SpO2 97%   BMI 23.51 kg/m    Physical Exam  GENERAL: healthy, alert and no distress  EYES: Eyes grossly normal to inspection, PERRL and conjunctivae and sclerae normal  HENT: ear canals and TM's normal, nose and mouth without ulcers or lesions  NECK: no adenopathy, no asymmetry, masses, or scars and thyroid normal to palpation  RESP: lungs clear to auscultation - no rales, rhonchi or wheezes  BREAST: normal without masses, tenderness or nipple discharge and no palpable axillary " masses or adenopathy  CV: regular rate and rhythm, normal S1 S2, no S3 or S4, no murmur, click or rub, no peripheral edema and peripheral pulses strong  ABDOMEN: soft, nontender, no hepatosplenomegaly, no masses and bowel sounds normal  MS: no gross musculoskeletal defects noted, no edema  SKIN: no suspicious lesions or rashes  NEURO: Normal strength and tone, mentation intact and speech normal  PSYCH: mentation appears normal, affect normal/bright    Diagnostic Test Results:  Labs reviewed in Epic    ASSESSMENT/PLAN:   Routine general medical examination at a health care facility  Reviewed personal and family history. Reviewed age appropriate screenings. Reviewed healthy BP and BMI ranges. Counseled on lifestyle modifications for optimal mental and physical health.  Discussed age-appropriate health maintenance. Recommended any needed vaccinations. Continue to focus on well balanced diet and exercise. Does not want COVID booster today but will get it another day. She will return for fasting labs.  - Lipid panel reflex to direct LDL Fasting; Future  - Comprehensive metabolic panel (BMP + Alb, Alk Phos, ALT, AST, Total. Bili, TP); Future    Screening for HIV (human immunodeficiency virus)  Discussed, agrees  - HIV Antigen Antibody Combo; Future    Fibromyalgia  Chronic pain syndrome  Following with PCP and [facility]. Reports stable. Does not need any medications filled today.    Family history of malignant neoplasm of breast  Her sister has a history of breast cancer, diagnosed at age 44 - she did not do genetic screening. Goldie thinks her other sister did do genetic screening and thinks she said she has the BRCA gene but she's not positive. Goldie is not interested in genetic counseling, states she has annual mammograms and they have been normal.    COUNSELING:  Reviewed preventive health counseling, as reflected in patient instructions    Estimated body mass index is 23.51 kg/m  as calculated from the following:    " Height as of this encounter: 1.588 m (5' 2.5\").    Weight as of this encounter: 59.2 kg (130 lb 9.6 oz).        She reports that she has never smoked. She has never used smokeless tobacco.      Counseling Resources:  ATP IV Guidelines  Pooled Cohorts Equation Calculator  Breast Cancer Risk Calculator  BRCA-Related Cancer Risk Assessment: FHS-7 Tool  FRAX Risk Assessment  ICSI Preventive Guidelines  Dietary Guidelines for Americans, 2010  USDA's MyPlate  ASA Prophylaxis  Lung CA Screening    [facility]  "

## 2022-07-11 DIAGNOSIS — M79.7 FIBROMYALGIA: ICD-10-CM

## 2022-07-14 RX ORDER — DULOXETIN HYDROCHLORIDE 30 MG/1
30 CAPSULE, DELAYED RELEASE ORAL 2 TIMES DAILY
Qty: 180 CAPSULE | Refills: 0 | Status: SHIPPED | OUTPATIENT
Start: 2022-07-14 | End: 2022-11-10

## 2022-07-25 DIAGNOSIS — E55.9 AVITAMINOSIS D: Primary | ICD-10-CM

## 2022-07-25 DIAGNOSIS — E27.9 ADRENAL HYPERTENSION (H): ICD-10-CM

## 2022-07-25 DIAGNOSIS — R79.0 LOW FERRITIN LEVEL: ICD-10-CM

## 2022-07-25 DIAGNOSIS — M25.50 PAIN IN JOINT, MULTIPLE SITES: ICD-10-CM

## 2022-07-25 DIAGNOSIS — I15.2 ADRENAL HYPERTENSION (H): ICD-10-CM

## 2022-07-25 DIAGNOSIS — G93.32 CHRONIC FATIGUE SYNDROME: ICD-10-CM

## 2022-07-25 DIAGNOSIS — Z78.0 MENOPAUSE: ICD-10-CM

## 2022-07-25 DIAGNOSIS — R79.0 CALCIUM BLOOD DECREASED: ICD-10-CM

## 2022-09-16 ENCOUNTER — TRANSFERRED RECORDS (OUTPATIENT)
Dept: HEALTH INFORMATION MANAGEMENT | Facility: CLINIC | Age: 62
End: 2022-09-16

## 2022-09-28 ENCOUNTER — TRANSFERRED RECORDS (OUTPATIENT)
Dept: HEALTH INFORMATION MANAGEMENT | Facility: CLINIC | Age: 62
End: 2022-09-28

## 2022-10-11 DIAGNOSIS — M79.7 FIBROMYALGIA: ICD-10-CM

## 2022-10-11 RX ORDER — PREGABALIN 75 MG/1
CAPSULE ORAL
Qty: 90 CAPSULE | Refills: 5 | Status: SHIPPED | OUTPATIENT
Start: 2022-10-11 | End: 2023-05-03

## 2022-10-15 ENCOUNTER — HEALTH MAINTENANCE LETTER (OUTPATIENT)
Age: 62
End: 2022-10-15

## 2022-11-08 DIAGNOSIS — M79.7 FIBROMYALGIA: ICD-10-CM

## 2022-11-08 NOTE — LETTER
LINDSAY [address] [address]  [phone]  November 25, 2022  Goldie Paul  [address]  [place] [address]    Dear Goldie,    I am contacting you regarding the refill request we received for you. After reviewing your chart it looks like you are overdue  for a med check. Please call [phone] or schedule this through my chart to continue to receive refills. If you anticipate running out before your appointment let us know and we can send in a noreen refill.       Thank you,     LINDSAY [facility] nursing staff

## 2022-11-09 NOTE — TELEPHONE ENCOUNTER
Routing refill request to provider for review/approval because:  Patient needs to be seen because it has been more than 1 year since last office visit.    Justice L. Phoenix, RN

## 2022-11-10 RX ORDER — DULOXETIN HYDROCHLORIDE 30 MG/1
CAPSULE, DELAYED RELEASE ORAL
Qty: 180 CAPSULE | Refills: 1 | Status: SHIPPED | OUTPATIENT
Start: 2022-11-10 | End: 2023-05-23

## 2022-11-11 NOTE — TELEPHONE ENCOUNTER
Call patient and assist in scheduling follow-up appointment with me in the next regular available appointment slot.  Medications refilled for 6 months

## 2022-12-25 DIAGNOSIS — N95.1 SYMPTOMATIC MENOPAUSAL OR FEMALE CLIMACTERIC STATES: ICD-10-CM

## 2022-12-25 RX ORDER — ESTRADIOL 0.05 MG/D
PATCH, EXTENDED RELEASE TRANSDERMAL
Qty: 24 PATCH | Refills: 3 | Status: SHIPPED | OUTPATIENT
Start: 2022-12-25 | End: 2023-12-18

## 2023-01-18 ENCOUNTER — TRANSFERRED RECORDS (OUTPATIENT)
Dept: HEALTH INFORMATION MANAGEMENT | Facility: CLINIC | Age: 63
End: 2023-01-18

## 2023-02-01 ENCOUNTER — TRANSFERRED RECORDS (OUTPATIENT)
Dept: HEALTH INFORMATION MANAGEMENT | Facility: CLINIC | Age: 63
End: 2023-02-01

## 2023-02-22 ENCOUNTER — TRANSFERRED RECORDS (OUTPATIENT)
Dept: HEALTH INFORMATION MANAGEMENT | Facility: CLINIC | Age: 63
End: 2023-02-22

## 2023-03-26 ENCOUNTER — HEALTH MAINTENANCE LETTER (OUTPATIENT)
Age: 63
End: 2023-03-26

## 2023-04-01 DIAGNOSIS — M79.7 FIBROMYALGIA: ICD-10-CM

## 2023-04-04 NOTE — TELEPHONE ENCOUNTER
Patient not seen in 2 years.  Was sent letter in November instructing her to make an appointment but not made.  [employer] message was also sent at that time but not read.  Please call patient and get an appointment scheduled with me in clinic in the next 3 months and then route refill request back to me to address

## 2023-05-03 RX ORDER — PREGABALIN 75 MG/1
CAPSULE ORAL
Qty: 270 CAPSULE | Refills: 0 | Status: SHIPPED | OUTPATIENT
Start: 2023-05-03 | End: 2023-06-29

## 2023-05-03 NOTE — TELEPHONE ENCOUNTER
Patient has MyChart but read last message 6 months after sent.  Therefore, inform  patient that Lyrica/pregabalin refilled for 90 days and patient to see me in July as scheduled

## 2023-05-04 DIAGNOSIS — M79.7 FIBROMYALGIA: ICD-10-CM

## 2023-05-04 RX ORDER — PREGABALIN 75 MG/1
CAPSULE ORAL
Qty: 90 CAPSULE | Refills: 0 | OUTPATIENT
Start: 2023-05-04

## 2023-05-04 NOTE — TELEPHONE ENCOUNTER
Patient is out of medication, can this be approved today.       Meche Hart RN  [facility]

## 2023-05-05 NOTE — TELEPHONE ENCOUNTER
Pt called back     States [facility] never got the script     Yesterday  went in to pick it up     Today they told pt they never received the script     Called [facility] and spoke with pharmacist, called in Rx as it was approved by PCP     Remi Gates RN  [facility] Internal Medicine [facility]

## 2023-05-09 ENCOUNTER — TRANSFERRED RECORDS (OUTPATIENT)
Dept: HEALTH INFORMATION MANAGEMENT | Facility: CLINIC | Age: 63
End: 2023-05-09
Payer: COMMERCIAL

## 2023-05-12 ENCOUNTER — TRANSFERRED RECORDS (OUTPATIENT)
Dept: HEALTH INFORMATION MANAGEMENT | Facility: CLINIC | Age: 63
End: 2023-05-12
Payer: COMMERCIAL

## 2023-05-22 DIAGNOSIS — M79.7 FIBROMYALGIA: ICD-10-CM

## 2023-05-23 RX ORDER — DULOXETIN HYDROCHLORIDE 30 MG/1
CAPSULE, DELAYED RELEASE ORAL
Qty: 180 CAPSULE | Refills: 0 | Status: SHIPPED | OUTPATIENT
Start: 2023-05-23 | End: 2023-08-14

## 2023-05-30 ENCOUNTER — PATIENT OUTREACH (OUTPATIENT)
Dept: CARE COORDINATION | Facility: CLINIC | Age: 63
End: 2023-05-30
Payer: COMMERCIAL

## 2023-05-31 ENCOUNTER — TRANSFERRED RECORDS (OUTPATIENT)
Dept: HEALTH INFORMATION MANAGEMENT | Facility: CLINIC | Age: 63
End: 2023-05-31
Payer: COMMERCIAL

## 2023-06-13 ENCOUNTER — PATIENT OUTREACH (OUTPATIENT)
Dept: CARE COORDINATION | Facility: CLINIC | Age: 63
End: 2023-06-13
Payer: COMMERCIAL

## 2023-06-14 ENCOUNTER — TRANSFERRED RECORDS (OUTPATIENT)
Dept: HEALTH INFORMATION MANAGEMENT | Facility: CLINIC | Age: 63
End: 2023-06-14
Payer: COMMERCIAL

## 2023-06-28 ENCOUNTER — MYC MEDICAL ADVICE (OUTPATIENT)
Dept: INTERNAL MEDICINE | Facility: CLINIC | Age: 63
End: 2023-06-28
Payer: COMMERCIAL

## 2023-06-28 DIAGNOSIS — M79.7 FIBROMYALGIA: ICD-10-CM

## 2023-06-30 RX ORDER — PREGABALIN 75 MG/1
CAPSULE ORAL
Qty: 270 CAPSULE | Refills: 0 | Status: SHIPPED | OUTPATIENT
Start: 2023-06-30 | End: 2023-08-14

## 2023-08-08 ENCOUNTER — ANCILLARY PROCEDURE (OUTPATIENT)
Dept: GENERAL RADIOLOGY | Facility: CLINIC | Age: 63
End: 2023-08-08
Attending: INTERNAL MEDICINE
Payer: COMMERCIAL

## 2023-08-08 ENCOUNTER — OFFICE VISIT (OUTPATIENT)
Dept: INTERNAL MEDICINE | Facility: CLINIC | Age: 63
End: 2023-08-08
Payer: COMMERCIAL

## 2023-08-08 VITALS
OXYGEN SATURATION: 97 % | HEIGHT: 63 IN | SYSTOLIC BLOOD PRESSURE: 120 MMHG | BODY MASS INDEX: 24.22 KG/M2 | WEIGHT: 136.7 LBS | HEART RATE: 90 BPM | DIASTOLIC BLOOD PRESSURE: 82 MMHG | TEMPERATURE: 98.5 F

## 2023-08-08 DIAGNOSIS — M54.9 CHRONIC BACK PAIN, UNSPECIFIED BACK LOCATION, UNSPECIFIED BACK PAIN LATERALITY: ICD-10-CM

## 2023-08-08 DIAGNOSIS — G89.29 CHRONIC BACK PAIN, UNSPECIFIED BACK LOCATION, UNSPECIFIED BACK PAIN LATERALITY: ICD-10-CM

## 2023-08-08 DIAGNOSIS — Z13.1 SCREENING FOR DIABETES MELLITUS: ICD-10-CM

## 2023-08-08 DIAGNOSIS — Z23 NEED FOR MMR VACCINE: ICD-10-CM

## 2023-08-08 DIAGNOSIS — Z13.6 CARDIOVASCULAR SCREENING; LDL GOAL LESS THAN 100: ICD-10-CM

## 2023-08-08 DIAGNOSIS — R14.0 BLOATING: ICD-10-CM

## 2023-08-08 PROCEDURE — 90707 MMR VACCINE SC: CPT | Performed by: INTERNAL MEDICINE

## 2023-08-08 PROCEDURE — 99214 OFFICE O/P EST MOD 30 MIN: CPT | Mod: 25 | Performed by: INTERNAL MEDICINE

## 2023-08-08 PROCEDURE — 72080 X-RAY EXAM THORACOLMB 2/> VW: CPT | Mod: TC | Performed by: RADIOLOGY

## 2023-08-08 PROCEDURE — 90471 IMMUNIZATION ADMIN: CPT | Performed by: INTERNAL MEDICINE

## 2023-08-08 PROCEDURE — 72100 X-RAY EXAM L-S SPINE 2/3 VWS: CPT | Mod: TC | Performed by: RADIOLOGY

## 2023-08-08 RX ORDER — DOXYCYCLINE 100 MG/1
100 CAPSULE ORAL 2 TIMES DAILY
Qty: 20 CAPSULE | Refills: 0 | Status: SHIPPED | OUTPATIENT
Start: 2023-08-08 | End: 2023-08-18

## 2023-08-08 NOTE — PATIENT INSTRUCTIONS
Xray thoracic and lumbar spine   MMR vaccine   Labs as ordered fasting.    Doxycycline 100mg capsule, 1 capsule twice a day with food for 10 days  for possibles small  bowel  bacterial  overgrowth causing bloating and gassiness   If  not better, then  let me know and will refer to [facility] for breath testing, etc

## 2023-08-08 NOTE — PROGRESS NOTES
ASSESSMENT:   1. Bloating  Intermittent but chronic.  Last colonoscopy 2021.  Bowel moods okay.  Labs ordered.  Trial of doxycycline for 10 days for possible small bowel overgrowth issues.  Patient unable to relate symptoms to particular foods.  However, if not improving, patient to let me know and will refer to [facility] for breath testing for lactose/fructose/sucrose intolerance, etc. Previous testing for celiac disease negative  - TSH with free T4 reflex; Future  - doxycycline hyclate (VIBRAMYCIN) 100 MG capsule; Take 1 capsule (100 mg) by mouth 2 times daily for 10 days  Dispense: 20 capsule; Refill: 0    2. Chronic back pain, unspecified back location, unspecified back pain laterality  Chronic back pain issues.  X-ray to rule out structural abnormality.  CRP to evaluate for inflammatory process present. Also working with [facility]. Previous PT. Continue current cardiac care and massage  - XR Thoracic Lumbar Standing 2 Views; Future  - XR Lumbar Spine 2/3 Views; Future  - CRP inflammation; Future    3. CARDIOVASCULAR SCREENING; LDL GOAL LESS THAN 100  Candidate for screening  - Lipid panel reflex to direct LDL Fasting; Future    4. Screening for diabetes mellitus  Candidate for screening  - Comprehensive metabolic panel; Future    5. Need for MMR vaccine  Needs MMR booster for low titers. See results in chart  - MMR (M-M-R II)        PLAN:   Xray thoracic and lumbar spine   MMR vaccine   Labs as ordered fasting.    Doxycycline 100mg capsule, 1 capsule twice a day with food for 10 days  for possibles small  bowel  bacterial  overgrowth causing bloating and gassiness   If  not better, then  let me know and will refer to [facility] for breath testing, etc      (Chart documentation was completed, in part, with [employer] voice-recognition software. Even though reviewed, some grammatical, spelling, and word errors may remain.)    Misbah Negrete MD  Internal Medicine Department  [facility]    Goldie is a 63 year old, presenting for the following health issues:  Recheck Medication        History of Present Illness       Back Pain:  She presents for follow up of back pain. Patient's back pain is a chronic problem.  Location of back pain:  Right lower back, left lower back, right middle of back, left middle of back, right upper back, left upper back, right side of neck, left side of neck, right shoulder, left shoulder, right hip and left hip  Description of back pain: burning, dull ache, sharp and stabbing  Back pain spreads: nowhere    Since patient first noticed back pain, pain is: always present, but gets better and worse  Does back pain interfere with her job:  Yes     She eats 4 or more servings of fruits and vegetables daily.She consumes 0 sweetened beverage(s) daily.She exercises with enough effort to increase her heart rate 20 to 29 minutes per day.  She exercises with enough effort to increase her heart rate 4 days per week.   She is taking medications regularly.      Additional concerns: interested in MMR vaccine, occ bloating abdomen       Most recent lab results reviewed with pt.      Patient applied as a nurse.  Needs MMR booster because of recent low titer results.  Chronic thoracic and lumbar area back pain.  Has been working with a chiropractor.  Occasional pain in right scapular area.  Has been getting some dislodges with some improvement.  Worse with prolonged standing such as doing dishes at home.  Chronic fibromyalgia.  On Lyrica.  Has been going for walks 3 times per week.  Some weight gain.  Intermittent abdominal bloating symptoms.  Denies constipation.  No blood seen in stools.  No nausea or vomiting.  Last colonoscopy done 2021.  See report in chart    Additional ROS:   Constitutional, HEENT, Cardiovascular, Pulmonary, GI and , Neuro, MSK and Psych review of systems/symptoms are otherwise negative or unchanged from previous, except as noted above.      OBJECTIVE:  /82    "Pulse 90   Temp 98.5  F (36.9  C) (Oral)   Ht 1.588 m (5' 2.5\")   Wt 62 kg (136 lb 11.2 oz)   SpO2 97%   BMI 24.60 kg/m     Estimated body mass index is 24.6 kg/m  as calculated from the following:    Height as of this encounter: 1.588 m (5' 2.5\").    Weight as of this encounter: 62 kg (136 lb 11.2 oz).     Neck: no adenopathy. Thyroid normal to palpation. No bruits  Pulm: Lungs clear to auscultation   CV: Regular rates and rhythm  GI: Soft, nontender all 4 quadrants despite subjective symptoms, Normal active bowel sounds, No hepatosplenomegaly or masses palpable  Ext: Peripheral pulses intact. No edema.  Neuro: Normal strength and tone, sensory exam grossly normal.  General tenderness palpation globally fibromyalgia trigger points  MSK: Tenderness palpation bilateral parathoracic and paralumbar musculature.  Negative straight leg raise test bilaterally            "

## 2023-08-14 ENCOUNTER — MYC REFILL (OUTPATIENT)
Dept: INTERNAL MEDICINE | Facility: CLINIC | Age: 63
End: 2023-08-14
Payer: COMMERCIAL

## 2023-08-14 DIAGNOSIS — K21.9 GASTROESOPHAGEAL REFLUX DISEASE WITHOUT ESOPHAGITIS: Primary | ICD-10-CM

## 2023-08-14 DIAGNOSIS — M79.7 FIBROMYALGIA: ICD-10-CM

## 2023-08-14 RX ORDER — DULOXETIN HYDROCHLORIDE 30 MG/1
30 CAPSULE, DELAYED RELEASE ORAL 2 TIMES DAILY
Qty: 180 CAPSULE | Refills: 2 | Status: SHIPPED | OUTPATIENT
Start: 2023-08-14 | End: 2024-09-04

## 2023-08-15 RX ORDER — PREGABALIN 75 MG/1
CAPSULE ORAL
Qty: 270 CAPSULE | Refills: 1 | Status: SHIPPED | OUTPATIENT
Start: 2023-08-15 | End: 2024-01-23

## 2023-08-20 ENCOUNTER — HEALTH MAINTENANCE LETTER (OUTPATIENT)
Age: 63
End: 2023-08-20

## 2023-08-25 ENCOUNTER — NURSE TRIAGE (OUTPATIENT)
Dept: INTERNAL MEDICINE | Facility: CLINIC | Age: 63
End: 2023-08-25
Payer: COMMERCIAL

## 2023-08-25 ENCOUNTER — MYC MEDICAL ADVICE (OUTPATIENT)
Dept: INTERNAL MEDICINE | Facility: CLINIC | Age: 63
End: 2023-08-25
Payer: COMMERCIAL

## 2023-08-25 NOTE — TELEPHONE ENCOUNTER
Please see nurse triage encounter 8/25/23.    Evangelina GOOD RN  [facility] Triage Team

## 2023-08-25 NOTE — TELEPHONE ENCOUNTER
"Patient called regarding her Mychart message. Patient stated that she believes she is having an interaction of her antibiotic. Patient stated that when she swallows water or food, it feels like a lump moving down her throat. Patient was told that it sounded like she may be having some swelling in her throat. Advised patient to go to urgent care because of this. Patient agreed with this plan and had no further questions.    Reason for Disposition   SEVERE symptoms of pill stuck in throat or esophagus (e.g., severe pain, bleeding, or difficulty swallowing liquids)    Additional Information   Negative: SEVERE difficulty swallowing (e.g., drooling or spitting) and started suddenly after taking a medicine or allergic foods   Negative: Wheezing, stridor, hoarseness, or difficulty breathing   Negative: Swollen tongue and sudden onset   Negative: Sounds like a life-threatening emergency to the triager   Negative: Sore throat (throat pain with swallowing)   Negative: SEVERE difficulty swallowing (e.g., drooling or spitting, can't swallow water)   Negative: Symptoms of food or bone stuck in throat or esophagus (e.g., pain in throat or chest, FB sensation, blood-tinged saliva)    Answer Assessment - Initial Assessment Questions  1. SYMPTOM: \"Are you having difficulty swallowing liquids, solids, or both?\"        Patient stated that it feels \"like a big lump going down her throat when she swallows.     2. ONSET: \"When did the swallowing problems begin?\"         Monday and has gotten worse    3. CAUSE: \"What do you think is causing the problem?\"         Side effect from doxycycline     4. CHRONIC/RECURRENT: \"Is this a new problem for you?\"  If no, ask: \"How long have you had this problem?\" (e.g., days, weeks, months)         Has not had the problem before and does not remember taking the medication int the past    5. OTHER SYMPTOMS: \"Do you have any other symptoms?\" (e.g., difficulty breathing, sore throat, swollen tongue, chest " "pain)        None    6. PREGNANCY: \"Is there any chance you are pregnant?\" \"When was your last menstrual period?\"        No    Protocols used: Swallowing Difficulty-RODRIGO GOOD RN  [facility] Triage Team    "

## 2023-09-08 ENCOUNTER — LAB (OUTPATIENT)
Dept: LAB | Facility: CLINIC | Age: 63
End: 2023-09-08
Payer: COMMERCIAL

## 2023-09-08 DIAGNOSIS — Z13.1 SCREENING FOR DIABETES MELLITUS: ICD-10-CM

## 2023-09-08 DIAGNOSIS — R14.0 BLOATING: ICD-10-CM

## 2023-09-08 DIAGNOSIS — G89.29 CHRONIC BACK PAIN, UNSPECIFIED BACK LOCATION, UNSPECIFIED BACK PAIN LATERALITY: ICD-10-CM

## 2023-09-08 DIAGNOSIS — Z13.6 CARDIOVASCULAR SCREENING; LDL GOAL LESS THAN 100: ICD-10-CM

## 2023-09-08 DIAGNOSIS — M54.9 CHRONIC BACK PAIN, UNSPECIFIED BACK LOCATION, UNSPECIFIED BACK PAIN LATERALITY: ICD-10-CM

## 2023-09-08 LAB
ALBUMIN SERPL BCG-MCNC: 4.2 G/DL (ref 3.5–5.2)
ALP SERPL-CCNC: 109 U/L (ref 35–104)
ALT SERPL W P-5'-P-CCNC: 32 U/L (ref 0–50)
ANION GAP SERPL CALCULATED.3IONS-SCNC: 9 MMOL/L (ref 7–15)
AST SERPL W P-5'-P-CCNC: 29 U/L (ref 0–45)
BILIRUB SERPL-MCNC: 0.3 MG/DL
BUN SERPL-MCNC: 9.9 MG/DL (ref 8–23)
CALCIUM SERPL-MCNC: 9.4 MG/DL (ref 8.8–10.2)
CHLORIDE SERPL-SCNC: 100 MMOL/L (ref 98–107)
CHOLEST SERPL-MCNC: 260 MG/DL
CREAT SERPL-MCNC: 0.58 MG/DL (ref 0.51–0.95)
CRP SERPL-MCNC: <3 MG/L
DEPRECATED HCO3 PLAS-SCNC: 26 MMOL/L (ref 22–29)
EGFRCR SERPLBLD CKD-EPI 2021: >90 ML/MIN/1.73M2
GLUCOSE SERPL-MCNC: 106 MG/DL (ref 70–99)
HDLC SERPL-MCNC: 52 MG/DL
LDLC SERPL CALC-MCNC: 164 MG/DL
NONHDLC SERPL-MCNC: 208 MG/DL
POTASSIUM SERPL-SCNC: 4.3 MMOL/L (ref 3.4–5.3)
PROT SERPL-MCNC: 7.2 G/DL (ref 6.4–8.3)
SODIUM SERPL-SCNC: 135 MMOL/L (ref 136–145)
TRIGL SERPL-MCNC: 218 MG/DL
TSH SERPL DL<=0.005 MIU/L-ACNC: 1.74 UIU/ML (ref 0.3–4.2)

## 2023-09-08 PROCEDURE — 86140 C-REACTIVE PROTEIN: CPT

## 2023-09-08 PROCEDURE — 36415 COLL VENOUS BLD VENIPUNCTURE: CPT

## 2023-09-08 PROCEDURE — 80061 LIPID PANEL: CPT

## 2023-09-08 PROCEDURE — 80053 COMPREHEN METABOLIC PANEL: CPT

## 2023-09-08 PROCEDURE — 84443 ASSAY THYROID STIM HORMONE: CPT

## 2023-09-29 NOTE — TELEPHONE ENCOUNTER
Informed patient via  and [employer].  
Noted.   I will be working tomorrow but then out of the office next week. Per pt notes in [facility] part of message, pt unable to do appt tomorrow due to her work.  Would be best to do appt with me rather than talking with partner next week.   I have refilled pt's tramadol for #30 tabs to cover her for now at her [facility] pharmacy. Pt to schedule virtual appt with me the week of 4/27 when I will be back in clinic unless there are other acute issues she needs to address. If that was the case, then may do appt with partner next week  
PCP please see MyChart message as VIANCA ROLANDN, RN, PHN      
Enoxaparin/Lovenox - Compliance/Enoxaparin/Lovenox - Dietary Advice/Enoxaparin/Lovenox - Follow up monitoring/Enoxaparin/Lovenox - Potential for adverse drug reactions and interactions

## 2023-10-02 ENCOUNTER — MYC MEDICAL ADVICE (OUTPATIENT)
Dept: INTERNAL MEDICINE | Facility: CLINIC | Age: 63
End: 2023-10-02
Payer: COMMERCIAL

## 2023-10-02 DIAGNOSIS — M79.7 FIBROMYALGIA: ICD-10-CM

## 2023-10-02 RX ORDER — CYCLOBENZAPRINE HCL 5 MG
TABLET ORAL
Qty: 180 TABLET | Refills: 1 | Status: SHIPPED | OUTPATIENT
Start: 2023-10-02

## 2023-10-02 RX ORDER — CYCLOBENZAPRINE HCL 5 MG
TABLET ORAL
Qty: 180 TABLET | Refills: 1 | Status: CANCELLED | OUTPATIENT
Start: 2023-10-02

## 2023-11-01 ENCOUNTER — TRANSFERRED RECORDS (OUTPATIENT)
Dept: HEALTH INFORMATION MANAGEMENT | Facility: CLINIC | Age: 63
End: 2023-11-01
Payer: COMMERCIAL

## 2023-12-18 DIAGNOSIS — N95.1 SYMPTOMATIC MENOPAUSAL OR FEMALE CLIMACTERIC STATES: ICD-10-CM

## 2023-12-18 RX ORDER — ESTRADIOL 0.05 MG/D
PATCH, EXTENDED RELEASE TRANSDERMAL
Qty: 24 PATCH | Refills: 0 | Status: SHIPPED | OUTPATIENT
Start: 2023-12-18 | End: 2024-09-17

## 2023-12-22 ENCOUNTER — TELEPHONE (OUTPATIENT)
Dept: PODIATRY | Facility: CLINIC | Age: 63
End: 2023-12-22
Payer: COMMERCIAL

## 2023-12-22 NOTE — TELEPHONE ENCOUNTER
Left voicemail for patient to return call. Please see below.     Cassandra Brown MSA, ATC  Certified Athletic Trainer

## 2023-12-22 NOTE — TELEPHONE ENCOUNTER
Refill request received via fax from [facility] for ciclopirox olamine external cream .77%.      Huddled with Dr. Iqbal- she is unable to refill as she has not seen this patient since 2021. Patient would need a follow up visit before anything can be prescribed.     Cassandra Brown MSA, ATC  Certified Athletic Trainer

## 2023-12-26 ENCOUNTER — TELEPHONE (OUTPATIENT)
Dept: PODIATRY | Facility: CLINIC | Age: 63
End: 2023-12-26
Payer: COMMERCIAL

## 2023-12-26 NOTE — TELEPHONE ENCOUNTER
[facility] Call Center    Phone Message    May a detailed message be left on voicemail: yes     Reason for Call: Other: [facility] is calling because they never received the refill for Goldie please send it again.     Action Taken: Other: [place]    Travel Screening: Not Applicable

## 2023-12-26 NOTE — TELEPHONE ENCOUNTER
Duplicate encounter. Please also see refill request dated 12/22/23.     Phone call to [facility] and confirmed they were calling about a refill for Ciclopirox. They were informed we are unable to refill and patient needs to be seen. They verbalized understanding.    JENNIFER William RN

## 2024-01-18 DIAGNOSIS — M79.7 FIBROMYALGIA: ICD-10-CM

## 2024-01-19 RX ORDER — PREGABALIN 75 MG/1
CAPSULE ORAL
Qty: 270 CAPSULE | Refills: 1 | OUTPATIENT
Start: 2024-01-19

## 2024-01-23 RX ORDER — PREGABALIN 75 MG/1
CAPSULE ORAL
Qty: 270 CAPSULE | Refills: 1 | Status: SHIPPED | OUTPATIENT
Start: 2024-01-23

## 2024-01-24 ENCOUNTER — VIRTUAL VISIT (OUTPATIENT)
Dept: INTERNAL MEDICINE | Facility: CLINIC | Age: 64
End: 2024-01-24
Payer: COMMERCIAL

## 2024-01-24 DIAGNOSIS — M79.7 FIBROMYALGIA: Primary | ICD-10-CM

## 2024-01-24 DIAGNOSIS — R53.83 OTHER FATIGUE: ICD-10-CM

## 2024-01-24 DIAGNOSIS — R73.9 BLOOD GLUCOSE ELEVATED: ICD-10-CM

## 2024-01-24 DIAGNOSIS — E78.5 HYPERLIPIDEMIA LDL GOAL <100: ICD-10-CM

## 2024-01-24 DIAGNOSIS — R51.9 ACUTE NONINTRACTABLE HEADACHE, UNSPECIFIED HEADACHE TYPE: ICD-10-CM

## 2024-01-24 DIAGNOSIS — R03.0 ELEVATED BP WITHOUT DIAGNOSIS OF HYPERTENSION: ICD-10-CM

## 2024-01-24 PROCEDURE — 99214 OFFICE O/P EST MOD 30 MIN: CPT | Mod: 95 | Performed by: INTERNAL MEDICINE

## 2024-01-24 RX ORDER — ESTRADIOL 0.05 MG/D
PATCH, EXTENDED RELEASE TRANSDERMAL
Qty: 24 PATCH | Refills: 0 | Status: CANCELLED | OUTPATIENT
Start: 2024-01-24

## 2024-01-24 RX ORDER — DULOXETIN HYDROCHLORIDE 30 MG/1
30 CAPSULE, DELAYED RELEASE ORAL 2 TIMES DAILY
Qty: 180 CAPSULE | Refills: 2 | Status: CANCELLED | OUTPATIENT
Start: 2024-01-24

## 2024-01-24 RX ORDER — PREGABALIN 75 MG/1
CAPSULE ORAL
Qty: 270 CAPSULE | Refills: 1 | Status: CANCELLED | OUTPATIENT
Start: 2024-01-24

## 2024-01-24 RX ORDER — CYCLOBENZAPRINE HCL 5 MG
TABLET ORAL
Qty: 180 TABLET | Refills: 1 | Status: CANCELLED | OUTPATIENT
Start: 2024-01-24

## 2024-01-24 RX ORDER — CICLOPIROX OLAMINE 7.7 MG/G
CREAM TOPICAL DAILY
Qty: 30 G | Refills: 6 | Status: CANCELLED | OUTPATIENT
Start: 2024-01-24

## 2024-01-24 NOTE — PROGRESS NOTES
Goldie is a 63 year old who is being evaluated via a billable video visit.      How would you like to obtain your AVS? [employer]  If the video visit is dropped, the invitation should be resent by: Send to e-mail at: [email or web address]  Will anyone else be joining your video visit? No          ASSESSMENT:    1. Fibromyalgia  Pain worsened some recently.  Concern for possible sleep apnea as below which would worsen symptoms.  Continue current Lyrica and duloxetine for now    2. Elevated BP without diagnosis of hypertension  Patient elevated blood pressures as below.  Possibly related to sleep apnea versus other.  Get blood pressure recheck through [facility] in a couple weeks  - Adult Sleep Eval & Management  Referral; Future    3. Other fatigue  Concerning for possible sleep apnea.  Sleep clinic referral as below.  Recent kidney and liver and thyroid results normal.  Additional labs as below  - Cortisol; Future  - Hemoglobin; Future  - Adult Sleep Eval & Management  Referral; Future    4. Acute nonintractable headache, unspecified headache type  Patient scribes some nasal congestion issues.  Concern for sleep apnea also.  Question whether elevated blood pressure related to headaches or if pressure could be contributing to headache.  Denies headache currently.  Does not sound like migraine.  Patient to try using Nasacort.  Sleep study as below  - Adult Sleep Eval & Management  Referral; Future    5. Blood glucose elevated  Previous fasting glucose as below.  Patient working on your carb intake in diet.  Repeat labs in March  - Hemoglobin A1c; Future  - Basic metabolic panel; Future    6. Hyperlipidemia LDL goal <100  Previous 10-year CAD risk 4.4% so treating previously with diet therapy.  Due for lab follow-up in March  - Lipid panel reflex to direct LDL Fasting; Future      PLAN:  Call  [phone] or use [employer] to schedule a future lab appointment  fasting in mid March 2024    For  fasting labs, please refrain from eating for 8 hours or more.   Drink 2 glasses of water before your lab appointment. It is fine to take your  oral medications on the morning of the lab test as usual  Referral to [facility] re: possible obstructive sleep apnea causing elevated blood pressure,headaches, fatigue, worsened fibromyalgia pains. Central scheduling will call you to schedule your consultation appointment.  If you don't hear from a representative within 2 business days, please call [phone].    Nasacort 2 spray each nostril daily  at bedtime as needed for nasal congestion. May get over the counter  Get a blood pressure recheck   in  2 weeks at the  [facility] pharmacy. You will need to go to [email or web address] to schedule the blood pressure check appointment.  Continue current medications  If you do not have any carbon monoxide detectors in your home, get one to be sure no elevated levels that could contribute to headaches, nausea, fatigue, etc also        Video-Visit Details    Type of service:  Video Visit    Video Start Time: 5:41pm    Video End Time:6:04pm    Originating Location (pt. Location): Home    Distant Location (provider location):  [facility]     Platform used for Video Visit: Angie Amezcua is a 63 year old, presenting for the following health issues:  Recheck Medication (Lyrica) and Hypertension    History of Present Illness       Hypertension: She presents for follow up of hypertension.  She does not check blood pressure  regularly outside of the clinic. Outpatient blood pressures have not been over 140/90. She follows a low salt diet.     She eats 4 or more servings of fruits and vegetables daily.She consumes 0 sweetened beverage(s) daily.She exercises with enough effort to increase her heart rate 10 to 19 minutes per day.  She exercises with enough effort to increase her heart rate 4 days per week.   She is  taking medications regularly.         Most recent lab results reviewed with pt.      HPI:   Reports blood pressure yesterday 137/94.  At work was 140/95.  Has some daytime fatigue.  Has been snoring recently.  Some nasal congestion.  Occasional headaches top of head bilaterally.  No vision changes with this.  Rare nausea.  No vomiting.  Other family numbers had some recent nausea also for a week but resolved.  No diarrhea.  Patient is a minimal lingering headache currently.  No radiation to the upper extremities.    On duloxetine and Lyrica for chronic abdomen pain.  Symptoms mildly worsened recently  Denies chest pain, shortness of breath, abdominal pain.  Working with [facility] regarding bilateral shoulder pain.  Recently underwent an injection in the right shoulder November with benefit.  Wearing a thumb splint for left wrist area with benefit     Additional ROS:   Constitutional, HEENT, Cardiovascular, Pulmonary, GI and , Neuro, MSK and Psych review of systems/symptoms are otherwise negative or unchanged from previous, except as noted above.           Objective :  No vitals available today.  See recent blood pressure reading above    Physical Exam:  GENERAL:   alert and no distress  EYES: Eyes grossly normal to inspection, conjunctivae and sclerae normal  RESP: no audible wheeze, cough, or visible cyanosis.  No visible retractions or increased work of breathing.  Able to speak fully in complete sentences.  NEURO: Cranial nerves grossly intact, mentation intact and speech normal  PSYCH: mentation appears normal, affect normal/bright, judgement and insight intact, normal speech and appearance well-groomed       Misbah Negrete MD  Internal Medicine Department  [facility]  Internal Medicine Department      (Chart documentation was completed, in part, with [employer] voice-recognition software. Even though reviewed, some grammatical, spelling, and word errors may remain.)

## 2024-01-28 PROBLEM — R03.0 ELEVATED BP WITHOUT DIAGNOSIS OF HYPERTENSION: Status: ACTIVE | Noted: 2024-01-28

## 2024-01-29 NOTE — PATIENT INSTRUCTIONS
Call  [phone] or use [employer] to schedule a future lab appointment  fasting in mid March 2024    For fasting labs, please refrain from eating for 8 hours or more.   Drink 2 glasses of water before your lab appointment. It is fine to take your  oral medications on the morning of the lab test as usual  Referral to [facility] re: possible obstructive sleep apnea causing elevated blood pressure,headaches, fatigue, worsened fibromyalgia pains. Central scheduling will call you to schedule your consultation appointment.  If you don't hear from a representative within 2 business days, please call [phone].    Nasacort 2 spray each nostril daily  at bedtime as needed for nasal congestion. May get over the counter  Get a blood pressure recheck   in  2 weeks at the  [facility] pharmacy. You will need to go to [email or web address] to schedule the blood pressure check appointment.  Continue current medications  If you do not have any carbon monoxide detectors in your home, get one to be sure no elevated levels that could contribute to headaches, nausea, fatigue, etc also

## 2024-02-12 ENCOUNTER — TRANSFERRED RECORDS (OUTPATIENT)
Dept: HEALTH INFORMATION MANAGEMENT | Facility: CLINIC | Age: 64
End: 2024-02-12

## 2024-03-04 ENCOUNTER — TRANSFERRED RECORDS (OUTPATIENT)
Dept: HEALTH INFORMATION MANAGEMENT | Facility: CLINIC | Age: 64
End: 2024-03-04

## 2024-04-10 ENCOUNTER — TRANSFERRED RECORDS (OUTPATIENT)
Dept: HEALTH INFORMATION MANAGEMENT | Facility: CLINIC | Age: 64
End: 2024-04-10
Payer: COMMERCIAL

## 2024-04-12 ENCOUNTER — TELEPHONE (OUTPATIENT)
Dept: PODIATRY | Facility: CLINIC | Age: 64
End: 2024-04-12
Payer: COMMERCIAL

## 2024-04-12 NOTE — TELEPHONE ENCOUNTER
Patient has not been seen since 8/25/21.     Phone call to patient and informed she would need an appointment to be re-evaluated for a referral order. Also explained that insurances usually require a office visit with provider within 6-12 months.   Appointment scheduled for 4/23/24 at 4:15 pm with 4 pm check in. She verbalized understanding.     JENNIFER William RN

## 2024-04-12 NOTE — TELEPHONE ENCOUNTER
[facility] Call Center    Phone Message    May a detailed message be left on voicemail: yes     Reason for Call: Order(s): Other: Orthotics   Reason for requested: Bilateral Plantar fascitis and foot pain   Date needed: asap  Provider name: [facility]     Action Taken: Other: Rasheed     Travel Screening: Not Applicable

## 2024-04-23 ENCOUNTER — OFFICE VISIT (OUTPATIENT)
Dept: PODIATRY | Facility: CLINIC | Age: 64
End: 2024-04-23
Payer: COMMERCIAL

## 2024-04-23 VITALS
HEIGHT: 63 IN | SYSTOLIC BLOOD PRESSURE: 118 MMHG | BODY MASS INDEX: 24.1 KG/M2 | WEIGHT: 136 LBS | DIASTOLIC BLOOD PRESSURE: 72 MMHG

## 2024-04-23 DIAGNOSIS — M76.62 ACHILLES TENDONITIS, BILATERAL: ICD-10-CM

## 2024-04-23 DIAGNOSIS — Q66.71 PES CAVUS OF BOTH FEET: ICD-10-CM

## 2024-04-23 DIAGNOSIS — B35.1 ONYCHOMYCOSIS OF TOENAIL: ICD-10-CM

## 2024-04-23 DIAGNOSIS — M79.671 FOOT PAIN, BILATERAL: Primary | ICD-10-CM

## 2024-04-23 DIAGNOSIS — M79.672 FOOT PAIN, BILATERAL: Primary | ICD-10-CM

## 2024-04-23 DIAGNOSIS — M20.12 HAV (HALLUX ABDUCTO VALGUS), LEFT: ICD-10-CM

## 2024-04-23 DIAGNOSIS — M72.2 PLANTAR FASCIITIS, BILATERAL: ICD-10-CM

## 2024-04-23 DIAGNOSIS — M76.61 ACHILLES TENDONITIS, BILATERAL: ICD-10-CM

## 2024-04-23 DIAGNOSIS — M20.11 HAV (HALLUX ABDUCTO VALGUS), RIGHT: ICD-10-CM

## 2024-04-23 DIAGNOSIS — Q66.72 PES CAVUS OF BOTH FEET: ICD-10-CM

## 2024-04-23 PROCEDURE — 99214 OFFICE O/P EST MOD 30 MIN: CPT | Performed by: PODIATRIST

## 2024-04-23 RX ORDER — CICLOPIROX OLAMINE 7.7 MG/G
CREAM TOPICAL DAILY
Qty: 30 G | Refills: 6 | Status: SHIPPED | OUTPATIENT
Start: 2024-04-23

## 2024-04-23 RX ORDER — DEXAMETHASONE SODIUM PHOSPHATE 4 MG/ML
4 INJECTION, SOLUTION INTRA-ARTICULAR; INTRALESIONAL; INTRAMUSCULAR; INTRAVENOUS; SOFT TISSUE SEE ADMIN INSTRUCTIONS
Qty: 30 ML | Refills: 0 | Status: SHIPPED | OUTPATIENT
Start: 2024-04-23

## 2024-04-23 NOTE — LETTER
"    4/23/2024         RE: Goldie Paul  [address]  [place] [address]        Dear Colleague,    Thank you for referring your patient, Goldie Paul, to the [facility]. Please see a copy of my visit note below.    Podiatry / Foot and Ankle Surgery Progress Note    April 23, 2024    Subject: Patient was seen for continued pain to both feet.  Notes that she continues to have pain in the heels.  Notes that her inserts she got a few years ago did help but pain is starting to get worse.  Also notes that the toenails were doing well and then started to darken again and she is out of the antifungal cream.  She has bunions on both feet that are sore at times and is wondering what can be done for them.  Pain can be 6 out of 10 at its worst.    Objective:  Vitals: /72   Ht 1.588 m (5' 2.5\")   Wt 61.7 kg (136 lb)   BMI 24.48 kg/m    BMI= Body mass index is 24.48 kg/m .    General:  Patient is alert and orientated.  NAD.    Dermatologic: Right second toenail is darkened and thickened.     Vascular: DP & PT pulses are intact & regular bilaterally.  No significant edema or varicosities noted.  CFT and skin temperature is normal to both lower extremities.     Neurologic: Lower extremity sensation is intact to light touch.  No evidence of weakness or contracture in the lower extremities.  No evidence of neuropathy.     Musculoskeletal: Patient is ambulatory without assistive device or brace.  Medial deviation of the left first metatarsal head and lateral deviation of the left great toe.  Left toes 2 through 4 are semiflexibly contracted at the PIPJ's.  Pain on palpation of the left plantar heel as well as the posterior tibial tendon.  Pain with inversion and eversion of the foot.      Assessment:    Foot pain, bilateral  Pes cavus of both feet  Plantar fasciitis, bilateral  Achilles tendonitis, bilateral  Hav (hallux abducto valgus), right  Hav (hallux abducto valgus), " left  Onychomycosis of toenail      Medical Decision Making/Plan:  The potential causes and nature of plantar fasciitis were discussed with the patient.  We reviewed the natural history/prognosis of the condition and risks if left untreated.  These include chronic pain, other sites of pain due to gait changes, and potential plantar fascial rupture.      We discussed possible causes of the condition as it relates to the patients specific situation.      Conservative treatment options were reviewed:  appropriate shoes, avoidance of barefoot walking, inserts/orthoses, stretching, ice, massage, immobilization and NSAIDs.     We also reviewed the options of injection therapy and surgery.  However, it was made clear that surgery is only considered when conservative therapy fails.  The risks and benefits of injection therapy, and surgery were discussed.     After thorough discussion and answering all questions, the patient elected to continue inserts.  We will order this.  Also ordered physical therapy with iontophoresis to try to help with some of the pain and stretching to this area as well as the Achilles tendinitis.    Reviewed and discussed causes of bunion with patient.  Explained that it is in large part due to a patients foot type and how they walk.   Discussed treatment options with patient including orthotics, splints, pads, and shoe gear.  We discussed that sometimes cortisone injections can help with the pain or physical therapy treatments such as ultrasound to help with pain but does not fix the problem.  Discussed that this is normally a structural issue in the foot and if conservative therapy doesn't work, surgery is considered.  Distal osteotomy versus fusion.  With a distal osteotomy procedure, patient is normally minimally weight bearing in a cam boot for 6 weeks.  With fusion, patient is normally non weight bearing for 6 weeks.  With any surgery, patient needs to take the first 2 weeks off work for icing  and elevating and could possible due seated work only for the remaining 4 weeks after that.  We discussed risks of surgery including infection, neuritis, non union, need for further surgery.    Discussed that based her foot it would likely need a midfoot fusion to correct.  She would like to start with the new inserts to help with offloading with a metatarsal pad.    Discussed causes and treatments of nail fungus.  Explained that even if a culture comes back negative, a patient could still have nail fungus.  Discussed treatment options with patient and explained that there isn't one treatment that is 100% effective.  Discussed oral lamisil which is the most effective at about 70% but which can have liver effects.  Explained that if she wanted to try this that she would need serial blood draws to test her liver function.  Discussed over the counter antifungal creams.  Explained that these are about 50% effective and need to be applied once a day for about 6-8months.  Also talked about prescription penlac which is a nail laquer.  Again this is also only 50% effective.  Also discussed that if there was damage to the nail and the nail is now dystrophic that non of the above is going to change the nail.  If there was damage, there is note anything that can be done for the nail to correct it.  Discussed that if it becomes painful, we can remove the nail in clinic.    Was given another order for antifungal cream to apply to the nails.    All questions were answered to patient's satisfaction and she will call further questions or concerns.    Patient Risk Factor:  Patient is a low risk factor for infection.     Emiliana Iqbal DPM, Podiatry/Foot and Ankle Surgery      Again, thank you for allowing me to participate in the care of your patient.        Sincerely,        Emiliana Iqbal DPM, Podiatry/Foot and Ankle Surgery

## 2024-04-23 NOTE — PATIENT INSTRUCTIONS
Thank you for choosing [facility] Podiatry / Foot & Ankle Surgery!    DR HARRIS'S CLINIC:  [facility]   [address]   [place] [address]      TRIAGE LINE: [phone]  APPOINTMENTS: [phone]  RADIOLOGY: [phone]  SET UP SURGERY: [phone]  PHYSICAL THERAPY: [phone]   FAX NUMBER: [phone]  BILLING QUESTIONS: [phone]       Follow up: As needed      [facility] LOCATIONS  [facility] [place]  [address]  [place] [address]  Phone: [phone]  Fax: [phone] [facility]   [address]  [place] [address]  Phone: [phone]  Fax: [phone]   [facility] [place]  [address]  [place] [address]  Phone: [phone]  Fax: [phone] [facility]  [address]  [place] [address]  Phone: [phone]   Fax: [phone]   * Please call any location listed to make an appointment for a casting/fitting. Your referral was sent to their central office and they will all have the order on file.        IONTOPHORESIS  You have been prescribed iontophoresis therapy with physical therapy. Iontophoresis (a.k.a. Electromotive Drug Administration (EMDA)) is a technique using a small electric charge to deliver a medicine or other chemical through the skin. It is basically an injection without the needle. It is used by physical therapists to treat a variety of conditions. It is a type of electrical stimulation that is used to administer medication into your body through your skin. There are many different uses for iontophoresis. These include, but are not limited to:  Decrease inflammation, Decrease pain, Decrease muscle spasm, Decrease swelling and edema, Reduce calcium deposits in the body, Manage scar tissue, ect....    A typical iontophoresis treatment takes 10 to 20 minutes.    Before scheduling with physical therapy or  picking up the medication, please check with your insurance to understand your coverage of this service by physical therapy. You can still have this treatment if not covered by insurance, physical therapy will have you sign a waiver that you wish to proceed with treatment.  When checking with your insurance about coverage, the billing code (CPT code) they will need to check is:  56087.    Please fill prescription at a [facility] if you have difficulty getting the medication at another pharmacy.    Different Medications:    1.  Dexamethasone Sodium Phosphate, 4mg/ml injectable, 30 cc total volume.  2.  Diclofenac Sodium 1.5 % Soln, 30cc  3.  Ketoprofen Sodium 10 - 30%  4.  Naproxen Sodium 4 8 %      PLANTAR FASCIITIS  Plantar fasciitis is often referred to as heel spurs or heel pain. Plantar fasciitis is a very common problem that affects people of all foot shapes, age, weight and activity level. Pain may be in the arch or on the weight-bearing surface of the heel. The pain may come on without injury or identifiable cause. Pain is generally present when first getting out of bed in the morning or up from a seated break.     CAUSES  The plantar fascia is a dense fibrous band of tissue that stretches across the bottom surface of the foot. The fascia helps support the foot muscles and arch. Plantar fasciitis is thought to be caused by mechanical strain or overload. Frequent walking without shoes or wearing unsupportive shoes is thought to cause structural overload and ultimately inflammation of the plantar fascia. Some people have heel spurs that can be seen on x-ray. The heel spur is actually a minor component of plantar fascitis and is largely ignored.       SELF TREATMENT   The easiest solution is to stop walking around your home without shoes. Plantar fasciitis is largely a shoe problem. Shoes are either not being worn often enough or your current shoes are inadequate for your weight, foot structure or  activity level. The majority of shoes on the market today are not sufficient to resist development of plantar fasciitis or to promote healing. Assume that your current shoes are inadequate and will need to be replaced. Even high quality shoes wear out with 6 months to one year of frequent use. Weight loss is another option. Losing ten pounds in the next two months may be enough to resolve the problem. Ice applied to the area of pain two to three times per day for ten minutes each session can be very helpful. Warm foot soaks in epsom salts can also relieve pain. This should continue until the problem resolves. Achilles tendon stretching is essential. Stretch multiple times daily to promote healing and to prevent recurrence in the future. Over all stretching of the body is helpful as well such as the calves, thighs and lower back. Normally when one area of the body is tight, other areas are too. Gentle Yoga can be good for this.     Over the counter topical anti inflammatories can be helpful such as biofreeze, bengay, salon pas, ect...  Oral ibuprofen or aleve is recommended as well to try to calm down inflammation.     Night splints can be helpful to gradually stretch the foot at night as a lot of pain is when you get up in the morning. Taking a towel or thera band and stretching the foot back multiple times before you get ou of bed can be beneficial as well.     MEDICAL TREATMENT  Medical treatments often include custom arch supports, cortisone injections, physical therapy, splints to be worn in bed, prescription medications and surgery. The home treatments listed above will be necessary regardless of these advanced medical treatments. Surgery is rarely needed but is very helpful in selected cases.     PROGNOSIS  Plantar fasciitis can last from one day to a lifetime. Some people get intermittent fascitis that is very short-lived. Others suffer daily for years. Excessive body weight, frequent bare foot walking, long  hours on the feet, inadequate shoes, predisposing foot structures and excessive activity such as running are all potential issues that lead to chronic and/or recurring plantar fascitis. Having plantar fasciitis means that you are forever prone to this problem and will require modification of some of the above factors. Most people seek treatment within one to four months. Healing usually requires a similar one to four month time frame. Healing time is relative to the amount of effort spent treating the problem.   Plantar fasciitis is highly recurrent. Risk factors often continue, including return to bare foot walking, inadequate shoes, excessive body weight, excessive activities, etc. Your life style and foot structure may predispose you to recurrent plantar fasciitis. A daily prevention regimen can be very helpful. Ongoing use of shoe inserts, careful attention to appropriate shoes, daily Achilles stretching, etc. may prevent recurrence. Prompt attention at the earliest warning signs of heel pain can resolve the problem in as short as a few days.     EXERCISES  Stair Exercise: Step on the stairs with the ball of your foot and hold your position for at least 15 seconds, then slowly step down with the heels of your foot. You can do this daily and as often as you want.   Picking the Towel: Sit comfortably and then pick the towel up with your toes. You can use any object other than a towel as long as the material can be soft and you can pick it up with your toes.  Rolling the Bottle: Use a small ball or frozen water bottle and then roll it around with your foot.   Flex the Toes: Sit comfortably and then flex your toes by pointing it towards the floor or towards your body. This will relax and flex your foot and exercise your plantar fascia, the calf, and the Achilles tendon. The inability of the foot to stretch often causes the bunching up of the plantar fascia area leading to the pain.  Calf/Achilles Stretching: Lay on  you back and raise one foot, then point your toes towards the floor. See photo below:               Hold each stretch for 10 seconds. Stretch 10 times per set, three sets per day. Morning, afternoon and evening. If your heel pain is very severe in the morning, consider doing the first set of stretches before you get out of bed.      OVER THE COUNTER INSERT RECOMMENDATIONS  SuperFeet   Sofsole Fit Spenco   Power Step   Walk-Fit Arch Cradles     Most of these can be found at your local [employer], sporting [employer], or online.  **A good high quality over the counter insert should cost around $40-$50      [facility] LOCATIONS  [place]  [address]  [phone]   [address]  [phone] [place]  [address]  [phone]   [place]  [address]  [phone]   [place]  [address]  [phone] [place]  [address] [place] [address] [place]  [phone]   [place]  [address]  [phone]   [place]  [address]  [phone] [place]  [address]  [phone]        BUNION (HALLUX ABDUCTO VALGUS)  A bunion is caused by muscle imbalance. The great toe is pulled toward the smaller toes. The metatarsal head is pushed outward creating a lump on the side of your foot. Imbalance is the result of foot structure and instability.   Bunions do not improve with time. They usually enlarge, however this is a fairly slow process. Shoes do not necessarily cause bunions, however, they can hasten development and definitely cause bunions to hurt.   Bunions often run in families. We inherit a certain foot structure, which may be predisposed to bunion development.   Bunion pain is likely a combination of shoes rubbing on the bump, nerve irritation, compression between the toes, joint misalignment, arthritis and altered gait.   SYMPTOMS   Bunions are usually termed mild, moderate or severe. Just because you have a bunion does not mean you  have to have pain. There are some people with very severe bunions and no pain and people with mild bunions and a lot of pain.   - Pain on the inside of your foot at the big toe joint (1st MTPJ)   - Swelling on the inside of your foot at the big toe joint   - Redness on the inside of your foot at the big toe joint   - Numbness or burning in the big toe (hallux)   - Decreased motion at the big toe joint   - Painful bursa (fluid-filled sac) on the inside of your foot at the big toe joint   - Pain while wearing shoes -especially shoes too narrow or with high heels    - Pain during activities   - Corn in between the big toe and second toe   - Callous formation on the side or bottom of the big toe or big toe joint   - Callous under the second toe joint (2nd MTPJ)   - Pain in the second toe joint   TREATMENT  Conservative (non-surgical) treatment will not make the bunion go away, but it will hopefully decrease the signs and symptoms you have and help you get rid of the pain and get you back to your activities.   1.  Wider shoes or extra depth shoes: Most bunion pain can be improved simply by wearing compatible shoes. People with bunions cannot choose footwear simply because they like the style. Your bunion should determine which shoes are to be worn. Wide shoes with nonirritating seams,soft leather and a square toe box are most compatible with a bunion. Shoes should fit appropriately right out of the box but may need to be professionally stretched and modified to accommodate the bump. Heels, dress shoes and shoes with pointed toes will not be comfortable.   2. NSAIDs   3.  Arch supports, custom inserts, padding, splints, toe spacers : Most bunion pain can be improved simply by wearing compatible shoes. People with bunions cannot choose footwear simply because they like the style. Your bunion should determine which shoes are to be worn. Wide shoes with nonirritating seams,soft leather and a square toe box are most  compatible with a bunion. Shoes should fit appropriately right out of the box but may need to be professionally stretched and modified to accommodate the bump. Heels, dress shoes and shoes with pointed toes will not be comfortable.   4.  Change activities   5.  Physical therapy  SURGERY  Surgical treatment for bunions is sometimes needed. If you are limited by pain, cannot fit in shoes comfortably and are not able to do your daily activities then surgery may be a good option for you. There are many different surgical procedures to repair bunions. Your foot and ankle surgeon will review your foot exam findings, your x-rays, your age, your health, your lifestyle, your physical activity level and discuss with you which procedure he or she would recommend. Surgical procedures for bunions range from soft tissue repair to cutting and realigning the bones. It is not recommended that you have bunion surgery for cosmetic reasons (you do not like how your foot looks) or because you want to fit in a certain pair of shoes; There is the risk that even after surgery, the bunion will reoccur 9-10% of the time.   Bunion surgery involves cutting and repositioning the bones surrounding the bunion. Pins and screws are used to hold the bones in place during the healing process. The goal of bunion surgery is to reduce the size of the bunion bump. Realignment of the toe and joint is attempted.     Some first toes cannot be forced back into normal alignment even with surgery. Surgery is helpful in most cases but does not necessarily create a normal foot.   Healing after surgery requires about six weeks of protection. This allows the bone to heal. Maximum recovery takes about one year. The scar tissue and jOint structures require this amount of time to finish the healing process. Expect stiffness, swelling and numbness during that time frame. Bunion surgery does involve side effects. Some side effects are predictable and others are less  common but do occur. A scar will be visible and could be irritated by shoes. The shoe may rub on the screw or internal pin requiring surgical removal of these fixation devices. The screw and pin would likely be left in place for a full year. The first toe may loose motion after bunion surgery. The amount of stiffness is variable. Some people never regain normal motion of the first toe. This is due to scar tissue inherent to any surgery. The first toe may drift toward the second toe or away from the second toe. Spreading of the first and second toes is a rare occurrence after bunion surgery. This can be quite bothersome and would need to be surgically repaired. Toe drift toward the second toe could result in a recurrent bunion and revision surgery. Joint fusion is one option to correct an unstable, drifting toe. This procedure straightens the toe, however, no motion remains. Fusion may be necessary to correct complications of bunion surgery or as the original procedure in severe cases.   All surgical procedures involve risk of infection, numbness, pain, delayed healing, osteotomy dislocation, blood clots, continued foot pain, etc. Bunion surgery is quite complex and should not be taken lightly.   Any skin incision can lead to infection. Deep infection might involve the bone and thus repeat surgery and six weeks of IV antibiotics. Scar tissue can cause nerve pain or numbness. This is generally temporary but can be permanent. We do not have treatments that cure nerve problems. Second toe pain could be related to altered mechanics and pressure transferred to the second toe. Most feet with bunions have pre-existing second toe problems. Delayed bone healing would lengthen the healing time. Some bones simply do not heal. This requires repeat surgery, electronic bone stimulation and/or extended protection. Smokers have an approximate 20% chance of poor bone healing. This is double that of a non-smoker. The bone cut may  displace. This may need to be repaired with a second operation. Displacement can cause jOint malalignment. Immobility after surgery can cause blood clots in the legs and lungs. This could result in death.   Foot pain is complex. Most feet hurt for more than one reason. Fixing the bunion would not necessarily create a pain free foot. Appropriate shoes, healthy body weight, avoidance of bare foot walking and moderation of activity will always be necessary to enjoy foot comfort. Your bunion may involve arthritis, which is incurable even with surgery. Long standing bunions often involve chronic irritation to the surrounding nerves. Nerve pain may not resolve even with reducing the bunion bump since permanent nerve damage may be present   Bunion surgery is nevertheless quite successful. Most surgical patients are pleased with their foot following bunion surgery. Many of the issues described above can be controlled by taking proper care of your foot during the healing process.   Your surgeon would be happy to fully describe any of the above issues. You should pursue a full understanding of the operation,recovery process and any potential problems that could develop.   PREVENTION  1.  Do not wear high heels if there is a family history of bunions.  2.  Wear shoes that have enough width and depth in the toe box  Here are exercises that may benefit people with bunions:   Toe stretches - Stretching out your toes can help keep them limber and offset foot pain. To stretch your toes, point your toes straight ahead for 5 seconds and then curl them under for 5 seconds. Repeat these stretches 10 times. These exercises can be especially beneficial if you also have hammertoes, or chronically bent toes, in addition to a bunion.   Toe flexing and nicki - Press your toes against a hard surface such as a wall, to flex and stretch them; hold the position for 10 seconds and repeat three to four times. Then flex your toes in the  opposite direction; hold the position for 10 seconds and repeat three to four times.   Stretching your big toe - Using your fingers to gently pull your big toe over into proper alignment can be helpful as well. Hold your toe in position for 10 seconds and repeat three to four times.   Resistance exercises - Wrap either a towel or belt around your big toe and use it to pull your big toe toward you while simultaneously pushing forward, against the towel, with your big toe.   Ball roll - To massage the bottom of your foot, sit down, place a golf ball on the floor under your foot, and roll it around under your foot for two minutes. This can help relieve foot strain and cramping.   Towel curls - You can strengthen your toes by spreading out a small towel on the floor, curling your toes around it, and pulling it toward you. Repeat five times. Gripping objects with your toes like this can help keep your foot flexible.   Picking up marbles - Another gripping exercise you can perform to keep your foot flexible is picking up marbles with your toes. Do this by placing 20 marbles on the floor in front of you and use your foot to pick the marbles up one by one and place them in a bowl.   Walking along the beach - Whenever possible, spend time walking on sand. This can give you a gentle foot massage and also help strengthen your toes. This is especially beneficial for people who have arthritis associated with their bunions.

## 2024-04-23 NOTE — PROGRESS NOTES
"Podiatry / Foot and Ankle Surgery Progress Note    April 23, 2024    Subject: Patient was seen for continued pain to both feet.  Notes that she continues to have pain in the heels.  Notes that her inserts she got a few years ago did help but pain is starting to get worse.  Also notes that the toenails were doing well and then started to darken again and she is out of the antifungal cream.  She has bunions on both feet that are sore at times and is wondering what can be done for them.  Pain can be 6 out of 10 at its worst.    Objective:  Vitals: /72   Ht 1.588 m (5' 2.5\")   Wt 61.7 kg (136 lb)   BMI 24.48 kg/m    BMI= Body mass index is 24.48 kg/m .    General:  Patient is alert and orientated.  NAD.    Dermatologic: Right second toenail is darkened and thickened.     Vascular: DP & PT pulses are intact & regular bilaterally.  No significant edema or varicosities noted.  CFT and skin temperature is normal to both lower extremities.     Neurologic: Lower extremity sensation is intact to light touch.  No evidence of weakness or contracture in the lower extremities.  No evidence of neuropathy.     Musculoskeletal: Patient is ambulatory without assistive device or brace.  Medial deviation of the left first metatarsal head and lateral deviation of the left great toe.  Left toes 2 through 4 are semiflexibly contracted at the PIPJ's.  Pain on palpation of the left plantar heel as well as the posterior tibial tendon.  Pain with inversion and eversion of the foot.      Assessment:    Foot pain, bilateral  Pes cavus of both feet  Plantar fasciitis, bilateral  Achilles tendonitis, bilateral  Hav (hallux abducto valgus), right  Hav (hallux abducto valgus), left  Onychomycosis of toenail      Medical Decision Making/Plan:  The potential causes and nature of plantar fasciitis were discussed with the patient.  We reviewed the natural history/prognosis of the condition and risks if left untreated.  These include chronic " pain, other sites of pain due to gait changes, and potential plantar fascial rupture.      We discussed possible causes of the condition as it relates to the patients specific situation.      Conservative treatment options were reviewed:  appropriate shoes, avoidance of barefoot walking, inserts/orthoses, stretching, ice, massage, immobilization and NSAIDs.     We also reviewed the options of injection therapy and surgery.  However, it was made clear that surgery is only considered when conservative therapy fails.  The risks and benefits of injection therapy, and surgery were discussed.     After thorough discussion and answering all questions, the patient elected to continue inserts.  We will order this.  Also ordered physical therapy with iontophoresis to try to help with some of the pain and stretching to this area as well as the Achilles tendinitis.    Reviewed and discussed causes of bunion with patient.  Explained that it is in large part due to a patients foot type and how they walk.   Discussed treatment options with patient including orthotics, splints, pads, and shoe gear.  We discussed that sometimes cortisone injections can help with the pain or physical therapy treatments such as ultrasound to help with pain but does not fix the problem.  Discussed that this is normally a structural issue in the foot and if conservative therapy doesn't work, surgery is considered.  Distal osteotomy versus fusion.  With a distal osteotomy procedure, patient is normally minimally weight bearing in a cam boot for 6 weeks.  With fusion, patient is normally non weight bearing for 6 weeks.  With any surgery, patient needs to take the first 2 weeks off work for icing and elevating and could possible due seated work only for the remaining 4 weeks after that.  We discussed risks of surgery including infection, neuritis, non union, need for further surgery.    Discussed that based her foot it would likely need a midfoot fusion to  correct.  She would like to start with the new inserts to help with offloading with a metatarsal pad.    Discussed causes and treatments of nail fungus.  Explained that even if a culture comes back negative, a patient could still have nail fungus.  Discussed treatment options with patient and explained that there isn't one treatment that is 100% effective.  Discussed oral lamisil which is the most effective at about 70% but which can have liver effects.  Explained that if she wanted to try this that she would need serial blood draws to test her liver function.  Discussed over the counter antifungal creams.  Explained that these are about 50% effective and need to be applied once a day for about 6-8months.  Also talked about prescription penlac which is a nail laquer.  Again this is also only 50% effective.  Also discussed that if there was damage to the nail and the nail is now dystrophic that non of the above is going to change the nail.  If there was damage, there is note anything that can be done for the nail to correct it.  Discussed that if it becomes painful, we can remove the nail in clinic.    Was given another order for antifungal cream to apply to the nails.    All questions were answered to patient's satisfaction and she will call further questions or concerns.    Patient Risk Factor:  Patient is a low risk factor for infection.     Emiliana Iqbal DPM, Podiatry/Foot and Ankle Surgery

## 2024-05-01 ENCOUNTER — VIRTUAL VISIT (OUTPATIENT)
Dept: SLEEP MEDICINE | Facility: CLINIC | Age: 64
End: 2024-05-01
Attending: INTERNAL MEDICINE
Payer: COMMERCIAL

## 2024-05-01 VITALS — HEIGHT: 63 IN | HEART RATE: 91 BPM | WEIGHT: 135 LBS | BODY MASS INDEX: 23.92 KG/M2

## 2024-05-01 DIAGNOSIS — G47.26 CIRCADIAN RHYTHM SLEEP DISORDER, SHIFT WORK TYPE: ICD-10-CM

## 2024-05-01 DIAGNOSIS — G47.52 DREAM ENACTMENT BEHAVIOR: ICD-10-CM

## 2024-05-01 DIAGNOSIS — R51.9 ACUTE NONINTRACTABLE HEADACHE, UNSPECIFIED HEADACHE TYPE: ICD-10-CM

## 2024-05-01 DIAGNOSIS — M79.7 FIBROMYALGIA: ICD-10-CM

## 2024-05-01 DIAGNOSIS — G47.9 SLEEP DISTURBANCE: Primary | ICD-10-CM

## 2024-05-01 DIAGNOSIS — F51.12 INSUFFICIENT SLEEP SYNDROME: ICD-10-CM

## 2024-05-01 DIAGNOSIS — R53.83 OTHER FATIGUE: ICD-10-CM

## 2024-05-01 DIAGNOSIS — R03.0 ELEVATED BP WITHOUT DIAGNOSIS OF HYPERTENSION: ICD-10-CM

## 2024-05-01 DIAGNOSIS — R06.83 SNORING: ICD-10-CM

## 2024-05-01 PROCEDURE — 99205 OFFICE O/P NEW HI 60 MIN: CPT | Mod: 95 | Performed by: INTERNAL MEDICINE

## 2024-05-01 ASSESSMENT — SLEEP AND FATIGUE QUESTIONNAIRES
HOW LIKELY ARE YOU TO NOD OFF OR FALL ASLEEP WHILE SITTING AND TALKING TO SOMEONE: WOULD NEVER DOZE
HOW LIKELY ARE YOU TO NOD OFF OR FALL ASLEEP WHILE SITTING QUIETLY AFTER LUNCH WITHOUT ALCOHOL: WOULD NEVER DOZE
HOW LIKELY ARE YOU TO NOD OFF OR FALL ASLEEP WHILE SITTING INACTIVE IN A PUBLIC PLACE: WOULD NEVER DOZE
HOW LIKELY ARE YOU TO NOD OFF OR FALL ASLEEP WHILE WATCHING TV: SLIGHT CHANCE OF DOZING
HOW LIKELY ARE YOU TO NOD OFF OR FALL ASLEEP IN A CAR, WHILE STOPPED FOR A FEW MINUTES IN TRAFFIC: WOULD NEVER DOZE
HOW LIKELY ARE YOU TO NOD OFF OR FALL ASLEEP WHILE SITTING AND READING: SLIGHT CHANCE OF DOZING
HOW LIKELY ARE YOU TO NOD OFF OR FALL ASLEEP WHILE LYING DOWN TO REST IN THE AFTERNOON WHEN CIRCUMSTANCES PERMIT: SLIGHT CHANCE OF DOZING
HOW LIKELY ARE YOU TO NOD OFF OR FALL ASLEEP WHEN YOU ARE A PASSENGER IN A CAR FOR AN HOUR WITHOUT A BREAK: WOULD NEVER DOZE

## 2024-05-01 ASSESSMENT — PAIN SCALES - GENERAL: PAINLEVEL: SEVERE PAIN (6)

## 2024-05-01 NOTE — PROGRESS NOTES
Virtual Visit Details    Type of service:  Video Visit     Originating Location (pt. Location): Home    Distant Location (provider location):  Off-site  Platform used for Video Visit: [facility]    Outpatient Sleep Medicine Consultation:      Name: Goldie Paul MRN# [id number]   Age: 63 year old YOB: 1960     Date of Consultation: May 1, 2024  Consultation is requested by: Misbah Negrete MD  [address] [place] [address] Misbah Negrete  Primary care provider: Steven Garcia       Reason for Sleep Consult:     Goldie Paul is sent by Misbah Negrete for a sleep consultation regarding snoring, to obtain evaluation for sleep disordered breathing.    Patient s Reason for visit  Goldie Paul main reason for visit: Snoring  Patient states problem(s) started: 3 years ago  Goldie Paul's goals for this visit: Decrease the snoring and get better slerp           Assessment and Plan:     Summary Sleep Diagnoses:  Snoring  Non restorative sleep Fatigue  Shift work  Insufficient sleep time  Dream enactment behavior     Comorbid Diagnoses:  Elevated BP without diagnosis of HTN  HLD  GERD  Allergic rhinitis  Fibromyalgia      Summary Recommendations:  Snoring, non-restorative sleep, fatigue. Possible MARK  HST/ Polysomnography reviewed.  Patient was interested in HST. Orders generated in [facility] for for HST to evaluate for sleep disordered breathing.  If home sleep study is negative for sleep disordered breathing, we will consider obtaining in-lab sleep study for further evaluation and she was agreeable with the plan.  Discussed pathophysiology and risks of untreated MARK.  Information provided regarding treatment options for MARK.  Patient was amenable to treatment using CPAP device  Patient will follow up 3 months after the sleep study. We will review results and initiate treatment (if indicated) over [facility] prior to the follow up.     Abnormal sleep-related behaviors: Dream enactment behavior.  The  dream enactment behavior could be due to possible untreated MARK or Cymbalta or RBD. Patient was instructed to continue to monitor for future episodes. Unlikely related to Cymbalta since she reported she has been on the medication for a long time and only noticed episodes in the last year  We discussed briefly about RBD and association of RBD with neurodegenerative conditions such as Parkinson's disease.  We discussed safety measures of sleep/bed environment to prevent trauma to self/partner:  Keeping padding by the side of the bed   Keeping lower mattress to the floor and/or use a ground-floor bedroom  Secure doors and windows (locks, alarms, barriers)  Remove sharp and dangerous objects from bedroom, including firearms    Shift work, and concern for insufficient sleep time  We discussed the consequence of insufficient sleep. Recommended to prioritize sleep, aim to increase sleep duration to 7-8 hours of sleep /day and provided tips as summary to promote sleep.    --Patient was instructed to reduced the melatonin to 3 mg prn instead of the 10 mg she has been taking.   --She will follow with her PCP for management of the fibromyalgia.  Pregabalin can also be associated with fatigue including EDS.  --We discussed weight management with healthy diet, and exercise.  --Patient was strongly advised to avoid driving, operating any heavy machinery or other hazardous situations while drowsy or sleepy.  Patient was counseled on the importance of driving while alert, to pull over if drowsy, or nap before getting into the vehicle if sleepy.         Orders Placed This Encounter   Procedures    HST-Home Sleep Apnea Test - Noxturnal Returnable         Summary Counseling:    Sleep Testing Reviewed  Obstructive Sleep Apnea Reviewed  Complications of Untreated Sleep Apnea Reviewed       Medical Decision-making:   Educational materials provided in instructions      CC: Misbah Negrete MD       The above note was dictated using voice  "recognition software. Although reviewed after completion, some word and grammatical error may remain . Please contact the author for any clarifications.        \" Total time spent was 70 minutes for this appointment on this date of service which include time spent before, during and after the visit for chart review, patient care, counseling and coordination of care including documentation.\"      Ramona Lomas MD  [facility]  [address] , [place] [address]              History of Present Illness:     Past Sleep Evaluations: none    SLEEP-WAKE SCHEDULE:     Work/School Days: Patient goes to school/work: Yes (days /evening shifts)   When she works day shift: she gets into bed at 9 pm -10pm  Takes patient about 30 mins or so to fall asleep  Has trouble falling asleep 2-3 times nights per week  Wakes up in the middle of the night 1-2 times.  Wakes up due to Snorting self awake;Pain;Use the bathroom  She has trouble falling back asleep 3-3 times a week.   It usually takes 30-1 hr to get back to sleep  Patient is usually up at 5am    Uses alarm: Yes    When she works evening shift, she returns home between 12:45-1am, goes to bed  around 1:30 am  and wakes up between 7-8 am  Uses alarm: Yes      Weekends/Non-work Days/All Other Days:  Usually gets into bed at   10pm  Patient is usually up at  7-8:30am  Uses alarm: No    She does not always feel rested upon awakening from sleep  Sleep Need  Patient gets  5-7  sleep on average   Patient thinks she needs about   sleep    Goldie AGAPITO Paul prefers to sleep in this position(s):   side sleeper  Patient states they do the following activities in bed:  read, watch TV in bed    Naps  Patient  takes a purposeful nap occasionally if she has to work in the evening  She feels better after a nap:  Yes  She dozes off unintentionally 0  days per week  Patient has had a driving accident or near-miss due to sleepiness/drowsiness:  No, " unless working late at night rarely felt tired but there are no reports of close calls/accidents      SLEEP DISRUPTIONS:    Breathing/Snoring  Patient snores: Yes  Other people complain about her snoring: Yes  Patient has been told she stops breathing in her sleep:No  She has issues with the following:  morning dry mouth, occasional morning headaches, heart burn at night-takes omeprazole, some times wakes up to use the bathroom >1 at night    Movement:   She reports Leg cramps and plantar fasciitis. Denies RLS symptoms(I explained the RLS symptoms and she denies RLS symptoms)     Behaviors in Sleep:  Goldie Paul has experienced the following behaviors while sleeping: occasional Sleep talking  There are no reports of sleep walking or sleep eating  She reports rare episodes of dream enactment behavior in the last year- examples: She woke up dreaming that a  snake bit her on feet and she   jumped  in bed. Another example was dream that a dog was attacking her and she jumped and screamed.  There are no reports of motor activity or falling out of bed or trauma to self/partner during these episodes  Sensation of smell : Normal  She has experienced sudden muscle weakness during the day:  no      Is there anything else you would like your sleep provider to know:          CAFFEINE AND OTHER SUBSTANCES:    Patient consumes caffeinated beverages per day:   1  Last caffeine use is usually:  2pm  List of any prescribed or over the counter stimulants that patient takes:  no  List of any prescribed or over the counter sleep medication patient takes:  melatonin 10 mg   List of previous sleep medications that patient has tried:  none  Patient drinks alcohol to help them sleep:  no  Patient drinks alcohol near bedtime:  no    Family History:  Patient has a family member been diagnosed with a sleep disorder:  mom martínez(not tested for MARK)            SCALES:    EPWORTH SLEEPINESS SCALE         5/1/2024    10:42 AM    [place]  Sleepiness Scale ( JUJU Gómez  [id number]<br>ESS - [place]/English - Final version - 21 Nov 07 - [facility] [place].)   Sitting and reading Slight chance of dozing   Watching TV Slight chance of dozing   Sitting, inactive in a public place (e.g. a theatre or a meeting) Would never doze   As a passenger in a car for an hour without a break Would never doze   Lying down to rest in the afternoon when circumstances permit Slight chance of dozing   Sitting and talking to someone Would never doze   Sitting quietly after a lunch without alcohol Would never doze   In a car, while stopped for a few minutes in traffic Would never doze   [place] Score (MC) 3   [place] Score (Sleep) 3         INSOMNIA SEVERITY INDEX (ROGELIO)          4/24/2024     9:04 AM   Insomnia Severity Index (ROGELIO)   Difficulty falling asleep 1   Difficulty staying asleep 2   Problems waking up too early 2   How SATISFIED/DISSATISFIED are you with your CURRENT sleep pattern? 3   How NOTICEABLE to others do you think your sleep problem is in terms of impairing the quality of your life? 1   How WORRIED/DISTRESSED are you about your current sleep problem? 1   To what extent do you consider your sleep problem to INTERFERE with your daily functioning (e.g. daytime fatigue, mood, ability to function at work/daily chores, concentration, memory, mood, etc.) CURRENTLY? 1   ROGELIO Total Score 11       Guidelines for Scoring/Interpretation:  Total score categories:  0-7 = No clinically significant insomnia   8-14 = Subthreshold insomnia   15-21 = Clinical insomnia (moderate severity)  22-28 = Clinical insomnia (severe)  Used via courtesy of www.QponDirectth.va.gov with permission from Sonu Oates PhD., [facility]      STOP BANG 3/8           5/1/2024    10:39 AM   STOP BANG Questionnaire (  2008, the American Society of Anesthesiologists, Inc. [employer].)   BMI Clinic: 23.91         GAD7        6/17/2021     5:41 PM   CHALO-7    1. Feeling  "nervous, anxious, or on edge 0   2. Not being able to stop or control worrying 0   3. Worrying too much about different things 0   4. Trouble relaxing 0   5. Being so restless that it is hard to sit still 0   6. Becoming easily annoyed or irritable 0   7. Feeling afraid, as if something awful might happen 0   CHALO-7 Total Score 0   If you checked any problems, how difficult have they made it for you to do your work, take care of things at home, or get along with other people? Not difficult at all         CAGE-AID         No data to display                CAGE-AID reprinted with permission from the [place] Medical Journal, DIXON Hernandez. and HORTENCIA Madden, \"Conjoint screening questionnaires for alcohol and drug abuse\" [place] Medical Journal 94: 135-140, 1995.      PATIENT HEALTH QUESTIONNAIRE-9 (PHQ - 9)        6/17/2021     5:41 PM   PHQ-9 ([employer])   1.  Little interest or pleasure in doing things 0   2.  Feeling down, depressed, or hopeless 0   3.  Trouble falling or staying asleep, or sleeping too much 1   4.  Feeling tired or having little energy 1   5.  Poor appetite or overeating 0   6.  Feeling bad about yourself - or that you are a failure or have let yourself or your family down 0   7.  Trouble concentrating on things, such as reading the newspaper or watching television 0   8.  Moving or speaking so slowly that other people could have noticed. Or the opposite - being so fidgety or restless that you have been moving around a lot more than usual 0   9.  Thoughts that you would be better off dead, or of hurting yourself in some way 0   PHQ-9 Total Score 2   If you checked off any problems, how difficult have these problems made it for you to do your work, take care of things at home, or get along with other people? Not difficult at all   6.  Feeling bad about yourself 0   7.  Trouble concentrating 0   8.  Moving slowly or restless 0   9.  Suicidal or self-harm thoughts 0   Difficulty at work, home, or with " people Not difficult at all       Developed by Octavia Young, Krysten De La Paz, Jamie Guzman and colleagues, with an educational sylvain from [employer] No permission required to reproduce, translate, display or distribute.        Allergies:    No Known Allergies    Medications:    Current Outpatient Medications   Medication Sig Dispense Refill    ciclopirox (LOPROX) 0.77 % cream Apply topically daily 30 g 6    ciclopirox (LOPROX) 0.77 % cream Apply topically daily 30 g 6    cyclobenzaprine (FLEXERIL) 5 MG tablet TAKE ONE OR TWO TABLETS (5-10 mg) BY MOUTH AT BEDTIME AS NEEDED for back pain or spasms 180 tablet 1    dexAMETHasone (DECADRON) 4 MG/ML injection Apply 1 mL (4 mg) topically See Admin Instructions For physical therapy, iontophoresis 30 mL 0    DULoxetine (CYMBALTA) 30 MG capsule Take 1 capsule (30 mg) by mouth 2 times daily 180 capsule 2    estradiol (VIVELLE-DOT) 0.05 MG/24HR bi-weekly patch APPLY 1 PATCH topically TO CLEAN, DRY, HAIRLESS SKIN TWICE WEEKLY. REMOVE OLD PATCH BEFORE APPLYING A NEW ONE. 24 patch 0    melatonin 3 MG tablet Take 1 tablet (3 mg) by mouth nightly as needed for sleep 100 tablet 3    omeprazole (PRILOSEC) 20 MG DR capsule Take 1 capsule (20 mg) by mouth daily 90 capsule 3    order for DME Orthotics for both feet 1 Package 2    pregabalin (LYRICA) 75 MG capsule TAKE 1 CAPSULE (75 mg) BY MOUTH THREE TIMES DAILY 270 capsule 1       Problem List:  Patient Active Problem List    Diagnosis Date Noted    Elevated BP without diagnosis of hypertension 01/28/2024     Priority: Medium    Family history of malignant neoplasm of breast 06/29/2022     Priority: Medium     Sister, diagnosed at age 44  Goldie has annual mammograms, declines genetic counseling evaluation      Polyp of colon 10/01/2021     Priority: Medium    Cervical radiculopathy 11/12/2020     Priority: Medium    Restless leg syndrome 09/16/2020     Priority: Medium    Hyperlipidemia LDL goal <100 05/03/2020      Priority: Medium    Seborrheic dermatitis      Priority: Medium    S/P hysterectomy with oophorectomy 01/03/2017     Priority: Medium    Insomnia, unspecified insomnia 02/08/2016     Priority: Medium    Gastroesophageal reflux disease without esophagitis 10/05/2015     Priority: Medium    Fibromyalgia 10/05/2015     Priority: Medium    Chronic pain syndrome 09/14/2014     Priority: Medium     Due to fibromyalgia. Chronic pain contract signed 9/12/14. Symptoms stable with use of tramadol, gabapentin and ibuprofen.  Pt to have f/u with MD every 6 mos      Plantar fascial fibromatosis 01/13/2006     Priority: Medium    Allergic rhinitis 01/13/2006     Priority: Medium     Problem list name updated by automated process. Provider to review      Other specified idiopathic peripheral neuropathy 01/13/2006     Priority: Medium        Past Medical/Surgical History:  Past Medical History:   Diagnosis Date    Allergic rhinitis, cause unspecified     Disorders of bursae and tendons in shoulder region, unspecified 01/2006    right    Endometriosis of other specified sites     Esophageal reflux     FIBROMYALGIA     Neg Rheum work-up    Hyperlipidemia     Impingement syndrome of left shoulder     s/p cortisone injection    Insomnia 12/23/2011    Lateral epicondylitis  of elbow 02/2006    left    Plantar fascial fibromatosis     Polyneuropathy in other diseases classified elsewhere (H24)     Radial styloid tenosynovitis     bilateral    Seborrheic dermatitis     Sprain of hand, unspecified site 01/2006    right thumb MCP joint    Uncomplicated asthma      Past Surgical History:   Procedure Laterality Date    ABDOMEN SURGERY  01/01/1992    hysterectomy    APPENDECTOMY  01/01/1977    COLONOSCOPY  2018    SOFT TISSUE SURGERY      [facility] NONSPECIFIC PROCEDURE  01/01/2004    Plantar fasciotomy    [facility] NONSPECIFIC PROCEDURE  01/01/1992    DAVID, BSO for endometriosis       Social History:  Social History     Socioeconomic History    Marital  status:      Spouse name: Not on file    Number of children: Not on file    Years of education: Not on file    Highest education level: Not on file   Occupational History     Employer: [employer]      Comment: nurse   Tobacco Use    Smoking status: Never    Smokeless tobacco: Never   Vaping Use    Vaping status: Never Used   Substance and Sexual Activity    Alcohol use: No    Drug use: No    Sexual activity: Yes     Partners: Male     Birth control/protection: Post-menopausal     Comment: Hyst   Other Topics Concern    Parent/sibling w/ CABG, MI or angioplasty before 65F 55M? No   Social History Narrative    Not on file     Social Determinants of Health     Financial Resource Strain: Low Risk  (1/23/2024)    Financial Resource Strain     Within the past 12 months, have you or your family members you live with been unable to get utilities (heat, electricity) when it was really needed?: No   Food Insecurity: Low Risk  (1/23/2024)    Food Insecurity     Within the past 12 months, did you worry that your food would run out before you got money to buy more?: No     Within the past 12 months, did the food you bought just not last and you didn t have money to get more?: No   Transportation Needs: Low Risk  (1/23/2024)    Transportation Needs     Within the past 12 months, has lack of transportation kept you from medical appointments, getting your medicines, non-medical meetings or appointments, work, or from getting things that you need?: No   Physical Activity: Not on file   Stress: Not on file   Social Connections: Not on file   Interpersonal Safety: Not on file   Housing Stability: Low Risk  (1/23/2024)    Housing Stability     Do you have housing? : Yes     Are you worried about losing your housing?: No       Family History:  Family History   Problem Relation Age of Onset    Hypertension Mother     Coronary Artery Disease Father     Parkinsonism Father     Coronary Stenting Father     Breast Cancer  "Sister         dx age 44    Diabetes No family hx of     Colon Cancer No family hx of     Ovarian Cancer No family hx of        Review of Systems:  A complete review of systems reviewed by me is negative with the exeption of what has been mentioned in the history of present illness.        Physical Examination:  Vitals: Pulse 91   Ht 1.6 m (5' 3\")   Wt 61.2 kg (135 lb)   BMI 23.91 kg/m    BMI= Body mass index is 23.91 kg/m .    General: No apparent distress, appropriately groomed  Head: Normocephalic, atraumatic  Neck:Circumference: 15 inches  Chest: No cough, no audible wheezing, able to talk in full sentences.   Psych: coherent speech, normal rate and volume, able to articulate logical thoughts, able   to abstract reason, no tangential thoughts, no hallucinations   or delusions   Her affect is normal  Neuro:  Mental status: Alert and  Oriented X 3  Speech: normal            Data: All pertinent previous laboratory data reviewed     Recent Labs   Lab Test 09/08/23  1004 06/17/21  1834   * 135   POTASSIUM 4.3 4.2   CHLORIDE 100 103   CO2 26 30   ANIONGAP 9 2*   * 89   BUN 9.9 13   CR 0.58 0.64   OMID 9.4 9.2       Recent Labs   Lab Test 06/17/21  1834   WBC 7.1   RBC 4.59   HGB 13.8   HCT 41.1   MCV 90   MCH 30.1   MCHC 33.6   RDW 13.6          Recent Labs   Lab Test 09/08/23  1004   PROTTOTAL 7.2   ALBUMIN 4.2   BILITOTAL 0.3   ALKPHOS 109*   AST 29   ALT 32       TSH   Date Value   09/08/2023 1.74 uIU/mL   07/18/2019 2.36 mU/L   06/05/2018 1.14 mU/L       No results found for: \"UAMP\", \"UBARB\", \"BENZODIAZEUR\", \"UCANN\", \"UCOC\", \"OPIT\", \"UPCP\"    Iron Saturation Index   Date/Time Value Ref Range Status   09/01/2020 10:20 AM 30 15 - 46 % Final     Ferritin   Date/Time Value Ref Range Status   09/01/2020 10:20 AM 48 8 - 252 ng/mL Final       No results found for: \"PH\", \"PHARTERIAL\", \"PO2\", \"DB5WADSJNFC\", \"SAT\", \"PCO2\", \"HCO3\", \"BASEEXCESS\", \"JP\", \"BEB\"       Echocardiography: No results found for " this or any previous visit (from the past 4320 hour(s)).    Chest x-ray: No results found for this or any previous visit from the past 365 days.      Chest CT: No results found for this or any previous visit from the past 365 days.      PFT: Most Recent [place] Pulmonary Function Testing: No results found          Ramona Lomas MD 5/1/2024

## 2024-05-01 NOTE — NURSING NOTE
Is the patient currently in the state of [place]? YES    Visit mode:VIDEO    If the visit is dropped, the patient can be reconnected by: VIDEO VISIT: Send to e-mail at: [email or web address]    Will anyone else be joining the visit? NO  (If patient encounters technical issues they should call [phone]    How would you like to obtain your AVS? MyChart    Are changes needed to the allergy or medication list? Pt stated no changes to allergies and Pt stated no med changes    Are refills needed on medications prescribed by this physician? NO  Has patient had flu shot for current/most recent flu season? If so, when? Yes: 10/12/2023    Reason for visit: Consult    Sandy ZAFAR

## 2024-05-01 NOTE — PATIENT INSTRUCTIONS
"          MY TREATMENT INFORMATION FOR SLEEP APNEA-  Goldie Paul    DOCTOR : Ramona Lomas MD    Am I having a home sleep study?  --->Watch the video for the device you are using:    -/drop off device-   [email or web address]/watch?v=yGGFBdELGhk       Frequently asked questions:  1. What is Obstructive Sleep Apnea (MARK)? MARK is the most common type of sleep apnea. Apnea means, \"without breath.\"  Apnea is most often caused by narrowing or collapse of the upper airway as muscles relax during sleep.   Almost everyone has occasional apneas. Most people with sleep apnea have had brief interruptions at night frequently for many years.  The severity of sleep apnea is related to how frequent and severe the events are.   2. What are the consequences of MARK? Symptoms include: feeling sleepy during the day, snoring loudly, gasping or stopping of breathing, trouble sleeping, and occasionally morning headaches or heartburn at night.  Sleepiness can be serious and even increase the risk of falling asleep while driving. Other health consequences may include development of high blood pressure and other cardiovascular disease in persons who are susceptible. Untreated MARK  can contribute to heart disease, stroke and diabetes.   3. What are the treatment options? In most situations, sleep apnea is a lifelong disease that must be managed with daily therapy. Medications are not effective for sleep apnea and surgery is generally not considered until other therapies have been tried. Your treatment is your choice . Continuous Positive Airway (CPAP) works right away and is the therapy that is effective in nearly everyone. An oral device to hold your jaw forward is usually the next most reliable option. Other options include postioning devices (to keep you off your back), weight loss, and surgery including a tongue pacing device. There is more detail about some of these options below.  4. Are my sleep " studies covered by insurance? Although we will request verification of coverage, we advise you also check in advance of the study to ensure there is coverage.    Important tips for those choosing CPAP and similar devices  REMEMBER-IF YOU RECEIVE A CALL FROM  [phone]-->IT IS TO SETUP A DEVICE  For new devices, sign up for device KATHRIN to monitor your device for your followup visits  We encourage you to utilize the [employer] kathrin or website ( https://Vidcaster.Surplex/ ) to monitor your therapy progress and share the data with your healthcare team when you discuss your sleep apnea.                                                    Know your equipment:  CPAP is continuous positive airway pressure that prevents obstructive sleep apnea by keeping the throat from collapsing while you are sleeping. In most cases, the device is  smart  and can slowly self-adjusts if your throat collapses and keeps a record every day of how well you are treated-this information is available to you and your care team.  BPAP is bilevel positive airway pressure that keeps your throat open and also assists each breath with a pressure boost to maintain adequate breathing.  Special kinds of BPAP are used in patients who have inadequate breathing from lung or heart disease. In most cases, the device is  smart  and can slowly self-adjusts to assist breathing. Like CPAP, the device keeps a record of how well you are treated.  Your mask is your connection to the device. You get to choose what feels most comfortable and the staff will help to make sure if fits. Here: are some examples of the different masks that are available: Magnetic mask aids may assist with use but there are safety issues that should be addressed when considering with magnets* ( see end of discussion).       Key points to remember on your journey with sleep apnea:  Sleep study.  PAP devices often need to be adjusted during a sleep study to show that they are effective and  adjusted right.  Good tips to remember: Try wearing just the mask during a quiet time during the day so your body adapts to wearing it. A humidifier is recommended for comfort in most cases to prevent drying of your nose and throat. Allergy medication from your provider may help you if you are having nasal congestion.  Getting settled-in. It takes more than one night for most of us to get used to wearing a mask. Try wearing just the mask during a quiet time during the day so your body adapts to wearing it. A humidifier is recommended for comfort in most cases. Our team will work with you carefully on the first day and will be in contact within 4 days and again at 2 and 4 weeks for advice and remote device adjustments. Your therapy is evaluated by the device each day.   Use it every night. The more you are able to sleep naturally for 7-8 hours, the more likely you will have good sleep and to prevent health risks or symptoms from sleep apnea. Even if you use it 4 hours it helps. Occasionally all of us are unable to use a medical therapy, in sleep apnea, it is not dangerous to miss one night.   Communicate. Call our skilled team on the number provided on the first day if your visit for problems that make it difficult to wear the device. Over 2 out of 3 patients can learn to wear the device long-term with help from our team. Remember to call our team or your sleep providers if you are unable to wear the device as we may have other solutions for those who cannot adapt to mask CPAP therapy. It is recommended that you sleep your sleep provider within the first 3 months and yearly after that if you are not having problems.   Use it for your health. We encourage use of CPAP masks during daytime quiet periods to allow your face and brain to adapt to the sensation of CPAP so that it will be a more natural sensation to awaken to at night or during naps. This can be very useful during the first few weeks or months of adapting to  CPAP though it does not help medically to wear CPAP during wakefulness and  should not be used as a strategy just to meet guidelines.  Take care of your equipment. Make sure you clean your mask and tubing using directions every day and that your filter and mask are replaced as recommended or if they are not working.     *Masks with magnets:  Updated Contraindications  Masks with magnetic components are contraindicated for use by patients where they, or anyone in close physical contact while using the mask, have the following:   Active medical implants that interact with magnets (i.e., pacemakers, implantable cardioverter defibrillators (ICD), neurostimulators, cerebrospinal fluid (CSF) shunts, insulin/infusion pumps)   Metallic implants/objects containing ferromagnetic material (i.e., aneurysm clips/flow disruption devices, embolic coils, stents, valves, electrodes, implants to restore hearing or balance with implanted magnets, ocular implants, metallic splinters in the eye)  Updated Warning  Keep the mask magnets at a safe distance of at least 6 inches (150 mm) away from implants or medical devices that may be adversely affected by magnetic interference. This warning applies to you or anyone in close physical contact with your mask. The magnets are in the frame and lower headgear clips, with a magnetic field strength of up to 400mT. When worn, they connect to secure the mask but may inadvertently detach while asleep.  Implants/medical devices, including those listed within contraindications, may be adversely affected if they change function under external magnetic fields or contain ferromagnetic materials that attract/repel to magnetic fields (some metallic implants, e.g., contact lenses with metal, dental implants, metallic cranial plates, screws, marva hole covers, and bone substitute devices). Consult your physician and  of your implant / other medical device for information on the potential adverse  effects of magnetic fields.    BESIDES CPAP, WHAT OTHER THERAPIES ARE THERE?    Positioning Device  Positioning devices are generally used when sleep apnea is mild and only occurs on your back.This example shows a pillow that straps around the waist. It may be appropriate for those whose sleep study shows milder sleep apnea that occurs primarily when lying flat on one's back. Preliminary studies have shown benefit but effectiveness at home may need to be verified by a home sleep test. These devices are generally not covered by medical insurance.  Examples of devices that maintain sleeping on the back to prevent snoring and mild sleep apnea.    Belt type body positioner  http://Foodzie.Startupi/    Electronic reminder  http://nightshifttherapy.com/            Oral Appliance  What is oral appliance therapy?  An oral appliance device fits on your teeth at night like a retainer used after having braces. The device is made by a specialized dentist and requires several visits over 1-2 months before a manufactured device is made to fit your teeth and is adjusted to prevent your sleep apnea. Once an oral device is working properly, snoring should be improved. A home sleep test may be recommended at that time if to determine whether the sleep apnea is adequately treated.       Some things to remember:  -Oral devices are often, but not always, covered by your medical insurance. Be sure to check with your insurance provider.   -If you are referred for oral therapy, you will be given a list of specialized dentists to consider or you may choose to visit the Web site of the American Academy of Dental Sleep Medicine  -Oral devices are less likely to work if you have severe sleep apnea or are extremely overweight.     More detailed information  An oral appliance is a small acrylic device that fits over the upper and lower teeth  (similar to a retainer or a mouth guard). This device slightly moves jaw forward, which moves the base of  the tongue forward, opens the airway, improves breathing for effective treat snoring and obstructive sleep apnea in perhaps 7 out of 10 people .  The best working devices are custom-made by a dental device  after a mold is made of the teeth 1, 2, 3.  When is an oral appliance indicated?  Oral appliance therapy is recommended as a first-line treatment for patients with primary snoring, mild sleep apnea, and for patients with moderate sleep apnea who prefer appliance therapy to use of CPAP4, 5. Severity of sleep apnea is determined by sleep testing and is based on the number of respiratory events per hour of sleep.   How successful is oral appliance therapy?  The success rate of oral appliance therapy in patients with mild sleep apnea is 75-80% while in patients with moderate sleep apnea it is 50-70%. The chance of success in patients with severe sleep apnea is 40-50%. The research also shows that oral appliances have a beneficial effect on the cardiovascular health of MARK patients at the same magnitude as CPAP therapy7.  Oral appliances should be a second-line treatment in cases of severe sleep apnea, but if not completely successful then a combination therapy utilizing CPAP plus oral appliance therapy may be effective. Oral appliances tend to be effective in a broad range of patients although studies show that the patients who have the highest success are females, younger patients, those with milder disease, and less severe obesity. 3, 6.   Finding a dentist that practices dental sleep medicine  Specific training is available through the American Academy of Dental Sleep Medicine for dentists interested in working in the field of sleep. To find a dentist who is educated in the field of sleep and the use of oral appliances, near you, visit the Web site of the American Academy of Dental Sleep Medicine.    References  1. Ana et al. Objectively measured vs self-reported compliance during oral  appliance therapy for sleep-disordered breathing. Chest 2013; 144(5): 9238-6531.  2. Ashley, et al. Objective measurement of compliance during oral appliance therapy for sleep-disordered breathing. Thorax 2013; 68(1): 91-96.  3. Isac et al. Mandibular advancement devices in 620 men and women with MARK and snoring: tolerability and predictors of treatment success. Chest 2004; 125: 6621-5697.  4. Savannah, et al. Oral appliances for snoring and MARK: a review. Sleep 2006; 29: 244-262.  5. Christian et al. Oral appliance treatment for MARK: an update. J Clin Sleep Med 2014; 10(2): 215-227.  6. Hugo et al. Predictors of OSAH treatment outcome. J Dent Res 2007; 86: 0629-0197.      Weight Loss:   Your Body mass index is 23.91 kg/m .    Being overweight does not necessarily mean you will have health consequences.  Those who have BMI over 35 or over 27 with existing medical conditions carries greater risk.   Weight loss decreases severity of sleep apnea in most people with obesity. For those with mild obesity who have developed snoring with weight gain, even 15-30 pound weight loss can improve and occasionally milder eliminate sleep apnea.  Structured and life-long dietary and health habits are necessary to lose weight and keep healthier weight levels.     The Comprehensive Weight loss program offers all aspects of weight loss strategies including two Non-Surgical Weight Loss Programs: Medical Weight Management and our 24 Week Healthy Lifestyle Program:    Medical Weight Management: You will meet with a Medical Weight Management Provider, as well as a Registered Dietician. The program may include medication therapy, dietary education, recommended exercise and physical therapy programs, monthly support group meetings, and possible psychological counseling. Follow up visits with the provider or dietician are scheduled based on your progress and needs.    24 Week Healthy Lifestyle Program: This unique program is  designed to give you the support of weekly appointments and activities thru a 24-week period. It may include all of the components of the basic program (above), with the addition of 11 individual Health  Visits, 24-week access to the [employer] website for over 700 online classes, and monthly support group meetings. This program has an out-of-pocket expense of $499 to cover the items that can not be billed to insurance (health coaches and v[employer]ogo access), and is non-refundable/non-transferable (you may be able to use a Health Savings Account; ask your HSA provider). There may be an optional meal replacement plan prescribed as well.   Surgical management achieves meaningful long-term weight loss and improvement in health risks in most patients with more severe obesity.      Sleep Apnea Surgery:    Surgery for obstructive sleep apnea is considered generally only when other therapies fail to work. Surgery may be discussed with you if you are having a difficult time tolerating CPAP and or when there is an abnormal structure that requires surgical correction.  Nose and throat surgeries often enlarge the airway to prevent collapse.  Most of these surgeries create pain for 1-2 weeks and up to half of the most common surgeries are not effective throughout life.  You should carefully discuss the benefits and drawbacks to surgery with your sleep provider and surgeon to determine if it is the best solution for you.   More information  Surgery for MARK is directed at areas that are responsible for narrowing or complete obstruction of the airway during sleep.  There are a wide range of procedures available to enlarge and/or stabilize the airway to prevent blockage of breathing in the three major areas where it can occur: the palate, tongue, and nasal regions.  Successful surgical treatment depends on the accurate identification of the factors responsible for obstructive sleep apnea in each person.  A personalized approach  is required because there is no single treatment that works well for everyone.  Because of anatomic variation, consultation with an examination by a sleep surgeon is a critical first step in determining what surgical options are best for each patient.  In some cases, examination during sedation may be recommended in order to guide the selection of procedures.  Patients will be counseled about risks and benefits as well as the typical recovery course after surgery. Surgery is typically not a cure for a person s MARK.  However, surgery will often significantly improve one s MARK severity (termed  success rate ).  Even in the absence of a cure, surgery will decrease the cardiovascular risk associated with OSA7; improve overall quality of life8 (sleepiness, functionality, sleep quality, etc).      Palate Procedures:  Patients with MARK often have narrowing of their airway in the region of their tonsils and uvula.  The goals of palate procedures are to widen the airway in this region as well as to help the tissues resist collapse.  Modern palate procedure techniques focus on tissue conservation and soft tissue rearrangement, rather than tissue removal.  Often the uvula is preserved in this procedure. Residual sleep apnea is common in patient after pharyngoplasty with an average reduction in sleep apnea events of 33%2.      Tongue Procedures:  ExamWhile patients are awake, the muscles that surround the throat are active and keep this region open for breathing. These muscles relax during sleep, allowing the tongue and other structures to collapse and block breathing.  There are several different tongue procedures available.  Selection of a tongue base procedure depends on characteristics seen on physical exam.  Generally, procedures are aimed at removing bulky tissues in this area or preventing the back of the tongue from falling back during sleep.  Success rates for tongue surgery range from 50-62%3.    Hypoglossal Nerve  Stimulation:  Hypoglossal nerve stimulation has recently received approval from the [place] Food and Drug Administration for the treatment of obstructive sleep apnea.  This is based on research showing that the system was safe and effective in treating sleep apnea6.  Results showed that the median AHI score decreased 68%, from 29.3 to 9.0. This therapy uses an implant system that senses breathing patterns and delivers mild stimulation to airway muscles, which keeps the airway open during sleep.  The system consists of three fully implanted components: a small generator (similar in size to a pacemaker), a breathing sensor, and a stimulation lead.  Using a small handheld remote, a patient turns the therapy on before bed and off upon awakening.    Candidates for this device must be greater than 18 years of age, have moderate to severe obstructive sleep apnea with less than 25% central events  (AHI between 15-65), BMI less than 35, have tried CPAP/oral appliance for at least 8 weeks without success, and have appropriate upper airway anatomy (determined by a sleep endoscopy performed by Dr. Ricardo Christian or Dr. Augstin Woodruff).    Nasal Procedures:  Nasal obstruction can interfere with nasal breathing during the day and night.  Studies have shown that relief of nasal obstruction can improve the ability of some patients to tolerate positive airway pressure therapy for obstructive sleep apnea1.  Treatment options include medications such as nasal saline, topical corticosteroid and antihistamine sprays, and oral medications such as antihistamines or decongestants. Non-surgical treatments can include external nasal dilators for selected patients. If these are not successful by themselves, surgery can improve the nasal airway either alone or in combination with these other options.        Combination Procedures:  Combination of surgical procedures and other treatments may be recommended, particularly if patients have more  than one area of narrowing or persistent positional disease.  The success rate of combination surgery ranges from 66-80%2,3.    References  Karthikeyan MACHADO. The Role of the Nose in Snoring and Obstructive Sleep Apnoea: An Update.  Eur Arch Otorhinolaryngol. 2011; 268: 1365-73.   Savanah SM; Mary JA; Myla JR; Pallanch JF; Meche MB; Shazia SG; Belkis ECKERT. Surgical modifications of the upper airway for obstructive sleep apnea in adults: a systematic review and meta-analysis. SLEEP 2010;33(10):1000-6452. Janel HARRY. Hypopharyngeal surgery in obstructive sleep apnea: an evidence-based medicine review.  Arch Otolaryngol Head Neck Surg. 2006 Feb;132(2):206-13.  Nilton YH1, Gela Y, Ulysses MARIZA. The efficacy of anatomically based multilevel surgery for obstructive sleep apnea. Otolaryngol Head Neck Surg. 2003 Oct;129(4):327-35.  Kezirian E, Goldberg A. Hypopharyngeal Surgery in Obstructive Sleep Apnea: An Evidence-Based Medicine Review. Arch Otolaryngol Head Neck Surg. 2006 Feb;132(2):206-13.  Efren PJ et al. Upper-Airway Stimulation for Obstructive Sleep Apnea.  N Engl J Med. 2014 Jan 9;370(2):139-49.  Javy Y et al. Increased Incidence of Cardiovascular Disease in Middle-aged Men with Obstructive Sleep Apnea. Am J Respir Crit Care Med; 2002 166: 159-165  Guillen EM et al. Studying Life Effects and Effectiveness of Palatopharyngoplasty (SLEEP) study: Subjective Outcomes of Isolated Uvulopalatopharyngoplasty. Otolaryngol Head Neck Surg. 2011; 144: 623-631.        WHAT IF I ONLY HAVE SNORING?    Mandibular advancement devices, lateral sleep positioning, long-term weight loss and treatment of nasal allergies have been shown to improve snoring.  Exercising tongue muscles with a game (https://apps.Anapsis/us/kathrin/soundly-reduce-snoring/kl5651702293) or stimulating the tongue during the day with a device (https://doi.org/10.3390/ssq73168066) have improved snoring in some  individuals.  https://www.Cont3nt.com.SixDoors/  https://www.sleepfoundation.org/best-anti-snoring-mouthpieces-and-mouthguards    Remember to Drive Safe... Drive Alive     Sleep health profoundly affects your health, mood, and your safety.  Thirty three percent of the population (one in three of us) is not getting enough sleep and many have a sleep disorder. Not getting enough sleep or having an untreated / undertreated sleep condition may make us sleepy without even knowing it. In fact, our driving could be dramatically impaired due to our sleep health. As your provider, here are some things I would like you to know about driving:     Here are some warning signs for impairment and dangerous drowsy driving:              -Having been awake more than 16 hours               -Looking tired               -Eyelid drooping              -Head nodding (it could be too late at this point)              -Driving for more than 30 minutes     Some things you could do to make the driving safer if you are experiencing some drowsiness:              -Stop driving and rest              -Call for transportation              -Make sure your sleep disorder is adequately treated     Some things that have been shown NOT to work when experiencing drowsiness while driving:              -Turning on the radio              -Opening windows              -Eating any  distracting  /  entertaining  foods (e.g., sunflower seeds, candy, or any other)              -Talking on the phone      Your decision may not only impact your life, but also the life of others. Please, remember to drive safe for yourself and all of us.    Dream enactment behavior:  The dream enactment behavior could be due to possible untreated MARK or Cymbalta or REM sleep behavior disorder(RBD). Please continue to monitor for future episodes. Unlikely related to Cymbalta since you reported you have been on the medication for a long time and only noticed episodes in the last year         We discussed safety measures of sleep/bed environment to prevent trauma to self/partner:  Keeping padding by the side of the bed   Keeping lower mattress to the floor and/or use a ground-floor bedroom  Secure doors and windows (locks, alarms, barriers)  Remove sharp and dangerous objects from bedroom, including firearms    Shift work, and concern for insufficient sleep time  We discussed the consequence of insufficient sleep. Recommended to prioritize sleep, aim to increase sleep duration to 7-8 hours of sleep /day and please review these tips below to promote sleep.   Tips for Better Sleep:    Adjusting to shift work and a new sleeping routine takes time. Remember, shift work impacts each person differently. Finding a routine that works best for you may take some trial and error. Try these practical tips to help you improve your sleep and better cope with shift work:    Keep a consistent sleep schedule. If you work the night shift, try staying on the same sleep schedule even on your days off. For rotating shift workers, try adjusting your sleep time before a schedule change to prevent extreme disruption to your sleep.  Do not delay going to bed. As soon as your night shift is over go home to sleep. The longer you put off sleep the more likely you are to feel awake due to your body s internal clock.  Create the right sleep environment. Make sure your bedroom is quiet, dark and at a cool temperature. If your bedtime is during the day, try using a sleep mask, blackout curtains, earplugs and/or a noise canceling machine or kathrin for better sleep.  Manage screen and light exposure. If it is daylight when you finish your shift wear a pair of sunglasses to limit sun exposure on your way home. If possible, avoid screen time before bed since the blue light can make it harder to fall asleep.  Share your sleep schedule. If you live with family or roommates, let them know when you will be working and when you need to rest.  Setting boundaries can help prevent conflict and sleep disruptions.  Nap strategically. Whether you have a bed available or just your rucksack, napping can be a great tool to improve performance and combat sleep loss. Try taking a nap before your night shift to reduce sleepiness and increase alertness while on the job.  Watch what you drink before bed. Avoid caffeine and alcohol close to bedtime. While you may find yourself falling asleep faster after a drink, alcohol causes you to wake up more in the night and disrupts sleep quality.  Eat light snacks at night. Avoid heavy meals during the night shift since your digestion and metabolism are less efficient at that time. Instead, choose light snacks and hydrate to help avoid drowsiness and digestion problems.  Finding Help  It is important to take your sleep health seriously. Not getting enough sleep can hurt your mission readiness and make it challenging to cope with daily stressors.      --Please  reduce the melatonin to 3 mg prn instead of the 10 mg   --please  follow with your PCP for management of the fibromyalgia.    --Please follow healthy diet, and exercise regularly.

## 2024-05-20 ENCOUNTER — DOCUMENTATION ONLY (OUTPATIENT)
Dept: PODIATRY | Facility: CLINIC | Age: 64
End: 2024-05-20
Payer: COMMERCIAL

## 2024-05-20 DIAGNOSIS — M72.2 PLANTAR FASCIITIS, BILATERAL: Primary | ICD-10-CM

## 2024-05-20 NOTE — PROGRESS NOTES
Order signed.     Emiliana Iqbal DPM  AACNIKKO, AACPM  [facility] Podiatry/Foot and Ankle Surgery  [phone]

## 2024-07-09 ENCOUNTER — TRANSFERRED RECORDS (OUTPATIENT)
Dept: HEALTH INFORMATION MANAGEMENT | Facility: CLINIC | Age: 64
End: 2024-07-09
Payer: COMMERCIAL

## 2024-07-17 ENCOUNTER — TRANSFERRED RECORDS (OUTPATIENT)
Dept: HEALTH INFORMATION MANAGEMENT | Facility: CLINIC | Age: 64
End: 2024-07-17
Payer: COMMERCIAL

## 2024-07-18 DIAGNOSIS — K21.9 GASTROESOPHAGEAL REFLUX DISEASE WITHOUT ESOPHAGITIS: ICD-10-CM

## 2024-07-26 ASSESSMENT — SLEEP AND FATIGUE QUESTIONNAIRES
HOW LIKELY ARE YOU TO NOD OFF OR FALL ASLEEP WHILE SITTING AND READING: SLIGHT CHANCE OF DOZING
HOW LIKELY ARE YOU TO NOD OFF OR FALL ASLEEP WHEN YOU ARE A PASSENGER IN A CAR FOR AN HOUR WITHOUT A BREAK: SLIGHT CHANCE OF DOZING
HOW LIKELY ARE YOU TO NOD OFF OR FALL ASLEEP WHILE SITTING QUIETLY AFTER LUNCH WITHOUT ALCOHOL: WOULD NEVER DOZE
HOW LIKELY ARE YOU TO NOD OFF OR FALL ASLEEP WHILE SITTING INACTIVE IN A PUBLIC PLACE: WOULD NEVER DOZE
HOW LIKELY ARE YOU TO NOD OFF OR FALL ASLEEP WHILE SITTING AND TALKING TO SOMEONE: WOULD NEVER DOZE
HOW LIKELY ARE YOU TO NOD OFF OR FALL ASLEEP WHILE WATCHING TV: SLIGHT CHANCE OF DOZING
HOW LIKELY ARE YOU TO NOD OFF OR FALL ASLEEP WHILE LYING DOWN TO REST IN THE AFTERNOON WHEN CIRCUMSTANCES PERMIT: SLIGHT CHANCE OF DOZING
HOW LIKELY ARE YOU TO NOD OFF OR FALL ASLEEP IN A CAR, WHILE STOPPED FOR A FEW MINUTES IN TRAFFIC: WOULD NEVER DOZE

## 2024-07-29 DIAGNOSIS — M79.7 FIBROMYALGIA: ICD-10-CM

## 2024-07-30 ENCOUNTER — OFFICE VISIT (OUTPATIENT)
Dept: SLEEP MEDICINE | Facility: CLINIC | Age: 64
End: 2024-07-30
Attending: INTERNAL MEDICINE
Payer: COMMERCIAL

## 2024-07-30 ENCOUNTER — DOCUMENTATION ONLY (OUTPATIENT)
Dept: SLEEP MEDICINE | Facility: CLINIC | Age: 64
End: 2024-07-30

## 2024-07-30 DIAGNOSIS — G47.9 SLEEP DISTURBANCE: ICD-10-CM

## 2024-07-30 PROCEDURE — G0399 HOME SLEEP TEST/TYPE 3 PORTA: HCPCS | Mod: 52 | Performed by: INTERNAL MEDICINE

## 2024-07-30 NOTE — PROGRESS NOTES
Pt is completing a home sleep test. Pt was instructed on how to put on the Noxturnal T3 device and associated equipment before going to bed and given the opportunity to practice putting it on before leaving the sleep center. Pt was reminded to bring the home sleep test kit back to the center tomorrow, at agreed upon time for download and reporting.   Neck circumference: 36 CM / 14.25 inches.

## 2024-07-31 ENCOUNTER — TRANSFERRED RECORDS (OUTPATIENT)
Dept: HEALTH INFORMATION MANAGEMENT | Facility: CLINIC | Age: 64
End: 2024-07-31

## 2024-07-31 ENCOUNTER — DOCUMENTATION ONLY (OUTPATIENT)
Dept: SLEEP MEDICINE | Facility: CLINIC | Age: 64
End: 2024-07-31
Payer: COMMERCIAL

## 2024-07-31 NOTE — PROGRESS NOTES
HST POST-STUDY QUESTIONNAIRE    What time did you go to bed?  9:50pm  How long do you think it took to fall asleep?  45min  What time did you wake up to start the day?  6:25am  Did you get up during the night at all?  no  If you woke up, do you remember approximately what time(s)? N/a  Did you have any difficulty with the equipment?  No  Did you us any type of treatment with this study?  None  Was the head of the bed elevated? No  Did you sleep in a recliner?  No  Did you stop using CPAP at least 3 days before this test?  NA  Any other information you'd like us to know? N/a

## 2024-08-07 NOTE — PROGRESS NOTES
This HSAT was performed using a Noxturnal T3 device which recorded snore, sound, movement activity, body position, nasal pressure, oronasal thermal airflow, pulse, oximetry and both chest and abdominal respiratory effort. HSAT data was restricted to the time patient states they were in bed.     HSAT was scored using 1B 4% hypopnea rule.     HST AHI (Non-PAT): 9.2  Snoring was reported as mild.  Time with SpO2 below 89% was 4.3 minutes.   Overall signal quality was good     Pt will follow up with sleep provider to determine appropriate therapy.

## 2024-08-13 NOTE — PROCEDURES
"HOME SLEEP STUDY INTERPRETATION        Patient: Goldie Paul  MRN: [id number]  YOB: 1960  Study Date: 7/30/2024  PCP/Referring Provider: Steven Garcia MD  Ordering Provider: Ramona Lomas MD       Indications for Home Study: Goldie Paul is a 64 year old female with a history of elevated BP without diagnosis of HTN, hyperlipidemia, GERD, allergy rhinitis, fibromyalgia who presents with symptoms suggestive of obstructive sleep apnea.    Estimated body mass index is 23.91 kg/m  as calculated from the following:    Height as of 5/1/24: 1.6 m (5' 3\").    Weight as of 5/1/24: 61.2 kg (135 lb).  Total score - [place]: 4 (7/26/2024  8:50 AM)  STOP-BANG: 3/8        Data: A full night home sleep study was performed recording the standard physiologic parameters including body position, movement, sound, nasal pressure, thermal oral airflow, chest and abdominal movements with respiratory inductance plethysmography, and oxygen saturation by pulse oximetry. Pulse rate was estimated by oximetry recording. This study was considered adequate based on > 4 hours of quality oximetry and respiratory recording. As specified by the AASM Manual for the Scoring of Sleep and Associated events, version 2.3, Rule VIII.D 1B, 4% oxygen desaturation scoring for hypopneas is used as a standard of care on all home sleep apnea testing.        Analysis Time: 304.3 minutes        Respiration:   Sleep Associated Hypoxemia: sustained hypoxemia was not present.  Average oxygen saturation was 93.1%.  Time with saturation less than or equal to 89% was 4.3 minutes. The lowest oxygen saturation was 84%.   Snoring: Snoring was present.  Respiratory events: The home study revealed a presence of 8 obstructive apneas and 0 mixed and central apneas. There were 40 hypopneas resulting in a combined apnea/hypopnea index [AHI] of 9.5 events per hour.  AHI was 20.3 per hour supine, n/a prone, 0.4 per hour on left " side, and 21.2 per hour on right side.   Pattern: Excluding events noted above, respiratory rate and pattern was Normal.      Position: Percent of time spent: supine -9.7%, prone -0%, on left -55.9%, on right -34.4%.      Heart Rate: By pulse oximetry, the average pulse rate was normal at 78 bpm.  The minimum pulse rate was 69 bpm and the maximum pulse rate was 100 bpm.      Assessment:   Mild obstructive sleep apnea was present without sleep associated hypoxemia..    Recommendations:  Consider auto-CPAP at 5-15 cmH2O or oral appliance therapy through referral to sleep dentistry to control snoring and obstructive sleep apnea.  Suggest optimizing sleep hygiene and avoiding sleep deprivation.  Weight management.      Diagnosis Code(s): Obstructive Sleep Apnea G47.33, Snoring R06.83    Electronically signed by: Ramona Lomas MD, August 13, 2024   Diplomate, American Board of Internal Medicine, Sleep Medicine

## 2024-09-03 DIAGNOSIS — N95.1 SYMPTOMATIC MENOPAUSAL OR FEMALE CLIMACTERIC STATES: ICD-10-CM

## 2024-09-04 ENCOUNTER — MYC MEDICAL ADVICE (OUTPATIENT)
Dept: INTERNAL MEDICINE | Facility: CLINIC | Age: 64
End: 2024-09-04
Payer: COMMERCIAL

## 2024-09-04 DIAGNOSIS — M79.7 FIBROMYALGIA: ICD-10-CM

## 2024-09-04 RX ORDER — DULOXETIN HYDROCHLORIDE 30 MG/1
30 CAPSULE, DELAYED RELEASE ORAL 2 TIMES DAILY
Qty: 180 CAPSULE | Refills: 0 | Status: SHIPPED | OUTPATIENT
Start: 2024-09-04

## 2024-09-06 ENCOUNTER — TELEPHONE (OUTPATIENT)
Dept: PODIATRY | Facility: CLINIC | Age: 64
End: 2024-09-06
Payer: COMMERCIAL

## 2024-09-06 NOTE — TELEPHONE ENCOUNTER
Received the following:    Goldie Paul Podiatry Rn  Phone Number: [phone]     Refills have been requested for the following medications:        ciclopirox (LOPROX) 0.77 % cream [Emiliana Iqbal]    Preferred pharmacy: [facility] [address] [place] [address]

## 2024-09-06 NOTE — TELEPHONE ENCOUNTER
Medication Request for: Ciclopirox 0.77% cream            Prescription last written on 4/23/24 by Dr. Iqbal   Sig: apply topically daily   Last Fill Quantity: 30g with # refills: 6     Last Office Visit by provider: 4/23/24    Phone call to [facility]. Patient has 6 refills remaining. Asked that they get one ready for patient.     [employer] message sent to patient.     JENNIFER William RN

## 2024-09-12 NOTE — TELEPHONE ENCOUNTER
Pharmacy called for update re: refill request for estradiol. Writer noted that PCP listed in chart is not within [facility]. Medication was last ordered by Dr. Negrete. Pt last saw Dr. Negrete in January 2024.    Re-routing to Dr. Negrete to review and order if appropriate.    Jodie Chadwick RN on 9/12/2024 at 12:54 PM

## 2024-09-17 DIAGNOSIS — N95.1 SYMPTOMATIC MENOPAUSAL OR FEMALE CLIMACTERIC STATES: ICD-10-CM

## 2024-09-17 NOTE — TELEPHONE ENCOUNTER
Fax received from [facility] pharmacy. 3 e-prescribe requests and 2 phone calls from pharmacy have been placed. Patient still needing refill on estradiol patch. Please advise

## 2024-09-18 NOTE — TELEPHONE ENCOUNTER
Estrogen patch last filled Dec 2023 for 3 mos so unclear if pt not using for past 6 mos or if getting through other provider.  Chart shoes that pt has been getting Lyrica from Dr Garcia in August and September this year and saw her in her private clinic in April and July 2024 so unclear if pt has transferred care to her. Since Lyrica is controlled substance, should not be getting from multiple doctors so did not prescribe now until could clarify with pt who she wishes to be managing her care. Tried to call pt. NA. CARRI I called and will try calling her again tomorrow afternoon. Chart shows that PCP was changed to  Dr Garcia in April 2024

## 2024-09-19 RX ORDER — ESTRADIOL 0.05 MG/D
PATCH, EXTENDED RELEASE TRANSDERMAL
Qty: 24 PATCH | Refills: 3 | Status: SHIPPED | OUTPATIENT
Start: 2024-09-19

## 2024-09-19 RX ORDER — ESTRADIOL 0.05 MG/D
PATCH, EXTENDED RELEASE TRANSDERMAL
Refills: 0 | OUTPATIENT
Start: 2024-09-19

## 2024-09-19 NOTE — TELEPHONE ENCOUNTER
Spoke with pt. Hx hysterectomy long ago. States she did not transfer care to Dr Garcia and only saw her as a functional medicine provider for consultation and wishes for me to remain as PCP. PCP changed back. Estrogen Rx refilled. Denies any vaginal bleeding and mammogram UTD

## 2024-10-13 ENCOUNTER — HEALTH MAINTENANCE LETTER (OUTPATIENT)
Age: 64
End: 2024-10-13

## 2024-10-27 RX ORDER — PREGABALIN 75 MG/1
CAPSULE ORAL
Qty: 270 CAPSULE | Refills: 0 | OUTPATIENT
Start: 2024-10-27

## 2024-11-07 ENCOUNTER — TRANSFERRED RECORDS (OUTPATIENT)
Dept: HEALTH INFORMATION MANAGEMENT | Facility: CLINIC | Age: 64
End: 2024-11-07
Payer: COMMERCIAL

## 2024-12-09 ENCOUNTER — OFFICE VISIT (OUTPATIENT)
Dept: INTERNAL MEDICINE | Facility: CLINIC | Age: 64
End: 2024-12-09
Payer: COMMERCIAL

## 2024-12-09 VITALS
HEART RATE: 89 BPM | BODY MASS INDEX: 24.01 KG/M2 | HEIGHT: 63 IN | SYSTOLIC BLOOD PRESSURE: 127 MMHG | TEMPERATURE: 97.2 F | OXYGEN SATURATION: 97 % | DIASTOLIC BLOOD PRESSURE: 75 MMHG | WEIGHT: 135.5 LBS

## 2024-12-09 DIAGNOSIS — M79.7 FIBROMYALGIA: Primary | ICD-10-CM

## 2024-12-09 DIAGNOSIS — R03.0 ELEVATED BP WITHOUT DIAGNOSIS OF HYPERTENSION: ICD-10-CM

## 2024-12-09 DIAGNOSIS — R73.9 BLOOD GLUCOSE ELEVATED: ICD-10-CM

## 2024-12-09 DIAGNOSIS — G47.33 OSA (OBSTRUCTIVE SLEEP APNEA): ICD-10-CM

## 2024-12-09 DIAGNOSIS — E04.9 ENLARGED THYROID: ICD-10-CM

## 2024-12-09 DIAGNOSIS — G89.29 CHRONIC NONINTRACTABLE HEADACHE, UNSPECIFIED HEADACHE TYPE: ICD-10-CM

## 2024-12-09 DIAGNOSIS — R51.9 CHRONIC NONINTRACTABLE HEADACHE, UNSPECIFIED HEADACHE TYPE: ICD-10-CM

## 2024-12-09 LAB
ANION GAP SERPL CALCULATED.3IONS-SCNC: 7 MMOL/L (ref 7–15)
BUN SERPL-MCNC: 10.6 MG/DL (ref 8–23)
CALCIUM SERPL-MCNC: 9.3 MG/DL (ref 8.8–10.4)
CHLORIDE SERPL-SCNC: 100 MMOL/L (ref 98–107)
CREAT SERPL-MCNC: 0.62 MG/DL (ref 0.51–0.95)
EGFRCR SERPLBLD CKD-EPI 2021: >90 ML/MIN/1.73M2
ERYTHROCYTE [SEDIMENTATION RATE] IN BLOOD BY WESTERGREN METHOD: 9 MM/HR (ref 0–30)
EST. AVERAGE GLUCOSE BLD GHB EST-MCNC: 120 MG/DL
GLUCOSE SERPL-MCNC: 92 MG/DL (ref 70–99)
HBA1C MFR BLD: 5.8 % (ref 0–5.6)
HCO3 SERPL-SCNC: 28 MMOL/L (ref 22–29)
POTASSIUM SERPL-SCNC: 4.6 MMOL/L (ref 3.4–5.3)
SODIUM SERPL-SCNC: 135 MMOL/L (ref 135–145)
TSH SERPL DL<=0.005 MIU/L-ACNC: 3.22 UIU/ML (ref 0.3–4.2)

## 2024-12-09 PROCEDURE — 84443 ASSAY THYROID STIM HORMONE: CPT | Performed by: INTERNAL MEDICINE

## 2024-12-09 PROCEDURE — 85652 RBC SED RATE AUTOMATED: CPT | Performed by: INTERNAL MEDICINE

## 2024-12-09 PROCEDURE — 36415 COLL VENOUS BLD VENIPUNCTURE: CPT | Performed by: INTERNAL MEDICINE

## 2024-12-09 PROCEDURE — 80048 BASIC METABOLIC PNL TOTAL CA: CPT | Performed by: INTERNAL MEDICINE

## 2024-12-09 PROCEDURE — 83036 HEMOGLOBIN GLYCOSYLATED A1C: CPT | Performed by: INTERNAL MEDICINE

## 2024-12-09 PROCEDURE — 99214 OFFICE O/P EST MOD 30 MIN: CPT | Performed by: INTERNAL MEDICINE

## 2024-12-09 NOTE — PROGRESS NOTES
ASSESSMENT:    1. Chronic nonintractable headache, unspecified headache type  Possibly related to untreated sleep apnea.  Rare dizziness but normal examination intact today.  Given chronicity of headache issues, will check MRI of the brain.  Rule out TA with ESR.  Encourage patient to start CPAP therapy to see if helps sx.      - MR Brain w/o & w Contrast; Future  - ESR: Erythrocyte sedimentation rate; Future  - ESR: Erythrocyte sedimentation rate    2. MARK (obstructive sleep apnea)  Reviewed prior sleep clinic study. Had combined apnea/hypopnea index [AHI] of 9.5 events per hour. But AHI was 20.3 per hour supine and sats down to 84% so ? Contributing to headache sx along with chronic fibromyalgia, fatigue.  Encouraged patient to contact sleep clinic to start CPAP therapy     3. Blood glucose elevated  Prior fasting glucose 106.  Counseled regarding carbohydrate reduction.  Labs ordered  - Hemoglobin A1c; Future  - Basic metabolic panel; Future  - Hemoglobin A1c  - Basic metabolic panel    4. Elevated BP without diagnosis of hypertension  Intermittent blood pressure elevations at home but normal today.  Possibly related to untreated sleep apnea.  Hold off on blood pressure medication for now and start CPAP therapy  - Basic metabolic panel; Future  - Basic metabolic panel    5. Enlarged thyroid  Mild prominent thyroid on examination.  Check thyroid ultrasound and thyroid function  - US Thyroid; Future  - TSH with free T4 reflex; Future  - TSH with free T4 reflex    6. Fibromyalgia (Primary)  Chronic.  Continue Lyrica.  Treatment for sleep apnea as above to potentially help        PLAN:  [facility] at [phone] to start CPAP therapy as previously recommended in MyChart to patient from sleep clinic August 2024  Labs today as ordered  MRI brain and Thyroid ultrasound. [facility]  will call you to schedule. If you have not heard from their schedulers within 2 business days, then call [phone]  (radiology scheduling)  Continue current medications for now  Further management regarding headaches will be made after MRI results available to review and contact with sleep clinic completed by patient in regards to starting CPAP therapy.  In the meantime, Tylenol Extra Strength 1 to 2 tablets up to 3 times a day as needed for headache pain               Subjective   Goldie is a 64 year old, presenting for the following health issues:  Headache       History of Present Illness       Reason for visit:  Elevated BP with headcaches of and on  Symptom onset:  More than a month  Symptoms include:  Headache  Symptom intensity:  Mild  Symptom progression:  Staying the same  Had these symptoms before:  No  What makes it worse:  No  What makes it better:  Advil   She is taking medications regularly.       Most recent lab results reviewed with pt.      Chronic headaches dating back months.  States she has not had a headache only 2 days of the past 2 weeks.  Pain is mostly along the top of her head.  Has some chronic neck pain that is part of her fibromyalgia but no radicular symptoms into the upper extremities.  No gait abnormality.  Had an upper respiratory infection with negative COVID test last week that caused some mild dizziness for a day but that has resolved denies current URI symptoms.  Rare caffeine.  Only drinking green tea.  Has contacts and had an eye exam done 5 months ago which was normal per patient.  Patient overall okay though slight reduction with nighttime vision.  Blood pressures at home sometimes normal limits sometimes ranging in the 140-150/75-low 90 range.  Denies chest pain, shortness of breath, abdominal pain.  Coordination has been okay.  Chronic generalized fibromyalgia pain.  Has some mild fatigue.  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      OBJECTIVE:  /75   Pulse 89   Temp 97.2  F (36.2  C) (Temporal)   Ht 1.6 m (5' 3\")   Wt 61.5 kg (135 lb 8 oz)   SpO2 97%   BMI 24.00 kg/m     Estimated body mass index is 24 kg/m  as calculated from the following:    Height as of this encounter: 1.6 m (5' 3\").    Weight as of this encounter: 61.5 kg (135 lb 8 oz).  Eye: PERRL, EOMI  HENT: ear canals and TM's normal and nose and mouth without ulcers or lesions. No TA or TMJ area tenderness to palpation  Neck: no adenopathy. Thyroid mildly enlarged to palpation bilaterally without distinct nodule palpable.  No bruits  Pulm: Lungs clear to auscultation   CV: Regular rates and rhythm  GI: Soft, nontender, Normal active bowel sounds, No hepatosplenomegaly or masses palpable  Ext: Peripheral pulses intact. No edema.  Neuro: Normal strength and tone, sensory exam grossly normal except for hypersensitivity to palpation global fibromyalgia trigger points.  Normal finger-nose-finger.  Equivocal Romberg.  Normal gait      (Chart documentation was completed, in part, with [employer] voice-recognition software. Even though reviewed, some grammatical, spelling, and word errors may remain.)    Misbah Negrete MD  Internal Medicine Department  [facility]            "

## 2024-12-10 ENCOUNTER — MYC MEDICAL ADVICE (OUTPATIENT)
Dept: INTERNAL MEDICINE | Facility: CLINIC | Age: 64
End: 2024-12-10

## 2024-12-11 NOTE — PATIENT INSTRUCTIONS
Contact [facility] at [phone] to start CPAP therapy as previously recommended in MyChart to patient from sleep clinic August 2024  Labs today as ordered  MRI brain and Thyroid ultrasound. [facility]  will call you to schedule. If you have not heard from their schedulers within 2 business days, then call [phone] (radiology scheduling)  Continue current medications for now  Further management regarding headaches will be made after MRI results available to review and contact with sleep clinic completed by patient in regards to starting CPAP therapy.  In the meantime, Tylenol Extra Strength 1 to 2 tablets up to 3 times a day as needed for headache pain

## 2024-12-18 ENCOUNTER — HOSPITAL ENCOUNTER (EMERGENCY)
Facility: CLINIC | Age: 64
Discharge: HOME | End: 2024-12-18
Attending: EMERGENCY MEDICINE | Admitting: EMERGENCY MEDICINE
Payer: COMMERCIAL

## 2024-12-18 ENCOUNTER — OFFICE VISIT (OUTPATIENT)
Dept: URGENT CARE | Facility: URGENT CARE | Age: 64
End: 2024-12-18
Payer: COMMERCIAL

## 2024-12-18 VITALS
OXYGEN SATURATION: 98 % | DIASTOLIC BLOOD PRESSURE: 78 MMHG | SYSTOLIC BLOOD PRESSURE: 134 MMHG | TEMPERATURE: 98 F | HEIGHT: 63 IN | RESPIRATION RATE: 18 BRPM | BODY MASS INDEX: 23.87 KG/M2 | WEIGHT: 134.7 LBS | HEART RATE: 87 BPM

## 2024-12-18 VITALS
SYSTOLIC BLOOD PRESSURE: 164 MMHG | TEMPERATURE: 98.1 F | OXYGEN SATURATION: 96 % | HEART RATE: 103 BPM | DIASTOLIC BLOOD PRESSURE: 100 MMHG

## 2024-12-18 DIAGNOSIS — R42 VERTIGO: ICD-10-CM

## 2024-12-18 DIAGNOSIS — H60.502 ACUTE OTITIS EXTERNA OF LEFT EAR, UNSPECIFIED TYPE: Primary | ICD-10-CM

## 2024-12-18 DIAGNOSIS — R42 DIZZINESS: ICD-10-CM

## 2024-12-18 LAB
ANION GAP SERPL CALCULATED.3IONS-SCNC: 11 MMOL/L (ref 7–15)
BASOPHILS # BLD AUTO: 0.1 10E3/UL (ref 0–0.2)
BASOPHILS NFR BLD AUTO: 1 %
BUN SERPL-MCNC: 8 MG/DL (ref 8–23)
CALCIUM SERPL-MCNC: 9.4 MG/DL (ref 8.8–10.4)
CHLORIDE SERPL-SCNC: 101 MMOL/L (ref 98–107)
CREAT SERPL-MCNC: 0.59 MG/DL (ref 0.51–0.95)
EGFRCR SERPLBLD CKD-EPI 2021: >90 ML/MIN/1.73M2
EOSINOPHIL # BLD AUTO: 0.1 10E3/UL (ref 0–0.7)
EOSINOPHIL NFR BLD AUTO: 2 %
ERYTHROCYTE [DISTWIDTH] IN BLOOD BY AUTOMATED COUNT: 13.5 % (ref 10–15)
GLUCOSE SERPL-MCNC: 95 MG/DL (ref 70–99)
HCO3 SERPL-SCNC: 25 MMOL/L (ref 22–29)
HCT VFR BLD AUTO: 43.3 % (ref 35–47)
HGB BLD-MCNC: 14.2 G/DL (ref 11.7–15.7)
IMM GRANULOCYTES # BLD: 0.1 10E3/UL
IMM GRANULOCYTES NFR BLD: 1 %
LYMPHOCYTES # BLD AUTO: 2.5 10E3/UL (ref 0.8–5.3)
LYMPHOCYTES NFR BLD AUTO: 31 %
MCH RBC QN AUTO: 28.4 PG (ref 26.5–33)
MCHC RBC AUTO-ENTMCNC: 32.8 G/DL (ref 31.5–36.5)
MCV RBC AUTO: 87 FL (ref 78–100)
MONOCYTES # BLD AUTO: 0.4 10E3/UL (ref 0–1.3)
MONOCYTES NFR BLD AUTO: 5 %
NEUTROPHILS # BLD AUTO: 5 10E3/UL (ref 1.6–8.3)
NEUTROPHILS NFR BLD AUTO: 62 %
NRBC # BLD AUTO: 0 10E3/UL
NRBC BLD AUTO-RTO: 0 /100
PLATELET # BLD AUTO: 280 10E3/UL (ref 150–450)
POTASSIUM SERPL-SCNC: 4.2 MMOL/L (ref 3.4–5.3)
RBC # BLD AUTO: 5 10E6/UL (ref 3.8–5.2)
SODIUM SERPL-SCNC: 137 MMOL/L (ref 135–145)
TROPONIN T SERPL HS-MCNC: <6 NG/L
WBC # BLD AUTO: 8.1 10E3/UL (ref 4–11)

## 2024-12-18 PROCEDURE — 85025 COMPLETE CBC W/AUTO DIFF WBC: CPT | Performed by: EMERGENCY MEDICINE

## 2024-12-18 PROCEDURE — 36415 COLL VENOUS BLD VENIPUNCTURE: CPT | Performed by: EMERGENCY MEDICINE

## 2024-12-18 PROCEDURE — 93005 ELECTROCARDIOGRAM TRACING: CPT

## 2024-12-18 PROCEDURE — 80048 BASIC METABOLIC PNL TOTAL CA: CPT | Performed by: EMERGENCY MEDICINE

## 2024-12-18 PROCEDURE — 99285 EMERGENCY DEPT VISIT HI MDM: CPT | Mod: 25

## 2024-12-18 PROCEDURE — 255N000002 HC RX 255 OP 636: Performed by: EMERGENCY MEDICINE

## 2024-12-18 PROCEDURE — 85004 AUTOMATED DIFF WBC COUNT: CPT | Performed by: EMERGENCY MEDICINE

## 2024-12-18 PROCEDURE — A9585 GADOBUTROL INJECTION: HCPCS | Performed by: EMERGENCY MEDICINE

## 2024-12-18 PROCEDURE — 250N000011 HC RX IP 250 OP 636: Performed by: EMERGENCY MEDICINE

## 2024-12-18 PROCEDURE — 85041 AUTOMATED RBC COUNT: CPT | Performed by: EMERGENCY MEDICINE

## 2024-12-18 PROCEDURE — 84484 ASSAY OF TROPONIN QUANT: CPT | Performed by: EMERGENCY MEDICINE

## 2024-12-18 PROCEDURE — 99214 OFFICE O/P EST MOD 30 MIN: CPT | Performed by: PHYSICIAN ASSISTANT

## 2024-12-18 RX ORDER — MECLIZINE HYDROCHLORIDE 25 MG/1
25 TABLET ORAL 3 TIMES DAILY PRN
Qty: 15 TABLET | Refills: 0 | Status: SHIPPED | OUTPATIENT
Start: 2024-12-18

## 2024-12-18 RX ORDER — IOPAMIDOL 755 MG/ML
500 INJECTION, SOLUTION INTRAVASCULAR ONCE
Status: COMPLETED | OUTPATIENT
Start: 2024-12-18 | End: 2024-12-18

## 2024-12-18 RX ORDER — NEOMYCIN SULFATE, POLYMYXIN B SULFATE AND HYDROCORTISONE 10; 3.5; 1 MG/ML; MG/ML; [USP'U]/ML
3 SUSPENSION/ DROPS AURICULAR (OTIC) 4 TIMES DAILY
Qty: 10 ML | Refills: 0 | Status: SHIPPED | OUTPATIENT
Start: 2024-12-18 | End: 2024-12-25

## 2024-12-18 RX ORDER — GADOBUTROL 604.72 MG/ML
6 INJECTION INTRAVENOUS ONCE
Status: COMPLETED | OUTPATIENT
Start: 2024-12-18 | End: 2024-12-18

## 2024-12-18 RX ADMIN — GADOBUTROL 6 ML: 604.72 INJECTION INTRAVENOUS at 23:07

## 2024-12-18 RX ADMIN — IOPAMIDOL 67 ML: 755 INJECTION, SOLUTION INTRAVENOUS at 21:36

## 2024-12-18 ASSESSMENT — COLUMBIA-SUICIDE SEVERITY RATING SCALE - C-SSRS
2. HAVE YOU ACTUALLY HAD ANY THOUGHTS OF KILLING YOURSELF IN THE PAST MONTH?: NO
1. IN THE PAST MONTH, HAVE YOU WISHED YOU WERE DEAD OR WISHED YOU COULD GO TO SLEEP AND NOT WAKE UP?: NO
6. HAVE YOU EVER DONE ANYTHING, STARTED TO DO ANYTHING, OR PREPARED TO DO ANYTHING TO END YOUR LIFE?: NO

## 2024-12-18 ASSESSMENT — ACTIVITIES OF DAILY LIVING (ADL)
ADLS_ACUITY_SCORE: 41

## 2024-12-18 NOTE — PROGRESS NOTES
Assessment & Plan     1. Acute otitis externa of left ear, unspecified type (Primary)  She has swelling and erythema of her canal, as well as pain with manipulation of the pinna  She complains of swelling just below her ear, as well as swelling of her face. This is hard for me to appreciate, but she does have pain with palpation on lilly and pinna. Will cover for cellulitis as well with augmentin.   - neomycin-polymyxin-hydrocortisone (CORTISPORIN) 3.5-33791-8 otic suspension; Place 3 drops Into the left ear 4 times daily for 7 days.  Dispense: 10 mL; Refill: 0  - amoxicillin-clavulanate (AUGMENTIN) 875-125 MG tablet; Take 1 tablet by mouth 2 times daily for 5 days.  Dispense: 10 tablet; Refill: 0      2. Dizziness  Patient had acute onset of dizziness as she was in the parking lot, where her gait completely changed and she was unable to walk. She fell to her left side. She did not hit her head. She complains of having difficulty walking. On re examination, she does have increased dizziness with movement of her head to the left. Her dizziness is not paroxsymal, it is continual. She is neurologically intact. I had her sit with her head still and she continues to have severe dizziness and gait change. I will have her  drive her to the ER.     Jodie Christianson PA-C  [facility] [place]    CHIEF COMPLAINT:   Chief Complaint   Patient presents with    Urgent Care     Left ear pain x 1 week    Dizziness     Walked out of the clinic and felt dizzy and fell in the parking lot, did not hit head and does not claim anything hurts her right now. Still feeling dizzy and off balance, had to help pt walk to the room.      Merlin Amezcua is a 64 year old female who presents to clinic today for evaluation of left ear pain. Symptoms started about one week ago, and has worsened. She has felt swelling of her face and below her ear.  Patient denies fever, chills, drainage from ears, decreased hearing,  tinnitus,  or recent swimming.       Dizziness started as she was walking out the door. She was leaning to the left as she was walking out of the building and fell landing on her left side. She did not hit her head.   No headache. NO vision changes.  Dizziness is more intense than when she had it last night. Finding it difficult to walk independently.   Denies having weakness / numbness / tingling into extremities.         Past Medical History:   Diagnosis Date    Allergic rhinitis, cause unspecified     Disorders of bursae and tendons in shoulder region, unspecified 01/2006    right    Endometriosis of other specified sites     Esophageal reflux     FIBROMYALGIA     Neg Rheum work-up    Hyperlipidemia     Impingement syndrome of left shoulder     s/p cortisone injection    Insomnia 12/23/2011    Lateral epicondylitis  of elbow 02/2006    left    Plantar fascial fibromatosis     Polyneuropathy in other diseases classified elsewhere (H)     Radial styloid tenosynovitis     bilateral    Seborrheic dermatitis     Sprain of hand, unspecified site 01/2006    right thumb MCP joint    Uncomplicated asthma      Past Surgical History:   Procedure Laterality Date    ABDOMEN SURGERY  01/01/1992    hysterectomy    APPENDECTOMY  01/01/1977    COLONOSCOPY  2018    SOFT TISSUE SURGERY      [facility] NONSPECIFIC PROCEDURE  01/01/2004    Plantar fasciotomy    [facility] NONSPECIFIC PROCEDURE  01/01/1992    DAVID, BSO for endometriosis     Social History     Tobacco Use    Smoking status: Never    Smokeless tobacco: Never   Substance Use Topics    Alcohol use: No     Current Outpatient Medications   Medication Sig Dispense Refill    amoxicillin-clavulanate (AUGMENTIN) 875-125 MG tablet Take 1 tablet by mouth 2 times daily for 5 days. 10 tablet 0    ciclopirox (LOPROX) 0.77 % cream Apply topically daily 30 g 6    ciclopirox (LOPROX) 0.77 % cream Apply topically daily 30 g 6    cyclobenzaprine (FLEXERIL) 5 MG tablet TAKE ONE OR TWO TABLETS (5-10 mg)  BY MOUTH AT BEDTIME AS NEEDED for back pain or spasms 180 tablet 1    DULoxetine (CYMBALTA) 30 MG capsule TAKE ONE CAPSULE BY MOUTH TWICE DAILY 180 capsule 0    estradiol (VIVELLE-DOT) 0.05 MG/24HR bi-weekly patch APPLY 1 PATCH topically TO CLEAN, DRY, HAIRLESS SKIN TWICE WEEKLY. REMOVE OLD PATCH BEFORE APPLYING A NEW ONE. 24 patch 3    melatonin 3 MG tablet Take 1 tablet (3 mg) by mouth nightly as needed for sleep 100 tablet 3    neomycin-polymyxin-hydrocortisone (CORTISPORIN) 3.5-83601-3 otic suspension Place 3 drops Into the left ear 4 times daily for 7 days. 10 mL 0    omeprazole (PRILOSEC) 20 MG DR capsule Take 1 capsule (20 mg) by mouth daily 90 capsule 1    pregabalin (LYRICA) 75 MG capsule TAKE 1 CAPSULE (75 mg) BY MOUTH THREE TIMES DAILY 270 capsule 1    order for DME Orthotics for both feet 1 Package 2     No current facility-administered medications for this visit.     No Known Allergies    10 point ROS of systems were all negative except for pertinent positives noted in my HPI.    Exam:   BP (!) 164/100 (BP Location: Right arm, Patient Position: Sitting, Cuff Size: Adult Regular)   Pulse 103   Temp 98.1  F (36.7  C) (Tympanic)   SpO2 96%   Constitutional: healthy, alert and no distress  Head: Normocephalic, atraumatic.No nystagmus  Eyes: conjunctiva clear, no drainage. EOMI. PERRL.   ENT: TMs clear and shiny ganesh,  Right canal : erythema, tenderness with movement of the pinna. She has TTP of her lilly with mild erythema. nasal mucosa pink and moist, throat without tonsillar hypertrophy or erythema  Neck: neck is supple, no cervical lymphadenopathy or nuchal rigidity  Cardiovascular: RRR  Respiratory: CTA bilaterally, no rhonchi or rales  Gastrointestinal: soft and nontender  Skin: no rashes  Neurologic: Speech clear, gait normal. Moves all extremities. Neg pronator. Coordination intact. Strength and sensation intact bilaterally. Dizziness increased with movement of her head to the left.     No results  found for any visits on 12/18/24.

## 2024-12-19 LAB
ATRIAL RATE - MUSE: 84 BPM
DIASTOLIC BLOOD PRESSURE - MUSE: NORMAL MMHG
INTERPRETATION ECG - MUSE: NORMAL
P AXIS - MUSE: 55 DEGREES
PR INTERVAL - MUSE: 162 MS
QRS DURATION - MUSE: 66 MS
QT - MUSE: 356 MS
QTC - MUSE: 420 MS
R AXIS - MUSE: 23 DEGREES
SYSTOLIC BLOOD PRESSURE - MUSE: NORMAL MMHG
T AXIS - MUSE: 41 DEGREES
VENTRICULAR RATE- MUSE: 84 BPM

## 2024-12-19 NOTE — DISCHARGE INSTRUCTIONS
What do you do next:   Continue your home medications unless we have specifically changed them  If medications were prescribed today, take these as directed.  Follow up as indicated below    When do you return: Review your discharge papers for specifics on reasons to return.    Thank you for allowing us to care for you today.

## 2024-12-19 NOTE — ED TRIAGE NOTES
Pt arrives with concern for dizziness for three weeks. Came from , diagnosed with left outer ear infection, fell on her way out of the , no LOC did not hit head. Describes room spinning, had MRI scheduled for dizziness on 12/29, but patient feels it has worsened and can't wait until then. A&OX4.

## 2024-12-19 NOTE — ED PROVIDER NOTES
Emergency Department Note      Code Status: No Order    History of Present Illness     Chief Complaint:  Dizziness       HPI   Goldie Paul is a 64 year old female who presents to the ED with her  and 2 children due to dizziness.  The patient states that 3 weeks ago she began having dizziness.  She describes this as a spinning sensation.     She saw her primary care clinic on 12/9/2024.  MRI of the brain was ordered.  The patient apparently had a headache that day as well.  Temporal arteritis was on their diagnosis and ESR was ordered.  Sleep apnea was also a possibility.  The patient has had an elevated blood pressure without a specific diagnosis of hypertension and has been monitoring her blood pressure.  Her thyroid was also prominent on exam so ultrasound and labs were ordered.    Today, the patient had ear pain and went to urgent care.  She states that her ear canal was swollen and they were concerned for cellulitis so she was given oral antibiotics and antibiotic drops for her ear.  As she was leaving at approximately 530, the patient became acutely dizzy and felt that it was difficult to walk.  She felt that she was not able to keep her balance and nearly fell to the ground though there was no fast heartbeat or loss of consciousness.  She noted some gait difficulty when she arrived here though that is not currently bothering her.  She currently has no dizziness.    Independent Historian:    None    Review of External Notes  12/9/24 PCP note: HA, dizziness, prominent thyroid, HTN. Labs ordered. ESR normal. TSH normal. BMP normal. MRI ordered.    12/18/24  note: earache. Given augmentin and cortisporin otic for OE. Patient became acutely dizzy and fell on the way out of the .     Past Medical History   Medical History, Surgical History, Problem List, and Medications  Reviewed in Epic    Physical Exam   Patient Vitals for the past 24 hrs:   BP Temp Temp src Pulse Resp SpO2 Height Weight   12/18/24  "2348 134/78 98  F (36.7  C) Oral 87 18 98 % -- --   12/18/24 1858 -- -- -- -- -- -- 1.6 m (5' 3\") --   12/18/24 1856 (!) 144/88 97.5  F (36.4  C) Temporal 90 18 99 % -- --   12/18/24 1853 -- -- -- -- -- -- -- 61.1 kg (134 lb 11.2 oz)       Physical Exam  Constitutional: Vital signs reviewed as above.   Eyes: PEERL, EOMI B/L  Neck: No JVD noted. FROM. No bruit noted  Cardiovascular: normal rate, Regular rhythm and normal heart sounds.  No murmur heard. Equal B/L peripheral pulses.  Pulmonary/Chest: Effort normal and breath sounds normal. No respiratory distress. Patient has no wheezes. Patient has no rales.   Gastrointestinal: Soft. There is no tenderness.   Musculoskeletal/Extremities: No pitting edema noted. Normal tone.  Skin: Skin is warm and dry. There is no diaphoresis noted.   Psychiatric: The patient appears calm.   Head: No external signs of trauma. No lesions noted.  ENT:   Ears: Left external canal is slightly erythematous and somewhat swollen.  Visualized portion of the tympanic membrane appears normal.  Normal right tympanic membrane and right external canal.     Nose: Noncongested, no exudates. No rhinorrhea. No FB noted   Mouth/Throat: MMM  Neurological:    No nystagmus noted  Patient is alert and oriented to person, place, and time.    Speech is fluent, cognition is normal.   CN 2-12 intact (PERRL, EOMI, symmetric smile, equal eye squeeze and forehead raise, normal and equal sensation to bilateral forehead/cheek/chin, grossly equal hearing B/L, midline tongue protrusion with nl side-to-side movement, normal shoulder shrug).    RUE strength 5/5: , finger abd, wrist flex/ext, elbow flex/ext.    LUE strength 5/5: , finger abd, wrist flex/ext, elbow flex/ext.    RLE strength 5/5: ankle flex/ext, knee flex/ext, hip flex.    LLE strength 5/5: ankle flex/ext, knee flex/ext, hip flex.    Sensation equal in all 4 extremities.    No arm drift.     Cerebellar: Normal rapid alternating movements     ( " finger-nose-finger, rapid pronation/supination, hand rolling)    Normal heel-to-shin   The patient did not feel that her gait was abnormal though as a tested brief forward and backward walking in the intake room #4, she seemed somewhat unsteady on her feet.      Diagnostics     Laboratory: Imaging:   Labs Ordered and Resulted from Time of ED Arrival to Time of ED Departure   BASIC METABOLIC PANEL - Normal       Result Value    Sodium 137      Potassium 4.2      Chloride 101      Carbon Dioxide (CO2) 25      Anion Gap 11      Urea Nitrogen 8.0      Creatinine 0.59      GFR Estimate >90      Calcium 9.4      Glucose 95     TROPONIN T, HIGH SENSITIVITY - Normal    Troponin T, High Sensitivity <6     CBC WITH PLATELETS AND DIFFERENTIAL    WBC Count 8.1      RBC Count 5.00      Hemoglobin 14.2      Hematocrit 43.3      MCV 87      MCH 28.4      MCHC 32.8      RDW 13.5      Platelet Count 280      % Neutrophils 62      % Lymphocytes 31      % Monocytes 5      % Eosinophils 2      % Basophils 1      % Immature Granulocytes 1      NRBCs per 100 WBC 0      Absolute Neutrophils 5.0      Absolute Lymphocytes 2.5      Absolute Monocytes 0.4      Absolute Eosinophils 0.1      Absolute Basophils 0.1      Absolute Immature Granulocytes 0.1      Absolute NRBCs 0.0       MR Brain w/o & w Contrast   Final Result   IMPRESSION:   1.  Normal head MRI.      CTA Head Neck with Contrast   Final Result   IMPRESSION:    HEAD CT:   1.  No acute intracranial process.      HEAD CTA:    1.  No proximal arterial occlusion.      NECK CTA:   1.  No hemodynamically significant stenosis in the neck vessels.    2.  No evidence for dissection.      CT Head w/o Contrast   Final Result   IMPRESSION:    HEAD CT:   1.  No acute intracranial process.      HEAD CTA:    1.  No proximal arterial occlusion.      NECK CTA:   1.  No hemodynamically significant stenosis in the neck vessels.    2.  No evidence for dissection.            EKG   ECG results from  12/18/24   EKG 12-lead, tracing only     Value    Systolic Blood Pressure     Diastolic Blood Pressure     Ventricular Rate 84    Atrial Rate 84    MI Interval 162    QRS Duration 66        QTc 420    P Axis 55    R AXIS 23    T Axis 41    Interpretation ECG      Sinus rhythm  Normal ECG  No previous ECGs available  Interpreted by me at 1906       Independent Interpretation  See ED course    ED Course    Medications Administered  Medications   sodium chloride (PF) 0.9% PF flush 100 mL (80 mLs Intravenous $Given 12/18/24 2136)   iopamidol (ISOVUE-370) solution 500 mL (67 mLs Intravenous $Given 12/18/24 2136)   gadobutrol (GADAVIST) injection 6 mL (6 mLs Intravenous $Given 12/18/24 2307)       Procedures  Procedures     Discussion of Management  See ED Course    ED Course  ED Course as of 12/19/24 0236   Wed Dec 18, 2024   2059 Initial evaluation   2349 Rechecked and updated.       Optional/Additional Documentation: None    Medical Decision Making / Diagnosis     MIPS     None    Medical Decision Making:  This 64-year-old presents due to dizziness.  Please see the HPI and exam for specifics.  A broad differential was considered leading to the workup above.  Fortunately, no signs of large vessel occlusion, intracranial hemorrhage, stroke, or brain mass were detected on evaluation today.  Laboratory studies are also reassuring.  EKG does not demonstrate arrhythmia.    Ultimately, I felt the discharge was reasonable.  I will put a referral in for vestibular physical therapy.  Primary care follow-up recommended.  If symptoms persist, neurology follow-up can be considered.    Critical Care:  None.    Disposition:  See ED Course and [facility]    ICD-10 Codes:    ICD-10-CM    1. Vertigo  R42            Discharge Medications:  Discharge Medication List as of 12/18/2024 11:56 PM        START taking these medications    Details   meclizine (ANTIVERT) 25 MG tablet Take 1 tablet (25 mg) by mouth 3 times daily as needed for  dizziness., Disp-15 tablet, R-0, E-Prescribe              12/18/2024   Richard Han DO     [employer]                    Richard Han DO  12/19/24 0231

## 2024-12-23 ENCOUNTER — MYC MEDICAL ADVICE (OUTPATIENT)
Dept: INTERNAL MEDICINE | Facility: CLINIC | Age: 64
End: 2024-12-23
Payer: COMMERCIAL

## 2025-01-08 ENCOUNTER — THERAPY VISIT (OUTPATIENT)
Dept: PHYSICAL THERAPY | Facility: CLINIC | Age: 65
End: 2025-01-08
Payer: COMMERCIAL

## 2025-01-08 DIAGNOSIS — H81.12 BENIGN PAROXYSMAL POSITIONAL VERTIGO, LEFT: Primary | ICD-10-CM

## 2025-01-08 DIAGNOSIS — R42 VERTIGO: ICD-10-CM

## 2025-01-08 PROCEDURE — 97112 NEUROMUSCULAR REEDUCATION: CPT | Mod: GP | Performed by: PHYSICAL THERAPIST

## 2025-01-08 PROCEDURE — 95992 CANALITH REPOSITIONING PROC: CPT | Mod: GP | Performed by: PHYSICAL THERAPIST

## 2025-01-11 ENCOUNTER — OFFICE VISIT (OUTPATIENT)
Dept: URGENT CARE | Facility: URGENT CARE | Age: 65
End: 2025-01-11
Payer: COMMERCIAL

## 2025-01-11 VITALS
HEART RATE: 93 BPM | SYSTOLIC BLOOD PRESSURE: 130 MMHG | BODY MASS INDEX: 23.91 KG/M2 | DIASTOLIC BLOOD PRESSURE: 86 MMHG | WEIGHT: 135 LBS | RESPIRATION RATE: 20 BRPM | OXYGEN SATURATION: 96 % | TEMPERATURE: 97.6 F

## 2025-01-11 DIAGNOSIS — R05.1 ACUTE COUGH: ICD-10-CM

## 2025-01-11 DIAGNOSIS — J02.9 SORE THROAT: ICD-10-CM

## 2025-01-11 DIAGNOSIS — J01.90 ACUTE SINUSITIS WITH SYMPTOMS > 10 DAYS: ICD-10-CM

## 2025-01-11 DIAGNOSIS — H10.9 BACTERIAL CONJUNCTIVITIS OF RIGHT EYE: Primary | ICD-10-CM

## 2025-01-11 LAB
DEPRECATED S PYO AG THROAT QL EIA: NEGATIVE
FLUAV AG SPEC QL IA: NEGATIVE
FLUBV AG SPEC QL IA: NEGATIVE

## 2025-01-11 PROCEDURE — 99214 OFFICE O/P EST MOD 30 MIN: CPT | Performed by: FAMILY MEDICINE

## 2025-01-11 PROCEDURE — 87804 INFLUENZA ASSAY W/OPTIC: CPT | Performed by: FAMILY MEDICINE

## 2025-01-11 PROCEDURE — 87651 STREP A DNA AMP PROBE: CPT | Performed by: FAMILY MEDICINE

## 2025-01-11 PROCEDURE — 87635 SARS-COV-2 COVID-19 AMP PRB: CPT | Performed by: FAMILY MEDICINE

## 2025-01-11 RX ORDER — POLYMYXIN B SULFATE AND TRIMETHOPRIM 1; 10000 MG/ML; [USP'U]/ML
1 SOLUTION OPHTHALMIC EVERY 6 HOURS
Qty: 10 ML | Refills: 0 | Status: SHIPPED | OUTPATIENT
Start: 2025-01-11 | End: 2025-01-13

## 2025-01-11 RX ORDER — CODEINE PHOSPHATE AND GUAIFENESIN 10; 100 MG/5ML; MG/5ML
1 SOLUTION ORAL EVERY 6 HOURS PRN
Qty: 180 ML | Refills: 0 | Status: SHIPPED | OUTPATIENT
Start: 2025-01-11 | End: 2025-01-13

## 2025-01-11 ASSESSMENT — PAIN SCALES - GENERAL: PAINLEVEL_OUTOF10: SEVERE PAIN (6)

## 2025-01-12 LAB — S PYO DNA THROAT QL NAA+PROBE: NOT DETECTED

## 2025-01-12 NOTE — PATIENT INSTRUCTIONS
Continue inhaling steam through the nose and mouth.      Drink plenty of liquids.      Start Flonase nasal spray (place two sprays in each nostril once a day until the nasal and sinus symptoms improve).      Go to the emergency room if you develop worsening severe shortness of breath.      Follow up if not better in one week.

## 2025-01-12 NOTE — TELEPHONE ENCOUNTER
Dr. Negrete,     Pt's message mentions Dizziness and PT or Dizzy/Balance.  Please review if referral is appropriate for pt based on visit from 2 days ago.    Sandie Hardin, RN  [facility] RN Triage Team    
See ER visit 12/18/24 and [facility] PT appt 12/20/24 in chart  
Capillary refill less/equal to 2 seconds/Strong peripheral pulses

## 2025-01-12 NOTE — PROGRESS NOTES
SUBJECTIVE:   Goldie Paul is a 64 year old female presenting with the following concerns:    Concern #1:    Right eye has a little amount of yellow goopy discharge.  There was redness in the right eye today and yesterday.  Vision is normal from the right eye.  .      Concern #2:  sinus pressure (behind the eyes and bilateral headache, cheeks) for the past 5 days.  There has been a stuffy nose for the past three weeks and green thick sinus discharge.      Concern #3:  cough (occasional cough, worse at night when supine and triggered by postnasal drainage) for the past three weeks,  chest congestion, wheezing (mild) for the past several days, She has tried Mucinex and Nyquil without much relief.      Concern #4:  Sore throat for the past several weeks .  The sore throat feels dry. She has been using warm saltwater and has been drinking turmeric mixed with jacque and honey and cayenne pepper and cinnamon.       Note:  Patient is leaving on a [employer] cruise soon, and she has to get better as soon as possible.        Past Medical History:   Diagnosis Date    Allergic rhinitis, cause unspecified     Disorders of bursae and tendons in shoulder region, unspecified 01/2006    right    Endometriosis of other specified sites     Esophageal reflux     FIBROMYALGIA     Neg Rheum work-up    Hyperlipidemia     Impingement syndrome of left shoulder     s/p cortisone injection    Insomnia 12/23/2011    Lateral epicondylitis  of elbow 02/2006    left    Plantar fascial fibromatosis     Polyneuropathy in other diseases classified elsewhere     Radial styloid tenosynovitis     bilateral    Seborrheic dermatitis     Sprain of hand, unspecified site 01/2006    right thumb MCP joint    Uncomplicated asthma      Current Outpatient Medications   Medication Sig Dispense Refill    ciclopirox (LOPROX) 0.77 % cream Apply topically daily 30 g 6    ciclopirox (LOPROX) 0.77 % cream Apply topically daily 30 g 6    cyclobenzaprine (FLEXERIL)  5 MG tablet TAKE ONE OR TWO TABLETS (5-10 mg) BY MOUTH AT BEDTIME AS NEEDED for back pain or spasms 180 tablet 1    DULoxetine (CYMBALTA) 30 MG capsule TAKE ONE CAPSULE BY MOUTH TWICE DAILY 180 capsule 0    estradiol (VIVELLE-DOT) 0.05 MG/24HR bi-weekly patch APPLY 1 PATCH topically TO CLEAN, DRY, HAIRLESS SKIN TWICE WEEKLY. REMOVE OLD PATCH BEFORE APPLYING A NEW ONE. 24 patch 3    meclizine (ANTIVERT) 25 MG tablet Take 1 tablet (25 mg) by mouth 3 times daily as needed for dizziness. 15 tablet 0    melatonin 3 MG tablet Take 1 tablet (3 mg) by mouth nightly as needed for sleep 100 tablet 3    omeprazole (PRILOSEC) 20 MG DR capsule Take 1 capsule (20 mg) by mouth daily 90 capsule 1    order for DME Orthotics for both feet 1 Package 2    pregabalin (LYRICA) 75 MG capsule TAKE 1 CAPSULE (75 mg) BY MOUTH THREE TIMES DAILY 270 capsule 1     Social History     Tobacco Use    Smoking status: Never    Smokeless tobacco: Never   Substance Use Topics    Alcohol use: No       ROS:  CONSTITUTIONAL:negative for fevers.    EYES:  positive for discharge and redness at the right eye.   ENT/MOUTH: positive for sinus pressure and nasal discharge.    RESP: positive for cough.      OBJECTIVE:  /86 (BP Location: Right arm, Patient Position: Sitting, Cuff Size: Adult Regular)   Pulse 93   Temp 97.6  F (36.4  C) (Tympanic)   Resp 20   Wt 61.2 kg (135 lb)   SpO2 96%   BMI 23.91 kg/m    GENERAL APPEARANCE: healthy, alert and no distress.  No respiratory distress.    RIGHT EYE:  there is conjunctival erythema at the right eye.  No discharge was seen.    HENT: TM's normal bilaterally, nasal turbinates erythematous, swollen, and oropharynx is mildly erythematous without exudates.    NECK: supple, nontender, no lymphadenopathy  RESP: lungs clear to auscultation - no rales, rhonchi or wheezes  CV: regular rates and rhythm, normal S1 S2, no murmur noted  SKIN: no suspicious lesions or rashes    LAB:    Results for orders placed or  performed in visit on 01/11/25   Streptococcus A Rapid Screen w/Reflex to PCR - Clinic Collect     Status: Normal    Specimen: Throat; Swab   Result Value Ref Range    Group A Strep antigen Negative Negative   Influenza A/B antigen     Status: Normal    Specimen: Nasopharyngeal; Swab   Result Value Ref Range    Influenza A antigen Negative Negative    Influenza B antigen Negative Negative    Narrative    Test results must be correlated with clinical data. If necessary, results should be confirmed by a molecular assay or viral culture.         ASSESSMENT:  Bacterial conjunctivitis of the right eye    Acute Cough.  Influenza test is negative.      Sore throat  Rapid strep test and influenza test are negative.      Acute Sinusitis.     PLAN:  For the sinusitis:  Rx:  Augmentin  Over the counter Flonase  Follow up if not better in one week.    Drink plenty of liquids.     For the cough:  Pending labs:  COVID-19 PCR Test  Rx:  Robitussin AC  (Tessalon Perles have not worked in the past for previous coughs.).    Go to the emergency room if experiencing worsening shortness of breath.      For the sore throat:  Pending lab:    Strep PCR Test.     Osiel Mendez MD

## 2025-01-13 ENCOUNTER — OFFICE VISIT (OUTPATIENT)
Dept: FAMILY MEDICINE | Facility: CLINIC | Age: 65
End: 2025-01-13
Payer: COMMERCIAL

## 2025-01-13 VITALS
SYSTOLIC BLOOD PRESSURE: 138 MMHG | BODY MASS INDEX: 23.57 KG/M2 | HEIGHT: 63 IN | OXYGEN SATURATION: 97 % | TEMPERATURE: 98.4 F | WEIGHT: 133 LBS | HEART RATE: 100 BPM | DIASTOLIC BLOOD PRESSURE: 86 MMHG | RESPIRATION RATE: 16 BRPM

## 2025-01-13 DIAGNOSIS — J01.00 ACUTE NON-RECURRENT MAXILLARY SINUSITIS: Primary | ICD-10-CM

## 2025-01-13 DIAGNOSIS — J02.9 SORE THROAT: ICD-10-CM

## 2025-01-13 LAB — SARS-COV-2 RNA RESP QL NAA+PROBE: NEGATIVE

## 2025-01-13 PROCEDURE — 99213 OFFICE O/P EST LOW 20 MIN: CPT | Performed by: PHYSICIAN ASSISTANT

## 2025-01-13 PROCEDURE — G2211 COMPLEX E/M VISIT ADD ON: HCPCS | Performed by: PHYSICIAN ASSISTANT

## 2025-01-13 RX ORDER — PREDNISONE 20 MG/1
20 TABLET ORAL DAILY
Qty: 5 TABLET | Refills: 0 | Status: SHIPPED | OUTPATIENT
Start: 2025-01-13 | End: 2025-01-18

## 2025-01-13 RX ORDER — CEFDINIR 300 MG/1
300 CAPSULE ORAL 2 TIMES DAILY
Qty: 20 CAPSULE | Refills: 0 | Status: SHIPPED | OUTPATIENT
Start: 2025-01-13 | End: 2025-01-23

## 2025-01-13 ASSESSMENT — PAIN SCALES - GENERAL: PAINLEVEL_OUTOF10: NO PAIN (0)

## 2025-01-13 NOTE — PROGRESS NOTES
Assessment & Plan     Acute non-recurrent maxillary sinusitis  Will change Rx to cephalosporin, strep was negative but throat is feeling quite sore and inflamed currently.  - cefdinir (OMNICEF) 300 MG capsule; Take 1 capsule (300 mg) by mouth 2 times daily for 10 days.  - predniSONE (DELTASONE) 20 MG tablet; Take 1 tablet (20 mg) by mouth daily for 5 days.    Sore throat  Patient will follow up if this isn't improving.    - cefdinir (OMNICEF) 300 MG capsule; Take 1 capsule (300 mg) by mouth 2 times daily for 10 days.  - predniSONE (DELTASONE) 20 MG tablet; Take 1 tablet (20 mg) by mouth daily for 5 days.        MED REC REQUIRED  Post Medication Reconciliation Status: discharge medications reconciled and changed, per note/orders    The longitudinal plan of care for the diagnosis(es)/condition(s) as documented were addressed during this visit. Due to the added complexity in care, I will continue to support Goldie in the subsequent management and with ongoing continuity of care.    Subjective   Goldie is a 64 year old, presenting for the following health issues:  Urgent Care (Follow up ) and Medication Problem        1/13/2025     8:50 AM   Additional Questions   Roomed by Daniela         1/13/2025     8:50 AM   Patient Reported Additional Medications   Patient reports taking the following new medications none     HPI       ED/UC Followup:    Facility:  [facility]  Date of visit: 1/11/25  Reason for visit: cough, sinus  Current Status: possible reaction to medication. Woke up this morning with lips very swollen,tingling, tongue thick and coated and cracking. Patient states that she does use a CPAP.     Started augmentin two days about from  for sinusitis and cough.  Using mouth tape until her CPAP comes  No throat swelling, took benadryl this AM which helped the tongue symptoms.  Has had augment in the past  Going on a cruise at the end of the week.        Review of Systems  Constitutional, HEENT, cardiovascular,  "pulmonary, gi and gu systems are negative, except as otherwise noted.      Objective    /86 (BP Location: Right arm, Patient Position: Sitting, Cuff Size: Adult Regular)   Pulse 100   Temp 98.4  F (36.9  C) (Oral)   Resp 16   Ht 1.6 m (5' 3\")   Wt 60.3 kg (133 lb)   SpO2 97%   BMI 23.56 kg/m    Body mass index is 23.56 kg/m .  Physical Exam   GENERAL: alert and no distress  EYES: Eyes grossly normal to inspection, PERRL and conjunctivae and sclerae normal  HENT: normal cephalic/atraumatic, ear canals and TM's normal, and posterior pharynx and tonsils are inflamed, no exudate, tongue appear normal  NECK: no adenopathy, no asymmetry, masses, or scars, TTP left side  RESP: lungs clear to auscultation - no rales, rhonchi or wheezes  MS: no gross musculoskeletal defects noted, no edema  SKIN: no suspicious lesions or rashes  PSYCH: mentation appears normal, affect normal/bright            Signed Electronically by: Andrea Penaloza PA-C    "

## 2025-01-15 ENCOUNTER — E-VISIT (OUTPATIENT)
Dept: FAMILY MEDICINE | Facility: CLINIC | Age: 65
End: 2025-01-15
Payer: COMMERCIAL

## 2025-01-15 ENCOUNTER — THERAPY VISIT (OUTPATIENT)
Dept: PHYSICAL THERAPY | Facility: CLINIC | Age: 65
End: 2025-01-15
Attending: EMERGENCY MEDICINE
Payer: COMMERCIAL

## 2025-01-15 ENCOUNTER — MYC MEDICAL ADVICE (OUTPATIENT)
Dept: FAMILY MEDICINE | Facility: CLINIC | Age: 65
End: 2025-01-15

## 2025-01-15 DIAGNOSIS — B37.31 CANDIDAL VULVOVAGINITIS: Primary | ICD-10-CM

## 2025-01-15 DIAGNOSIS — H81.12 BENIGN PAROXYSMAL POSITIONAL VERTIGO, LEFT: Primary | ICD-10-CM

## 2025-01-15 DIAGNOSIS — R42 VERTIGO: ICD-10-CM

## 2025-01-15 RX ORDER — FLUCONAZOLE 150 MG/1
150 TABLET ORAL ONCE
Qty: 1 TABLET | Refills: 0 | Status: SHIPPED | OUTPATIENT
Start: 2025-01-15 | End: 2025-01-15

## 2025-01-15 NOTE — PATIENT INSTRUCTIONS
Thank you for choosing us for your care. I have placed an order for a prescription so that you can start treatment. View your full visit summary for details by clicking on the link below. Your pharmacist will able to address any questions you may have about the medication.     If you re not feeling better within 2-3 days, please schedule an appointment.  You can schedule an appointment right here in [facility], or call [phone]  If the visit is for the same symptoms as your eVisit, we ll refund the cost of your eVisit if seen within seven days.    Vaginal Yeast Infection: Care Instructions  Overview     A vaginal yeast infection is the growth of too many yeast cells in the vagina. This is a common problem. Itching, vaginal discharge and irritation, and other symptoms can bother you. But yeast infections don't often cause other health problems.  Some medicines can increase your risk of getting a yeast infection. These include antibiotics, hormones, and steroids. You may also be more likely to get a yeast infection if you are pregnant, have diabetes, douche, or wear tight clothes.  With treatment, most yeast infections get better in a few days.  Follow-up care is a key part of your treatment and safety. Be sure to make and go to all appointments, and call your doctor if you are having problems. It's also a good idea to know your test results and keep a list of the medicines you take.  How can you care for yourself at home?  Take your medicines exactly as prescribed. Call your doctor if you think you are having a problem with your medicine.  Ask your doctor about over-the-counter (OTC) medicines for yeast infections. If you use an OTC treatment, read and follow all instructions on the label.  Don't use tampons while using a vaginal cream or suppository. The tampons can absorb the medicine. Use pads instead.  Wear loose cotton clothing. Don't wear nylon or other fabric that holds body heat and moisture close to the  "skin.  Try sleeping without underwear.  Don't scratch. Relieve itching with a cold pack or a cool bath.  Don't wash your vulva more than once a day. Use plain water or a mild, unscented soap. Air-dry the vulva.  Change out of wet or damp clothes as soon as possible.  If you are using a vaginal medicine, don't have sex until you have finished your treatment. But if you do have sex, don't depend on a condom or diaphragm for birth control. The oil in some vaginal medicines weakens latex.  Don't douche or use powders, sprays, or perfumes in your vagina or on your vulva. These items can change the normal balance of organisms in your vagina.  When should you call for help?   Call your doctor now or seek immediate medical care if:    You have new or increased pain in your vagina or pelvis.   Watch closely for changes in your health, and be sure to contact your doctor if:    You have unexpected vaginal bleeding.     You have a fever.     You are not getting better after 2 days.     Your symptoms come back after you finish your medicines.   Where can you learn more?  Go to [email or web address]://[email or web address].net/patiented  Enter F639 in the search box to learn more about \"Vaginal Yeast Infection: Care Instructions.\"  Current as of: April 30, 2024  Content Version: 14.3    2024 [employer].   Care instructions adapted under license by your healthcare professional. If you have questions about a medical condition or this instruction, always ask your healthcare professional. [employer] disclaims any warranty or liability for your use of this information.    "

## 2025-01-29 ENCOUNTER — OFFICE VISIT (OUTPATIENT)
Dept: FAMILY MEDICINE | Facility: CLINIC | Age: 65
End: 2025-01-29
Payer: COMMERCIAL

## 2025-01-29 VITALS
TEMPERATURE: 97.7 F | WEIGHT: 137.2 LBS | HEART RATE: 92 BPM | HEIGHT: 63 IN | RESPIRATION RATE: 16 BRPM | SYSTOLIC BLOOD PRESSURE: 134 MMHG | DIASTOLIC BLOOD PRESSURE: 83 MMHG | BODY MASS INDEX: 24.31 KG/M2 | OXYGEN SATURATION: 98 %

## 2025-01-29 DIAGNOSIS — K21.9 GASTROESOPHAGEAL REFLUX DISEASE WITHOUT ESOPHAGITIS: ICD-10-CM

## 2025-01-29 DIAGNOSIS — N30.01 ACUTE CYSTITIS WITH HEMATURIA: Primary | ICD-10-CM

## 2025-01-29 DIAGNOSIS — R05.8 POST-VIRAL COUGH SYNDROME: ICD-10-CM

## 2025-01-29 LAB
ALBUMIN UR-MCNC: NEGATIVE MG/DL
APPEARANCE UR: ABNORMAL
BACTERIA #/AREA URNS HPF: ABNORMAL /HPF
BILIRUB UR QL STRIP: NEGATIVE
COLOR UR AUTO: YELLOW
GLUCOSE UR STRIP-MCNC: NEGATIVE MG/DL
HGB UR QL STRIP: ABNORMAL
KETONES UR STRIP-MCNC: NEGATIVE MG/DL
LEUKOCYTE ESTERASE UR QL STRIP: ABNORMAL
NITRATE UR QL: NEGATIVE
PH UR STRIP: 5.5 [PH] (ref 5–7)
RBC #/AREA URNS AUTO: ABNORMAL /HPF
SP GR UR STRIP: 1.01 (ref 1–1.03)
UROBILINOGEN UR STRIP-ACNC: 0.2 E.U./DL
WBC #/AREA URNS AUTO: >100 /HPF

## 2025-01-29 PROCEDURE — 81001 URINALYSIS AUTO W/SCOPE: CPT | Performed by: STUDENT IN AN ORGANIZED HEALTH CARE EDUCATION/TRAINING PROGRAM

## 2025-01-29 PROCEDURE — 99214 OFFICE O/P EST MOD 30 MIN: CPT | Performed by: STUDENT IN AN ORGANIZED HEALTH CARE EDUCATION/TRAINING PROGRAM

## 2025-01-29 RX ORDER — SULFAMETHOXAZOLE AND TRIMETHOPRIM 800; 160 MG/1; MG/1
1 TABLET ORAL 2 TIMES DAILY
Qty: 6 TABLET | Refills: 0 | Status: SHIPPED | OUTPATIENT
Start: 2025-01-29 | End: 2025-01-29

## 2025-01-29 RX ORDER — SULFAMETHOXAZOLE AND TRIMETHOPRIM 800; 160 MG/1; MG/1
1 TABLET ORAL 2 TIMES DAILY
Qty: 6 TABLET | Refills: 0 | Status: SHIPPED | OUTPATIENT
Start: 2025-01-29 | End: 2025-02-01

## 2025-01-29 ASSESSMENT — PAIN SCALES - GENERAL: PAINLEVEL_OUTOF10: MODERATE PAIN (6)

## 2025-01-29 NOTE — PROGRESS NOTES
Assessment & Plan     Acute cystitis with hematuria  No symptoms/signs of ascending UTI. Plan to treat with Bactrim. Discussed red flag symptoms/signs that should prompt her to be seen immediately including new fever, N/V, CVA tenderness. She expresses understanding and is in agreement.   - UA Microscopic with Reflex to Culture  - Urine Culture  - sulfamethoxazole-trimethoprim (BACTRIM DS) 800-160 MG tablet  Dispense: 6 tablet; Refill: 0    Post-viral cough syndrome  Recent back-to-back likely viral URI with complications including otitis externa, acute sinusitis in December and January, treated with Augmentin then cefdinir and prednisone. Now with lingering cough which is slowly improving. Likely post-viral. Discussed red flag symptoms/signs that should prompt her to be seen including new fevers, productive cough, SOB, worsening symptoms. She expresses understanding and is in agreement.     Follow up in 3 months for annual physical, earlier as needed    Migel Soto MD  [facility]  1/30/2025    Merlin Amezcua is a 64 year old, presenting for the following health issues:  Urinary Problem        1/29/2025     4:36 PM   Additional Questions   Roomed by Lisa Magill, CMA   Accompanied by self         1/29/2025     4:36 PM   Patient Reported Additional Medications   Patient reports taking the following new medications none     History of Present Illness       Reason for visit:  Burning and frequency with urination  Symptom onset:  1-3 days ago  Symptoms include:  Burning , frequency  Symptom intensity:  Moderate  Symptom progression:  Staying the same  Had these symptoms before:  No  What makes it worse:  No  What makes it better:  Advil   She is taking medications regularly.     Genitourinary - Female  Onset/Duration: 2 days ago  Description:   Painful urination (Dysuria): YES- burning           Frequency: YES  Blood in urine (Hematuria): No  Delay in urine (Hesitency): YES  Intensity: severe,  7-8/10  Progression of Symptoms:  same  Accompanying Signs & Symptoms:  Fever/chills: No  Flank pain: YES  Nausea and vomiting: No  Vaginal symptoms: none  Abdominal/Pelvic Pain: NO, but body aches and back pain  History:   History of frequent UTI s: No  History of kidney stones: No  Sexually Active: YES  Possibility of pregnancy: No  Precipitating or alleviating factors: nothing/but had a yeast infection due to antibiotics.       Dysuria  Symptoms started about 2-3 days ago.  Burning sensation with urination only.  Seems to be at the end of the stream.   Is more cloudy.   Has been a long time since last time she had a UTI  Had some back pain yesterday, really achy body pain.   Feeling better today. Only took Advil once.   Does have fibromyalgia so not sure if the back pain is related to her underlying fibromyalgia.   No nausea or vomiting. No fevers/chills.     Recent illnesses  On December 18th developed sore throat, loss/hoarse voice, along with fever, chills.  COVID, strept, flu negative at that time.   Exposed to RSV in the family.  Dx with otitis externa, given Augmentin and otic drops.   (Got dizzy at that time, much better since then)     Respiratory symptoms never seemed to fully resolve.    Then symptoms worsened around 1/13/25  Lost voice, fever again, sore throat, coughing  Had a lot more facial pressure associated with headache   Was seen by provider on this day, dx with sinusitis and given cefdinir and prednisone.  The facial pressure has resolved.    Some symptoms are still lingering.  Still coughing but not severe, not hacking.  Now still having a little bit of a moist, productive cough in AMs but dries out during the day.  No longer having the super crackling, moist sound.     Overall, symptoms are definitely better but the residual respiratory symptoms are lingering and taking some time to improve.  Some runny nose and moist cough but just not 100% better.   Overall symptoms ARE slowly improving  "though.        Objective    /83 (BP Location: Right arm, Patient Position: Sitting, Cuff Size: Adult Regular)   Pulse 92   Temp 97.7  F (36.5  C) (Oral)   Resp 16   Ht 1.6 m (5' 3\")   Wt 62.2 kg (137 lb 3.2 oz)   SpO2 98%   BMI 24.30 kg/m    Body mass index is 24.3 kg/m .    Physical Exam   GENERAL: healthy, alert and no distress  HEAD: Normocephalic, atraumatic.   EYES: PERRL. Normal conjunctivae, sclera.   ENT: Normal EAC and TMs bilaterally. Normal oropharynx.   NECK: Supple. No lymphadenopathy appreciated.   RESP: Normal respiratory effort. Lungs clear to auscultation - no rales, rhonchi or wheezes  CV: regular rate and rhythm, normal S1 S2, no murmur, click, rub or gallop.  No peripheral swelling noted.   ABDOMEN: soft, mild tenderness in lower quadrants, no rebound tenderness or guarding present. No hepatomegaly or masses appreciated. BS normactive.  BACK: No CVA tenderness bilaterally.  MSK: no gross musculoskeletal defects noted.  SKIN: no suspicious lesions or rashes.  EXT: Warm and well perfused. DP pulses 2+ bilaterally.  NEURO: CNII-XII grossly intact. No focal deficits.  PSYCH: Groomed, dressed appropriately for weather.  Logical, linear thought process. Normal mood with consistent affect.     Signed Electronically by: Migel Soto MD    "

## 2025-01-29 NOTE — NURSING NOTE
"Chief Complaint   Patient presents with    Urinary Problem     Initial /83 (BP Location: Right arm, Patient Position: Sitting, Cuff Size: Adult Regular)   Pulse 92   Temp 97.7  F (36.5  C) (Oral)   Resp 16   Ht 1.6 m (5' 3\")   Wt 62.2 kg (137 lb 3.2 oz)   SpO2 98%   BMI 24.30 kg/m   Estimated body mass index is 24.3 kg/m  as calculated from the following:    Height as of this encounter: 1.6 m (5' 3\").    Weight as of this encounter: 62.2 kg (137 lb 3.2 oz).  BP completed using cuff size regular right arm    Lisa Magill, CMA    "

## 2025-02-12 ENCOUNTER — THERAPY VISIT (OUTPATIENT)
Dept: PHYSICAL THERAPY | Facility: CLINIC | Age: 65
End: 2025-02-12
Payer: COMMERCIAL

## 2025-02-12 DIAGNOSIS — H81.12 BENIGN PAROXYSMAL POSITIONAL VERTIGO, LEFT: Primary | ICD-10-CM

## 2025-02-12 DIAGNOSIS — R42 VERTIGO: ICD-10-CM

## 2025-02-12 PROCEDURE — 95992 CANALITH REPOSITIONING PROC: CPT | Mod: GP | Performed by: PHYSICAL THERAPIST

## 2025-02-12 PROCEDURE — 97112 NEUROMUSCULAR REEDUCATION: CPT | Mod: GP | Performed by: PHYSICAL THERAPIST

## 2025-02-24 ENCOUNTER — THERAPY VISIT (OUTPATIENT)
Dept: PHYSICAL THERAPY | Facility: CLINIC | Age: 65
End: 2025-02-24
Payer: COMMERCIAL

## 2025-02-24 DIAGNOSIS — R42 VERTIGO: ICD-10-CM

## 2025-02-24 DIAGNOSIS — H81.12 BENIGN PAROXYSMAL POSITIONAL VERTIGO, LEFT: Primary | ICD-10-CM

## 2025-02-24 PROCEDURE — 95992 CANALITH REPOSITIONING PROC: CPT | Mod: GP | Performed by: PHYSICAL THERAPIST

## 2025-02-24 PROCEDURE — 97112 NEUROMUSCULAR REEDUCATION: CPT | Mod: GP | Performed by: PHYSICAL THERAPIST

## 2025-03-14 ENCOUNTER — MYC REFILL (OUTPATIENT)
Dept: INTERNAL MEDICINE | Facility: CLINIC | Age: 65
End: 2025-03-14

## 2025-03-14 DIAGNOSIS — M79.7 FIBROMYALGIA: ICD-10-CM

## 2025-03-14 DIAGNOSIS — N95.1 SYMPTOMATIC MENOPAUSAL OR FEMALE CLIMACTERIC STATES: ICD-10-CM

## 2025-03-14 DIAGNOSIS — K21.9 GASTROESOPHAGEAL REFLUX DISEASE WITHOUT ESOPHAGITIS: ICD-10-CM

## 2025-03-14 RX ORDER — DULOXETIN HYDROCHLORIDE 30 MG/1
30 CAPSULE, DELAYED RELEASE ORAL 2 TIMES DAILY
Qty: 180 CAPSULE | Refills: 0 | OUTPATIENT
Start: 2025-03-14

## 2025-03-14 RX ORDER — OMEPRAZOLE 20 MG/1
20 CAPSULE, DELAYED RELEASE ORAL DAILY
Qty: 90 CAPSULE | Refills: 2 | OUTPATIENT
Start: 2025-03-14

## 2025-03-14 RX ORDER — ESTRADIOL 0.05 MG/D
PATCH, EXTENDED RELEASE TRANSDERMAL
Qty: 24 PATCH | Refills: 3 | OUTPATIENT
Start: 2025-03-14

## 2025-03-14 NOTE — TELEPHONE ENCOUNTER
Brief chart review.    I am listed as PCP but I have seen patient once for acute visit. Please route to Dr. Negrete who appears to be her true PCP.    Thanks,    Migel Soto MD  [facility]  3/14/2025

## 2025-04-28 ENCOUNTER — MYC REFILL (OUTPATIENT)
Dept: INTERNAL MEDICINE | Facility: CLINIC | Age: 65
End: 2025-04-28

## 2025-04-28 ENCOUNTER — THERAPY VISIT (OUTPATIENT)
Dept: PHYSICAL THERAPY | Facility: CLINIC | Age: 65
End: 2025-04-28
Attending: STUDENT IN AN ORGANIZED HEALTH CARE EDUCATION/TRAINING PROGRAM
Payer: COMMERCIAL

## 2025-04-28 DIAGNOSIS — H81.10 BENIGN PAROXYSMAL POSITIONAL VERTIGO, UNSPECIFIED LATERALITY: ICD-10-CM

## 2025-04-28 DIAGNOSIS — M79.7 FIBROMYALGIA: ICD-10-CM

## 2025-04-28 DIAGNOSIS — R42 DIZZINESS: Primary | ICD-10-CM

## 2025-04-28 PROCEDURE — 97161 PT EVAL LOW COMPLEX 20 MIN: CPT | Mod: GP | Performed by: PHYSICAL THERAPIST

## 2025-04-28 PROCEDURE — 95992 CANALITH REPOSITIONING PROC: CPT | Mod: GP | Performed by: PHYSICAL THERAPIST

## 2025-04-28 NOTE — PROGRESS NOTES
"PHYSICAL THERAPY EVALUATION  Type of Visit: Evaluation       Fall Risk Screen:       Subjective   Patient is a 65 y/o female who presents to PT with complaints of vertigo. She reports that her symptoms are 95% better since going to PT last month but still notices slight residual dizziness described as a \"spinning sensation\". Notices most dizziness when rolling in bed to the left or sitting up in bed first thing in the morning. Symptoms are very brief and do not last throughout the day.         Presenting condition or subjective complaint:    Date of onset: 03/28/25    Relevant medical history:   Past Medical History:   Diagnosis Date    Allergic rhinitis, cause unspecified     Disorders of bursae and tendons in shoulder region, unspecified 01/2006    right    Endometriosis of other specified sites     Esophageal reflux     FIBROMYALGIA     Neg Rheum work-up    Hyperlipidemia     Impingement syndrome of left shoulder     s/p cortisone injection    Insomnia 12/23/2011    Lateral epicondylitis  of elbow 02/2006    left    Plantar fascial fibromatosis     Polyneuropathy in other diseases classified elsewhere     Radial styloid tenosynovitis     bilateral    Seborrheic dermatitis     Sprain of hand, unspecified site 01/2006    right thumb MCP joint    Uncomplicated asthma          Dates & types of surgery:   Past Surgical History:   Procedure Laterality Date    ABDOMEN SURGERY  01/01/1992    hysterectomy    APPENDECTOMY  01/01/1977    COLONOSCOPY  2018    SOFT TISSUE SURGERY      [facility] NONSPECIFIC PROCEDURE  01/01/2004    Plantar fasciotomy    [facility] NONSPECIFIC PROCEDURE  01/01/1992    DAVID, BSO for endometriosis          Prior diagnostic imaging/testing results:       Prior therapy history for the same diagnosis, illness or injury:        Prior Level of Function  Transfers: Independent  Ambulation: Independent  ADL: Independent    Living Environment  Social support: (Patient-Rptd) With a significant other or spouse   Type of " home: (Patient-Rptd) House   Stairs to enter the home:         Ramp: (Patient-Rptd) No   Stairs inside the home: (Patient-Rptd) Yes (Patient-Rptd) 10 Is there a railing: (Patient-Rptd) Yes     Help at home: (Patient-Rptd) None  Equipment owned:        Employment: (Patient-Rptd) Yes      Patient goals for therapy:      Pain assessment:  Neck and back pain reported     Objective   Cognitive Status Examination  Orientation: Oriented to person, place and time   Level of Consciousness: Alert      POSTURE: WNL      GAIT:   Gait Description: WNL    BALANCE:     SPECIAL TESTS    Dynamic Gait Index (DGI) 4-item DGI: 12/12       COORDINATION: Normal    VESTIBULAR  Cervicogenic Screen    Neck ROM Abnormal; limited cervical flexion and bilateral side bend       Infrared Goggle Exam Vestibular Suppressant in Last 24 Hours? No  Exam Completed With: Infrared goggles   Spontaneous Nystagmus Negative    Left Right   Dell-Hallpike Upbeating L torsional Negative   HSCC Supine Roll Test Negative Negative   BPPV Canal(s): L Posterior  BPPV Type: Canalithasis        Assessment & Plan   CLINICAL IMPRESSIONS  Medical Diagnosis: BPPV    Treatment Diagnosis: BPPV, dizziness   Impression/Assessment: Patient is a 64 year old female with vertigo complaints.  The following significant findings have been identified: Instability and Dizziness. Upon assessment, patient signs and symptoms consistent with L Posterior Canalithiasis. 4-item DGI indicates that patient is not at an increased risk for falls. These impairments interfere with their ability to perform self care tasks, work tasks, recreational activities, household chores, household mobility, and community mobility as compared to previous level of function.  Continued skilled PT services appropriate to help resolve dizziness for return to functional prior level of function.     Clinical Decision Making (Complexity):  Clinical Presentation: Stable/Uncomplicated  Clinical Presentation Rationale:  based on medical and personal factors listed in PT evaluation  Clinical Decision Making (Complexity): Low complexity    PLAN OF CARE  Treatment Interventions:  Interventions: Gait Training, Manual Therapy, Neuromuscular Re-education, Therapeutic Activity, Therapeutic Exercise, Self-Care/Home Management, Canalith Repositioning    Long Term Goals     PT Goal 1  Goal Identifier: BPPV  Goal Description: Patient will test negative with BPPV testing  Rationale: to maximize safety and independence with performance of ADLs and functional tasks  Target Date: 06/27/25      Frequency of Treatment: 1x/week  Duration of Treatment: 6 weeks      Education Assessment:   Learner/Method: Patient;Demonstration;Pictures/Video;No Barriers to Learning;Listening  Education Comments: BPPV findings, post-CRM precautions    Risks and benefits of evaluation/treatment have been explained.   Patient/Family/caregiver agrees with Plan of Care.     Evaluation Time:     PT Eval, Low Complexity Minutes (98866): 14       Signing Clinician: Ana Rosa Conrad PT

## 2025-04-29 NOTE — TELEPHONE ENCOUNTER
I saw this patient 1x for an acute issue. Please have her obtain this from prior prescriber.     Thanks,    Migel Soto MD  [facility]  4/29/2025

## 2025-04-30 ENCOUNTER — MYC MEDICAL ADVICE (OUTPATIENT)
Dept: INTERNAL MEDICINE | Facility: CLINIC | Age: 65
End: 2025-04-30
Payer: COMMERCIAL

## 2025-04-30 RX ORDER — PREGABALIN 75 MG/1
CAPSULE ORAL
Qty: 90 CAPSULE | Refills: 0 | Status: SHIPPED | OUTPATIENT
Start: 2025-04-30

## 2025-04-30 RX ORDER — PREGABALIN 75 MG/1
CAPSULE ORAL
Qty: 270 CAPSULE | Refills: 1 | OUTPATIENT
Start: 2025-04-30

## 2025-04-30 NOTE — TELEPHONE ENCOUNTER
Patient out of med.  Routing high priority.  Asya García, ODETTE Paul to [facility] (supporting Misbah Negrete MD)   IV      4/30/25  8:46 AM  Good morning  ,  Can you please renew my Lyrica medication as soon as you can please .   I am now completely out of the medication.                 Thank you                    Goldie Paul

## 2025-05-01 ENCOUNTER — MYC MEDICAL ADVICE (OUTPATIENT)
Dept: INTERNAL MEDICINE | Facility: CLINIC | Age: 65
End: 2025-05-01
Payer: COMMERCIAL

## 2025-05-11 SDOH — HEALTH STABILITY: PHYSICAL HEALTH: ON AVERAGE, HOW MANY MINUTES DO YOU ENGAGE IN EXERCISE AT THIS LEVEL?: 20 MIN

## 2025-05-11 SDOH — HEALTH STABILITY: PHYSICAL HEALTH: ON AVERAGE, HOW MANY DAYS PER WEEK DO YOU ENGAGE IN MODERATE TO STRENUOUS EXERCISE (LIKE A BRISK WALK)?: 2 DAYS

## 2025-05-11 ASSESSMENT — SOCIAL DETERMINANTS OF HEALTH (SDOH): HOW OFTEN DO YOU GET TOGETHER WITH FRIENDS OR RELATIVES?: MORE THAN THREE TIMES A WEEK

## 2025-05-12 ENCOUNTER — OFFICE VISIT (OUTPATIENT)
Dept: FAMILY MEDICINE | Facility: CLINIC | Age: 65
End: 2025-05-12
Payer: COMMERCIAL

## 2025-05-12 ENCOUNTER — RESULTS FOLLOW-UP (OUTPATIENT)
Dept: FAMILY MEDICINE | Facility: CLINIC | Age: 65
End: 2025-05-12

## 2025-05-12 ENCOUNTER — ANCILLARY PROCEDURE (OUTPATIENT)
Dept: GENERAL RADIOLOGY | Facility: CLINIC | Age: 65
End: 2025-05-12
Attending: STUDENT IN AN ORGANIZED HEALTH CARE EDUCATION/TRAINING PROGRAM
Payer: COMMERCIAL

## 2025-05-12 VITALS
DIASTOLIC BLOOD PRESSURE: 74 MMHG | RESPIRATION RATE: 16 BRPM | TEMPERATURE: 97.7 F | HEIGHT: 63 IN | SYSTOLIC BLOOD PRESSURE: 118 MMHG | OXYGEN SATURATION: 97 % | HEART RATE: 77 BPM | BODY MASS INDEX: 23.92 KG/M2 | WEIGHT: 135 LBS

## 2025-05-12 DIAGNOSIS — M79.7 FIBROMYALGIA: ICD-10-CM

## 2025-05-12 DIAGNOSIS — G89.29 GROIN PAIN, CHRONIC, LEFT: ICD-10-CM

## 2025-05-12 DIAGNOSIS — K21.9 GASTROESOPHAGEAL REFLUX DISEASE WITHOUT ESOPHAGITIS: ICD-10-CM

## 2025-05-12 DIAGNOSIS — R03.0 ELEVATED BP WITHOUT DIAGNOSIS OF HYPERTENSION: ICD-10-CM

## 2025-05-12 DIAGNOSIS — R10.32 GROIN PAIN, CHRONIC, LEFT: ICD-10-CM

## 2025-05-12 DIAGNOSIS — Z00.00 ROUTINE GENERAL MEDICAL EXAMINATION AT A HEALTH CARE FACILITY: Primary | ICD-10-CM

## 2025-05-12 DIAGNOSIS — R73.03 PREDIABETES: ICD-10-CM

## 2025-05-12 DIAGNOSIS — R31.29 MICROSCOPIC HEMATURIA: ICD-10-CM

## 2025-05-12 DIAGNOSIS — L65.9 HAIR LOSS: ICD-10-CM

## 2025-05-12 DIAGNOSIS — Z11.4 SCREENING FOR HIV (HUMAN IMMUNODEFICIENCY VIRUS): ICD-10-CM

## 2025-05-12 DIAGNOSIS — E78.5 HYPERLIPIDEMIA LDL GOAL <100: ICD-10-CM

## 2025-05-12 DIAGNOSIS — Z80.3 FAMILY HISTORY OF MALIGNANT NEOPLASM OF BREAST: ICD-10-CM

## 2025-05-12 DIAGNOSIS — M53.3 SACROILIAC JOINT PAIN: ICD-10-CM

## 2025-05-12 LAB
ALBUMIN UR-MCNC: NEGATIVE MG/DL
ANION GAP SERPL CALCULATED.3IONS-SCNC: 9 MMOL/L (ref 7–15)
APPEARANCE UR: CLEAR
BILIRUB UR QL STRIP: NEGATIVE
BUN SERPL-MCNC: 8.6 MG/DL (ref 8–23)
CALCIUM SERPL-MCNC: 9.1 MG/DL (ref 8.8–10.4)
CHLORIDE SERPL-SCNC: 102 MMOL/L (ref 98–107)
CHOLEST SERPL-MCNC: 250 MG/DL
COLOR UR AUTO: YELLOW
CREAT SERPL-MCNC: 0.57 MG/DL (ref 0.51–0.95)
EGFRCR SERPLBLD CKD-EPI 2021: >90 ML/MIN/1.73M2
FASTING STATUS PATIENT QL REPORTED: ABNORMAL
FASTING STATUS PATIENT QL REPORTED: ABNORMAL
GLUCOSE SERPL-MCNC: 103 MG/DL (ref 70–99)
GLUCOSE UR STRIP-MCNC: NEGATIVE MG/DL
HCO3 SERPL-SCNC: 26 MMOL/L (ref 22–29)
HDLC SERPL-MCNC: 45 MG/DL
HGB UR QL STRIP: NEGATIVE
HIV 1+2 AB+HIV1 P24 AG SERPL QL IA: NONREACTIVE
KETONES UR STRIP-MCNC: NEGATIVE MG/DL
LDLC SERPL CALC-MCNC: 166 MG/DL
LEUKOCYTE ESTERASE UR QL STRIP: NEGATIVE
NITRATE UR QL: NEGATIVE
NONHDLC SERPL-MCNC: 205 MG/DL
PH UR STRIP: 7 [PH] (ref 5–7)
POTASSIUM SERPL-SCNC: 4.3 MMOL/L (ref 3.4–5.3)
SODIUM SERPL-SCNC: 137 MMOL/L (ref 135–145)
SP GR UR STRIP: 1.01 (ref 1–1.03)
TRIGL SERPL-MCNC: 195 MG/DL
UROBILINOGEN UR STRIP-ACNC: 0.2 E.U./DL

## 2025-05-12 PROCEDURE — 99396 PREV VISIT EST AGE 40-64: CPT | Performed by: STUDENT IN AN ORGANIZED HEALTH CARE EDUCATION/TRAINING PROGRAM

## 2025-05-12 PROCEDURE — 72170 X-RAY EXAM OF PELVIS: CPT | Mod: TC | Performed by: STUDENT IN AN ORGANIZED HEALTH CARE EDUCATION/TRAINING PROGRAM

## 2025-05-12 PROCEDURE — G2211 COMPLEX E/M VISIT ADD ON: HCPCS | Performed by: STUDENT IN AN ORGANIZED HEALTH CARE EDUCATION/TRAINING PROGRAM

## 2025-05-12 PROCEDURE — 80048 BASIC METABOLIC PNL TOTAL CA: CPT | Performed by: STUDENT IN AN ORGANIZED HEALTH CARE EDUCATION/TRAINING PROGRAM

## 2025-05-12 PROCEDURE — 80061 LIPID PANEL: CPT | Performed by: STUDENT IN AN ORGANIZED HEALTH CARE EDUCATION/TRAINING PROGRAM

## 2025-05-12 PROCEDURE — 1125F AMNT PAIN NOTED PAIN PRSNT: CPT | Performed by: STUDENT IN AN ORGANIZED HEALTH CARE EDUCATION/TRAINING PROGRAM

## 2025-05-12 PROCEDURE — 36415 COLL VENOUS BLD VENIPUNCTURE: CPT | Performed by: STUDENT IN AN ORGANIZED HEALTH CARE EDUCATION/TRAINING PROGRAM

## 2025-05-12 PROCEDURE — 99214 OFFICE O/P EST MOD 30 MIN: CPT | Mod: 25 | Performed by: STUDENT IN AN ORGANIZED HEALTH CARE EDUCATION/TRAINING PROGRAM

## 2025-05-12 PROCEDURE — 81003 URINALYSIS AUTO W/O SCOPE: CPT | Performed by: STUDENT IN AN ORGANIZED HEALTH CARE EDUCATION/TRAINING PROGRAM

## 2025-05-12 PROCEDURE — 3078F DIAST BP <80 MM HG: CPT | Performed by: STUDENT IN AN ORGANIZED HEALTH CARE EDUCATION/TRAINING PROGRAM

## 2025-05-12 PROCEDURE — 3074F SYST BP LT 130 MM HG: CPT | Performed by: STUDENT IN AN ORGANIZED HEALTH CARE EDUCATION/TRAINING PROGRAM

## 2025-05-12 PROCEDURE — 87389 HIV-1 AG W/HIV-1&-2 AB AG IA: CPT | Performed by: STUDENT IN AN ORGANIZED HEALTH CARE EDUCATION/TRAINING PROGRAM

## 2025-05-12 PROCEDURE — 82728 ASSAY OF FERRITIN: CPT | Performed by: STUDENT IN AN ORGANIZED HEALTH CARE EDUCATION/TRAINING PROGRAM

## 2025-05-12 RX ORDER — OMEPRAZOLE 20 MG/1
20 CAPSULE, DELAYED RELEASE ORAL DAILY
Qty: 90 CAPSULE | Refills: 3 | Status: SHIPPED | OUTPATIENT
Start: 2025-05-12

## 2025-05-12 RX ORDER — DULOXETIN HYDROCHLORIDE 30 MG/1
30 CAPSULE, DELAYED RELEASE ORAL DAILY
Qty: 90 CAPSULE | Refills: 3 | Status: SHIPPED | OUTPATIENT
Start: 2025-05-12

## 2025-05-12 RX ORDER — PREGABALIN 75 MG/1
CAPSULE ORAL
Qty: 90 CAPSULE | Refills: 11 | Status: SHIPPED | OUTPATIENT
Start: 2025-05-12

## 2025-05-12 ASSESSMENT — ANXIETY QUESTIONNAIRES
GAD7 TOTAL SCORE: 0
7. FEELING AFRAID AS IF SOMETHING AWFUL MIGHT HAPPEN: NOT AT ALL
IF YOU CHECKED OFF ANY PROBLEMS ON THIS QUESTIONNAIRE, HOW DIFFICULT HAVE THESE PROBLEMS MADE IT FOR YOU TO DO YOUR WORK, TAKE CARE OF THINGS AT HOME, OR GET ALONG WITH OTHER PEOPLE: NOT DIFFICULT AT ALL
2. NOT BEING ABLE TO STOP OR CONTROL WORRYING: NOT AT ALL
3. WORRYING TOO MUCH ABOUT DIFFERENT THINGS: NOT AT ALL
GAD7 TOTAL SCORE: 0
5. BEING SO RESTLESS THAT IT IS HARD TO SIT STILL: NOT AT ALL
6. BECOMING EASILY ANNOYED OR IRRITABLE: NOT AT ALL
1. FEELING NERVOUS, ANXIOUS, OR ON EDGE: NOT AT ALL

## 2025-05-12 ASSESSMENT — PATIENT HEALTH QUESTIONNAIRE - PHQ9
SUM OF ALL RESPONSES TO PHQ QUESTIONS 1-9: 0
5. POOR APPETITE OR OVEREATING: NOT AT ALL

## 2025-05-12 ASSESSMENT — PAIN SCALES - GENERAL: PAINLEVEL_OUTOF10: MODERATE PAIN (6)

## 2025-05-12 NOTE — PROGRESS NOTES
Preventive Care Visit  [facility]  Migel Soto MD, Family Medicine  May 12, 2025    Assessment & Plan     Routine general medical examination at a health care facility  Due for fasting lipid, obtained. Amenable to CDC 1x screening for HIV. Due for mammogram, she is aware and plans to set up. UTD on colonoscopy. S/p hysterectomy and paps no longer indicated. Discussed vaccination recommendations.    Screening for HIV (human immunodeficiency virus)  - HIV Antigen Antibody Combo    Family history of malignant neoplasm of breast  Sister with hx of BC in her 40's. Unclear if genetic screening was positive for mutation. She plans to review with sister. Ok to place referral to genetic counselor if desired.     Prediabetes  - Hemoglobin A1c    Elevated BP without diagnosis of hypertension  BP WNL today. Continue to monitor closely.   - Basic metabolic panel  (Ca, Cl, CO2, Creat, Gluc, K, Na, BUN)    Hyperlipidemia LDL goal <100  Not on pharmacotherapy.  - Lipid panel reflex to direct LDL Fasting    Groin pain, chronic, left  Newer, subacute pain. Considering hip OA vs. TYSON vs other. Plan to refer to Ortho after XR results.   - XR Pelvis 1/2 Views    Sacroiliac joint pain  Chronic, longstanding R LBP. No red flag symptoms/signs. R SI joint pain with 3 positive provocative tests. Review of XR lumbar in 2023 unremarkable. Has completed PT several times in past per patient. Using NSAIDs, heat, topicals. Seeing chiropractor with regular massage. All pain relief methods only temporarily effective. Plan to await hip XR as above and then with plan to refer to Ortho for next steps in management.    Fibromyalgia  Currently controlled on 30mg duloxetine daily, 75mg pregabalin TID, cyclobenzaprine prn. Ok to refill for 1 yr.     Gastroesophageal reflux disease without esophagitis  Longstanding hx of GERD since at least 2006 per chart review and typically well controlled with PPI. Is noticing breakthrough sxs  approx EOD with 20mg omeprazole. No dysphagia to solids/liquids. Discussed transition to taking 30 minutes prior to biggest meal of the day. If still persistently noting sxs, RTC in 1-2 months to reassess.    Microscopic hematuria  Repeating UA to ensure resolution of hematuria.   - UA reflex to Microscopic - lab collect    Of note, additional concerns regarding hair loss and thyroid. Discussed setting up separate appt to review.     The longitudinal plan of care for the diagnosis(es)/condition(s) as documented were addressed during this visit. Due to the added complexity in care, I will continue to support Goldie in the subsequent management and with ongoing continuity of care.    Counseling  Appropriate preventive services were addressed with this patient via screening, questionnaire, or discussion as appropriate for fall prevention, nutrition, physical activity, Tobacco-use cessation, social engagement, weight loss and cognition.  Checklist reviewing preventive services available has been given to the patient.  Reviewed patient's diet, addressing concerns and/or questions.   She is at risk for lack of exercise and has been provided with information to increase physical activity for the benefit of her well-being.     Follow up in one year, earlier as needed    Migel Soto MD  [facility]  5/12/2025    Subjective   Goldie is a 64 year old, presenting for the following:  Physical        5/12/2025    10:11 AM   Additional Questions   Roomed by kb        HPI    R back pain  R side back SI joint pain, ongoing.  Has hx of chronic LBP.  This seems more concentrated pain now.  No shooting pain. No weakness or numbness. No fecal or urinary incontinence.   Able to feel groin when wiping. No fevers.   Does see chiropractor with regular massage which is helpful.   Using heat, biofreeze and voltaren gel. This is helpful but temporary.   Ibuprofen use is also helpful. If sxs get worse, will use flexeril.     L  groin pain  Developed in the past 3 months.  Feels L groin pain that shoots through to the L back and pelvic floor region.  Never had before.    Any sort of activity will worsen the pain - including walking, standing, bending, sitting worsens the pain. No pain at rest.     Fibromyalgia  Is taking 75mg TID which is very helpful along with the duloxetine, is only taking once daily.   Not sure if this is helpful but would like to hold at this current dose.  Also has cyclobenzaprine for if flares occur.    GERD  Takes omeprazole daily. If has breakthrough, will take Pepcid/famotidine which is helpful.  Does have triggers or symptoms every other day.   Takes omeprazole several hours prior to meal time.   Notices triggers with spicy food.   No dysphagia to solids or liquids.     Advance Care Planning  Patient states has Health Care Directive and will send to [employer].        5/11/2025   General Health   How would you rate your overall physical health? (!) FAIR   Feel stress (tense, anxious, or unable to sleep) Not at all         5/11/2025   Nutrition   Three or more servings of calcium each day? Yes   Diet: Low salt    Low fat/cholesterol   How many servings of fruit and vegetables per day? 4 or more   How many sweetened beverages each day? 0-1       Multiple values from one day are sorted in reverse-chronological order         5/11/2025   Exercise   Days per week of moderate/strenous exercise 2 days   Average minutes spent exercising at this level 20 min   (!) EXERCISE CONCERN        5/11/2025   Social Factors   Frequency of gathering with friends or relatives More than three times a week   Worry food won't last until get money to buy more No   Food not last or not have enough money for food? No   Do you have housing? (Housing is defined as stable permanent housing and does not include staying outside in a car, in a tent, in an abandoned building, in an overnight shelter, or couch-surfing.) Yes   Are you worried  about losing your housing? No   Lack of transportation? No   Unable to get utilities (heat,electricity)? No         5/12/2025   Fall Risk   Gait Speed Test (Document in seconds) 4   Gait Speed Test Interpretation Less than or equal to 5.00 seconds - PASS          5/11/2025   Dental   Dentist two times every year? Yes     Today's PHQ-2 Score:       1/13/2025     7:52 AM   PHQ-2 ( 1999 [employer])   Q1: Little interest or pleasure in doing things 0   Q2: Feeling down, depressed or hopeless 0   PHQ-2 Score 0    Q1: Little interest or pleasure in doing things Not at all   Q2: Feeling down, depressed or hopeless Not at all   PHQ-2 Score 0       Patient-reported         5/11/2025   Substance Use   Alcohol more than 3/day or more than 7/wk Not Applicable   Do you use any other substances recreationally? No     Social History     Tobacco Use    Smoking status: Never     Passive exposure: Never    Smokeless tobacco: Never   Vaping Use    Vaping status: Never Used   Substance Use Topics    Alcohol use: Yes     Comment: rare    Drug use: No         6/29/2022   LAST FHS-7 RESULTS   1st degree relative breast or ovarian cancer Yes    Yes    Yes    Any relative bilateral breast cancer Unknown    Yes    Yes    Any male have breast cancer No    No   Any ONE woman have BOTH breast AND ovarian cancer No    No   Any woman with breast cancer before 50yrs Yes    Yes   2 or more relatives with breast AND/OR ovarian cancer No    No   2 or more relatives with breast AND/OR bowel cancer No    No       Proxy-reported    Multiple values from one day are sorted in reverse-chronological order     Mammogram Screening - Mammogram every 1-2 years updated in Health Maintenance based on mutual decision making  Sister with breast cancer in her 40's. Had genetic testing but she doesn't remember the results. She plans to ask about this.        5/11/2025   One time HIV Screening   Previous HIV test? No         5/11/2025   STI Screening   New sexual  "partner(s) since last STI/HIV test? No     History of abnormal Pap smear: Status post hysterectomy with removal of cervix and no history of CIN2 or greater or cervical cancer. Health Maintenance and Surgical History updated.     Wilber states she had no abnormal pap smears prior to hysterectomy with cervix removed in 1992. She reports the surg path results were normal.         4/2/2014    12:00 AM 12/1/2008    12:00 AM 3/29/2007    12:00 AM   PAP / HPV   PAP (Historical) NIL  ASC-US  NIL      ASCVD Risk   The 10-year ASCVD risk score (Keenan DUENAS, et al., 2019) is: 4.7%    Values used to calculate the score:      Age: 64 years      Sex: Female      Is Non- : No      Diabetic: No      Tobacco smoker: No      Systolic Blood Pressure: 118 mmHg      Is BP treated: No      HDL Cholesterol: 52 mg/dL      Total Cholesterol: 260 mg/dL    Reviewed and updated as needed this visit by Provider   Tobacco  Allergies  Meds    Surg Hx  Fam Hx               Objective    Exam  /74   Pulse 77   Temp 97.7  F (36.5  C)   Resp 16   Ht 1.6 m (5' 3\")   Wt 61.2 kg (135 lb)   SpO2 97%   BMI 23.91 kg/m     Estimated body mass index is 23.91 kg/m  as calculated from the following:    Height as of this encounter: 1.6 m (5' 3\").    Weight as of this encounter: 61.2 kg (135 lb).    Physical Exam  GENERAL: healthy, alert and no distress  HEAD: Normocephalic, atraumatic.   EYES: PERRL. Normal conjunctivae, sclera.   ENT: Normal EAC and TMs bilaterally. Normal oropharynx.   NECK: Supple. No lymphadenopathy appreciated. Trachea midline. Thyroid not enlarged, not TTP.  RESP: lungs clear to auscultation - no rales, rhonchi or wheezes  CV: regular rate and rhythm, normal S1 S2, no murmur, click, rub or gallop.  No peripheral swelling noted.   ABDOMEN: soft, no TTP x4 quadrants. No hepatomegaly or masses appreciated. BS normactive.  MSK: BACK: Mild TTP along spinous processes throughout. Mild TTP paraspinal " tenderness in thoracic and lumbar spine bilaterally. Negative straight leg test bilaterally.   L HIP: Negative log roll. Positive STEPH. Negative FADIR.   Positive Gaenslen, compression, thigh thrust and distraction test causing reproduction of pain in R lower back.    SKIN: no suspicious lesions or rashes.  EXT: Warm and well perfused.   NEURO: CNII-XII grossly intact. No focal deficits. 5/5 muscle strength in LE bilaterally throughout. Sensation to light touch intact in LE bilaterally throughout. Patellar and achilles reflexes 2+, symmetric. No clonus bilaterally. Toes downgoing to babinski bilaterally. Normal heel and toe walk.     Signed Electronically by: Migel Soto MD

## 2025-05-12 NOTE — PATIENT INSTRUCTIONS
Patient Education   Preventive Care Advice   This is general advice given by our system to help you stay healthy. However, your care team may have specific advice just for you. Please talk to your care team about your preventive care needs.  Nutrition  Eat 5 or more servings of fruits and vegetables each day.  Try wheat bread, brown rice and whole grain pasta (instead of white bread, rice, and pasta).  Get enough calcium and vitamin D. Check the label on foods and aim for 100% of the RDA (recommended daily allowance).  Lifestyle  Exercise at least 150 minutes each week  (30 minutes a day, 5 days a week).  Do muscle strengthening activities 2 days a week. These help control your weight and prevent disease.  No smoking.  Wear sunscreen to prevent skin cancer.  Have a dental exam and cleaning every 6 months.  Yearly exams  See your health care team every year to talk about:  Any changes in your health.  Any medicines your care team has prescribed.  Preventive care, family planning, and ways to prevent chronic diseases.  Shots (vaccines)   HPV shots (up to age 26), if you've never had them before.  Hepatitis B shots (up to age 59), if you've never had them before.  COVID-19 shot: Get this shot when it's due.  Flu shot: Get a flu shot every year.  Tetanus shot: Get a tetanus shot every 10 years.  Pneumococcal, hepatitis A, and RSV shots: Ask your care team if you need these based on your risk.  Shingles shot (for age 50 and up)  General health tests  Diabetes screening:  Starting at age 35, Get screened for diabetes at least every 3 years.  If you are younger than age 35, ask your care team if you should be screened for diabetes.  Cholesterol test: At age 39, start having a cholesterol test every 5 years, or more often if advised.  Bone density scan (DEXA): At age 50, ask your care team if you should have this scan for osteoporosis (brittle bones).  Hepatitis C: Get tested at least once in your life.  STIs (sexually  transmitted infections)  Before age 24: Ask your care team if you should be screened for STIs.  After age 24: Get screened for STIs if you're at risk. You are at risk for STIs (including HIV) if:  You are sexually active with more than one person.  You don't use condoms every time.  You or a partner was diagnosed with a sexually transmitted infection.  If you are at risk for HIV, ask about PrEP medicine to prevent HIV.  Get tested for HIV at least once in your life, whether you are at risk for HIV or not.  Cancer screening tests  Cervical cancer screening: If you have a cervix, begin getting regular cervical cancer screening tests starting at age 21.  Breast cancer scan (mammogram): If you've ever had breasts, begin having regular mammograms starting at age 40. This is a scan to check for breast cancer.  Colon cancer screening: It is important to start screening for colon cancer at age 45.  Have a colonoscopy test every 10 years (or more often if you're at risk) Or, ask your provider about stool tests like a FIT test every year or Cologuard test every 3 years.  To learn more about your testing options, visit:   .  For help making a decision, visit:   https://bit.ly/un89712.  Prostate cancer screening test: If you have a prostate, ask your care team if a prostate cancer screening test (PSA) at age 55 is right for you.  Lung cancer screening: If you are a current or former smoker ages 50 to 80, ask your care team if ongoing lung cancer screenings are right for you.  For informational purposes only. Not to replace the advice of your health care provider. Copyright   2023 [facility]. All rights reserved. Clinically reviewed by the [facility] Transitions Program. [employer] 269048 - REV 01/24.  Preventing Falls: Care Instructions  Injuries and health problems such as trouble walking or poor eyesight can increase your risk of falling. So can some medicines. But there are things you can do to help  "prevent falls. You can exercise to get stronger. You can also arrange your home to make it safer.    Talk to your doctor about the medicines you take. Ask if any of them increase the risk of falls and whether they can be changed or stopped.   Try to exercise regularly. It can help improve your strength and balance. This can help lower your risk of falling.         Practice fall safety and prevention.   Wear low-heeled shoes that fit well and give your feet good support. Talk to your doctor if you have foot problems that make this hard.  Carry a cellphone or wear a medical alert device that you can use to call for help.  Use stepladders instead of chairs to reach high objects. Don't climb if you're at risk for falls. Ask for help, if needed.  Wear the correct eyeglasses, if you need them.        Make your home safer.   Remove rugs, cords, clutter, and furniture from walkways.  Keep your house well lit. Use night-lights in hallways and bathrooms.  Install and use sturdy handrails on stairways.  Wear nonskid footwear, even inside. Don't walk barefoot or in socks without shoes.        Be safe outside.   Use handrails, curb cuts, and ramps whenever possible.  Keep your hands free by using a shoulder bag or backpack.  Try to walk in well-lit areas. Watch out for uneven ground, changes in pavement, and debris.  Be careful in the winter. Walk on the grass or gravel when sidewalks are slippery. Use de-icer on steps and walkways. Add non-slip devices to shoes.    Put grab bars and nonskid mats in your shower or tub and near the toilet. Try to use a shower chair or bath bench when bathing.   Get into a tub or shower by putting in your weaker leg first. Get out with your strong side first. Have a phone or medical alert device in the bathroom with you.   Where can you learn more?  Go to https://www.[employer].net/patiented  Enter G117 in the search box to learn more about \"Preventing Falls: Care Instructions.\"  Current as of: " July 31, 2024  Content Version: 14.4    8421-4568 [employer].   Care instructions adapted under license by your healthcare professional. If you have questions about a medical condition or this instruction, always ask your healthcare professional. [employer] disclaims any warranty or liability for your use of this information.

## 2025-05-14 ENCOUNTER — THERAPY VISIT (OUTPATIENT)
Dept: PHYSICAL THERAPY | Facility: CLINIC | Age: 65
End: 2025-05-14
Attending: EMERGENCY MEDICINE
Payer: COMMERCIAL

## 2025-05-14 ENCOUNTER — OFFICE VISIT (OUTPATIENT)
Dept: FAMILY MEDICINE | Facility: CLINIC | Age: 65
End: 2025-05-14
Payer: COMMERCIAL

## 2025-05-14 VITALS
DIASTOLIC BLOOD PRESSURE: 77 MMHG | RESPIRATION RATE: 18 BRPM | SYSTOLIC BLOOD PRESSURE: 128 MMHG | WEIGHT: 137.3 LBS | HEIGHT: 63 IN | HEART RATE: 85 BPM | OXYGEN SATURATION: 100 % | BODY MASS INDEX: 24.33 KG/M2

## 2025-05-14 DIAGNOSIS — R10.32 GROIN PAIN, CHRONIC, LEFT: ICD-10-CM

## 2025-05-14 DIAGNOSIS — R42 VERTIGO: ICD-10-CM

## 2025-05-14 DIAGNOSIS — H81.12 BENIGN PAROXYSMAL POSITIONAL VERTIGO, LEFT: ICD-10-CM

## 2025-05-14 DIAGNOSIS — R42 DIZZINESS: ICD-10-CM

## 2025-05-14 DIAGNOSIS — G89.29 GROIN PAIN, CHRONIC, LEFT: ICD-10-CM

## 2025-05-14 DIAGNOSIS — M53.3 SACROILIAC JOINT PAIN: ICD-10-CM

## 2025-05-14 DIAGNOSIS — R79.89 HIGH SERUM VITAMIN D: ICD-10-CM

## 2025-05-14 DIAGNOSIS — L65.9 HAIR LOSS: Primary | ICD-10-CM

## 2025-05-14 DIAGNOSIS — H81.10 BENIGN PAROXYSMAL POSITIONAL VERTIGO, UNSPECIFIED LATERALITY: Primary | ICD-10-CM

## 2025-05-14 LAB — FERRITIN SERPL-MCNC: 32 NG/ML (ref 11–328)

## 2025-05-14 PROCEDURE — 1125F AMNT PAIN NOTED PAIN PRSNT: CPT | Performed by: STUDENT IN AN ORGANIZED HEALTH CARE EDUCATION/TRAINING PROGRAM

## 2025-05-14 PROCEDURE — 97112 NEUROMUSCULAR REEDUCATION: CPT | Mod: GP | Performed by: PHYSICAL THERAPIST

## 2025-05-14 PROCEDURE — 99214 OFFICE O/P EST MOD 30 MIN: CPT | Performed by: STUDENT IN AN ORGANIZED HEALTH CARE EDUCATION/TRAINING PROGRAM

## 2025-05-14 PROCEDURE — 3074F SYST BP LT 130 MM HG: CPT | Performed by: STUDENT IN AN ORGANIZED HEALTH CARE EDUCATION/TRAINING PROGRAM

## 2025-05-14 PROCEDURE — 3078F DIAST BP <80 MM HG: CPT | Performed by: STUDENT IN AN ORGANIZED HEALTH CARE EDUCATION/TRAINING PROGRAM

## 2025-05-14 ASSESSMENT — PAIN SCALES - GENERAL: PAINLEVEL_OUTOF10: SEVERE PAIN (8)

## 2025-05-14 NOTE — PROGRESS NOTES
Assessment & Plan     Hair loss  Ongoing for a couple years (at least since 6/2021 per chart review). Recent TSH and CBC WNL. Plan to add ferritin. Likely aging-related/FPHL. Using Barbra Mckenna's organic oral/liquid hair loss vitamin supplement which seems to be helping. Discussed options including trial of topical minoxidil, other oral medications. Declines today, ok to refer to Dermatology if she reconsiders.  - Ferritin    High serum vitamin D  Currently taking 50,000U vitamin D weekly +2000U daily +800U daily (in [employer]'s organic vitamin supplement). Last vitamin D level by functional medicine is high at 67. Discussed that high levels of vitamin D can cause toxicity and elevated calcium, disrupting PTH system. Recommended reducing to between 600-1000U daily.     Sacroiliac joint pain  Groin pain, chronic, left  Reviewed L hip imaging with patient. 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  - Orthopedic  Referral    Follow up in one year, earlier as needed    Migel Soto MD  [facility]  5/14/2025      Merlin Amezcua is a 64 year old, presenting for the following health issues:  Follow Up (Hair loss and vitamin D)        5/14/2025     1:37 PM   Additional Questions   Roomed by Ivett LOJA     History of Present Illness       Reason for visit:  Hair loss, vit DShe consumes 0 sweetened beverage(s) daily.She exercises with enough effort to increase her heart rate 10 to 19 minutes per day.  She exercises with enough effort to increase her heart rate 3 or less days per week.   She is taking medications regularly.        Vitamin D  Not sure if her levels should be checked as it may be related to her hair loss.  Is taking 50,000U D3+K2 once weekly.   Additionally is taking 2000U rest of the week.   Started these for the past several months.     Hair loss  Ongoing for over one year now. Probably actually several years.   No bald patches at  "all.   Mainly on the sides of the head and little on the top.   When seeing Dr. Negrete, got vitamin D level and it was normal at that time.  Now it is ongoing.  Currently taking Barbra Mckenna's organics liquid vitamin and thinks the thickness of the hair has improved. It is no longer growing though.   Did try topical minoxidil for a bit but too difficult to maintain.    Review of labs and imaging today as well        Objective    /77   Pulse 85   Resp 18   Ht 1.6 m (5' 3\")   Wt 62.3 kg (137 lb 4.8 oz)   SpO2 100%   BMI 24.32 kg/m    Body mass index is 24.32 kg/m .    Physical Exam   GENERAL: healthy, alert and no distress  HEAD: Normocephalic, atraumatic.   HAIR: Negative hair pull test. No patches of hair loss. No obvious hair loss appreciated. Normal underlying skin, no scaling, erythema, etc.   EYES: Normal conjunctivae, sclera.   RESP: Normal respiratory effort.  MSK: no gross musculoskeletal defects noted.  SKIN: no suspicious lesions or rashes.  NEURO: CNII-XII grossly intact. No focal deficits.  PSYCH: Groomed, dressed appropriately for weather. Normal mood with consistent affect.     Signed Electronically by: Migel Soto MD  "

## 2025-05-15 ENCOUNTER — PATIENT OUTREACH (OUTPATIENT)
Dept: CARE COORDINATION | Facility: CLINIC | Age: 65
End: 2025-05-15
Payer: COMMERCIAL

## 2025-05-19 ENCOUNTER — PATIENT OUTREACH (OUTPATIENT)
Dept: CARE COORDINATION | Facility: CLINIC | Age: 65
End: 2025-05-19
Payer: COMMERCIAL

## 2025-05-19 ENCOUNTER — TRANSFERRED RECORDS (OUTPATIENT)
Dept: HEALTH INFORMATION MANAGEMENT | Facility: CLINIC | Age: 65
End: 2025-05-19
Payer: COMMERCIAL

## 2025-05-27 ENCOUNTER — OFFICE VISIT (OUTPATIENT)
Dept: FAMILY MEDICINE | Facility: CLINIC | Age: 65
End: 2025-05-27
Payer: COMMERCIAL

## 2025-05-27 ENCOUNTER — OFFICE VISIT (OUTPATIENT)
Dept: PEDIATRICS | Facility: CLINIC | Age: 65
End: 2025-05-27
Payer: COMMERCIAL

## 2025-05-27 ENCOUNTER — RESULTS FOLLOW-UP (OUTPATIENT)
Dept: PEDIATRICS | Facility: CLINIC | Age: 65
End: 2025-05-27

## 2025-05-27 ENCOUNTER — HOSPITAL ENCOUNTER (OUTPATIENT)
Dept: ULTRASOUND IMAGING | Facility: CLINIC | Age: 65
Discharge: HOME | End: 2025-05-27
Attending: NURSE PRACTITIONER
Payer: COMMERCIAL

## 2025-05-27 VITALS
TEMPERATURE: 98.1 F | OXYGEN SATURATION: 98 % | SYSTOLIC BLOOD PRESSURE: 144 MMHG | HEART RATE: 77 BPM | DIASTOLIC BLOOD PRESSURE: 94 MMHG | WEIGHT: 133 LBS | RESPIRATION RATE: 18 BRPM | BODY MASS INDEX: 23.56 KG/M2

## 2025-05-27 VITALS
DIASTOLIC BLOOD PRESSURE: 81 MMHG | SYSTOLIC BLOOD PRESSURE: 128 MMHG | RESPIRATION RATE: 18 BRPM | BODY MASS INDEX: 23.73 KG/M2 | HEIGHT: 63 IN | WEIGHT: 133.9 LBS | TEMPERATURE: 98.3 F | HEART RATE: 78 BPM | OXYGEN SATURATION: 97 %

## 2025-05-27 DIAGNOSIS — R03.0 ELEVATED BP WITHOUT DIAGNOSIS OF HYPERTENSION: ICD-10-CM

## 2025-05-27 DIAGNOSIS — N13.30 HYDRONEPHROSIS, RIGHT: ICD-10-CM

## 2025-05-27 DIAGNOSIS — R10.13 EPIGASTRIC PAIN: Primary | ICD-10-CM

## 2025-05-27 DIAGNOSIS — R10.11 RUQ ABDOMINAL PAIN: Primary | ICD-10-CM

## 2025-05-27 DIAGNOSIS — R10.11 RUQ ABDOMINAL PAIN: ICD-10-CM

## 2025-05-27 LAB
ALBUMIN SERPL BCG-MCNC: 4.2 G/DL (ref 3.5–5.2)
ALP SERPL-CCNC: 113 U/L (ref 40–150)
ALT SERPL W P-5'-P-CCNC: 27 U/L (ref 0–50)
ANION GAP SERPL CALCULATED.3IONS-SCNC: 13 MMOL/L (ref 7–15)
AST SERPL W P-5'-P-CCNC: 19 U/L (ref 0–45)
BASOPHILS # BLD AUTO: 0 10E3/UL (ref 0–0.2)
BASOPHILS NFR BLD AUTO: 1 %
BILIRUB SERPL-MCNC: 0.4 MG/DL
BUN SERPL-MCNC: 15.3 MG/DL (ref 8–23)
CALCIUM SERPL-MCNC: 9 MG/DL (ref 8.8–10.4)
CHLORIDE SERPL-SCNC: 99 MMOL/L (ref 98–107)
CREAT SERPL-MCNC: 0.57 MG/DL (ref 0.51–0.95)
CRP SERPL-MCNC: <3 MG/L
EGFRCR SERPLBLD CKD-EPI 2021: >90 ML/MIN/1.73M2
EOSINOPHIL # BLD AUTO: 0.1 10E3/UL (ref 0–0.7)
EOSINOPHIL NFR BLD AUTO: 1 %
ERYTHROCYTE [DISTWIDTH] IN BLOOD BY AUTOMATED COUNT: 13.8 % (ref 10–15)
GLUCOSE SERPL-MCNC: 110 MG/DL (ref 70–99)
HCO3 SERPL-SCNC: 25 MMOL/L (ref 22–29)
HCT VFR BLD AUTO: 39.5 % (ref 35–47)
HGB BLD-MCNC: 13.4 G/DL (ref 11.7–15.7)
IMM GRANULOCYTES # BLD: 0.1 10E3/UL
IMM GRANULOCYTES NFR BLD: 1 %
LYMPHOCYTES # BLD AUTO: 1.8 10E3/UL (ref 0.8–5.3)
LYMPHOCYTES NFR BLD AUTO: 21 %
MCH RBC QN AUTO: 29.2 PG (ref 26.5–33)
MCHC RBC AUTO-ENTMCNC: 33.9 G/DL (ref 31.5–36.5)
MCV RBC AUTO: 86 FL (ref 78–100)
MONOCYTES # BLD AUTO: 0.4 10E3/UL (ref 0–1.3)
MONOCYTES NFR BLD AUTO: 5 %
NEUTROPHILS # BLD AUTO: 6 10E3/UL (ref 1.6–8.3)
NEUTROPHILS NFR BLD AUTO: 71 %
NRBC # BLD AUTO: 0 10E3/UL
NRBC BLD AUTO-RTO: 0 /100
PLATELET # BLD AUTO: 316 10E3/UL (ref 150–450)
POTASSIUM SERPL-SCNC: 4.4 MMOL/L (ref 3.4–5.3)
PROT SERPL-MCNC: 7 G/DL (ref 6.4–8.3)
RBC # BLD AUTO: 4.59 10E6/UL (ref 3.8–5.2)
SODIUM SERPL-SCNC: 137 MMOL/L (ref 135–145)
WBC # BLD AUTO: 8.4 10E3/UL (ref 4–11)

## 2025-05-27 PROCEDURE — 86140 C-REACTIVE PROTEIN: CPT | Performed by: NURSE PRACTITIONER

## 2025-05-27 PROCEDURE — 1125F AMNT PAIN NOTED PAIN PRSNT: CPT | Performed by: PHYSICIAN ASSISTANT

## 2025-05-27 PROCEDURE — 3080F DIAST BP >= 90 MM HG: CPT | Performed by: NURSE PRACTITIONER

## 2025-05-27 PROCEDURE — 3079F DIAST BP 80-89 MM HG: CPT | Performed by: PHYSICIAN ASSISTANT

## 2025-05-27 PROCEDURE — 3074F SYST BP LT 130 MM HG: CPT | Performed by: PHYSICIAN ASSISTANT

## 2025-05-27 PROCEDURE — 3077F SYST BP >= 140 MM HG: CPT | Performed by: NURSE PRACTITIONER

## 2025-05-27 PROCEDURE — 99207 REFERRAL TO ACUTE AND DIAGNOSTIC SERVICES: CPT | Performed by: PHYSICIAN ASSISTANT

## 2025-05-27 PROCEDURE — 99214 OFFICE O/P EST MOD 30 MIN: CPT | Performed by: NURSE PRACTITIONER

## 2025-05-27 PROCEDURE — 80053 COMPREHEN METABOLIC PANEL: CPT | Performed by: NURSE PRACTITIONER

## 2025-05-27 PROCEDURE — 85025 COMPLETE CBC W/AUTO DIFF WBC: CPT | Performed by: NURSE PRACTITIONER

## 2025-05-27 PROCEDURE — 36415 COLL VENOUS BLD VENIPUNCTURE: CPT | Performed by: NURSE PRACTITIONER

## 2025-05-27 PROCEDURE — 76705 ECHO EXAM OF ABDOMEN: CPT

## 2025-05-27 SDOH — HEALTH STABILITY: PHYSICAL HEALTH: ON AVERAGE, HOW MANY MINUTES DO YOU ENGAGE IN EXERCISE AT THIS LEVEL?: 20 MIN

## 2025-05-27 SDOH — HEALTH STABILITY: PHYSICAL HEALTH: ON AVERAGE, HOW MANY DAYS PER WEEK DO YOU ENGAGE IN MODERATE TO STRENUOUS EXERCISE (LIKE A BRISK WALK)?: 2 DAYS

## 2025-05-27 ASSESSMENT — LIFESTYLE VARIABLES
AUDIT-C TOTAL SCORE: 0
HOW OFTEN DO YOU HAVE SIX OR MORE DRINKS ON ONE OCCASION: NEVER
HOW OFTEN DO YOU HAVE A DRINK CONTAINING ALCOHOL: NEVER
HOW MANY STANDARD DRINKS CONTAINING ALCOHOL DO YOU HAVE ON A TYPICAL DAY: PATIENT DOES NOT DRINK
SKIP TO QUESTIONS 9-10: 1

## 2025-05-27 ASSESSMENT — SOCIAL DETERMINANTS OF HEALTH (SDOH)
HOW OFTEN DO YOU ATTENT MEETINGS OF THE CLUB OR ORGANIZATION YOU BELONG TO?: MORE THAN 4 TIMES PER YEAR
DO YOU BELONG TO ANY CLUBS OR ORGANIZATIONS SUCH AS CHURCH GROUPS UNIONS, FRATERNAL OR ATHLETIC GROUPS, OR SCHOOL GROUPS?: YES
HOW OFTEN DO YOU GET TOGETHER WITH FRIENDS OR RELATIVES?: TWICE A WEEK
HOW OFTEN DO YOU ATTEND CHURCH OR RELIGIOUS SERVICES?: MORE THAN 4 TIMES PER YEAR
IN A TYPICAL WEEK, HOW MANY TIMES DO YOU TALK ON THE PHONE WITH FAMILY, FRIENDS, OR NEIGHBORS?: MORE THAN THREE TIMES A WEEK

## 2025-05-27 ASSESSMENT — PAIN SCALES - GENERAL: PAINLEVEL_OUTOF10: SEVERE PAIN (8)

## 2025-05-27 NOTE — PATIENT INSTRUCTIONS
Results for orders placed or performed during the hospital encounter of 05/27/25   US Abdomen Limited     Status: None    Narrative    EXAM: US ABDOMEN LIMITED  LOCATION: [facility]  DATE: 5/27/2025    INDICATION:  RUQ abdominal pain.  COMPARISON: None.  TECHNIQUE: Limited abdominal ultrasound.    FINDINGS:    GALLBLADDER: Normal. No gallstones, wall thickening, or pericholecystic fluid. Negative sonographic Hanks's sign.    BILE DUCTS: No biliary dilatation. The common duct measures 3 mm.    LIVER: Increased echogenicity from diffuse fatty infiltration. No focal mass. The portal vein is patent with flow in the normal direction.    RIGHT KIDNEY: Mild right hydronephrosis is noted. No evidence for renal mass.    PANCREAS: The visualized portions are normal.    No ascites.      Impression    IMPRESSION:  1.  Mild right hydronephrosis.  2.  Fatty infiltration of the liver.       Results for orders placed or performed in visit on 05/27/25   Comprehensive metabolic panel     Status: Abnormal   Result Value Ref Range    Sodium 137 135 - 145 mmol/L    Potassium 4.4 3.4 - 5.3 mmol/L    Carbon Dioxide (CO2) 25 22 - 29 mmol/L    Anion Gap 13 7 - 15 mmol/L    Urea Nitrogen 15.3 8.0 - 23.0 mg/dL    Creatinine 0.57 0.51 - 0.95 mg/dL    GFR Estimate >90 >60 mL/min/1.73m2    Calcium 9.0 8.8 - 10.4 mg/dL    Chloride 99 98 - 107 mmol/L    Glucose 110 (H) 70 - 99 mg/dL    Alkaline Phosphatase 113 40 - 150 U/L    AST 19 0 - 45 U/L    ALT 27 0 - 50 U/L    Protein Total 7.0 6.4 - 8.3 g/dL    Albumin 4.2 3.5 - 5.2 g/dL    Bilirubin Total 0.4 <=1.2 mg/dL   CRP inflammation     Status: Normal   Result Value Ref Range    CRP Inflammation <3.00 <5.00 mg/L   CBC with platelets and differential     Status: None   Result Value Ref Range    WBC Count 8.4 4.0 - 11.0 10e3/uL    RBC Count 4.59 3.80 - 5.20 10e6/uL    Hemoglobin 13.4 11.7 - 15.7 g/dL    Hematocrit 39.5 35.0 - 47.0 %    MCV 86 78 - 100 fL    MCH 29.2 26.5 -  33.0 pg    MCHC 33.9 31.5 - 36.5 g/dL    RDW 13.8 10.0 - 15.0 %    Platelet Count 316 150 - 450 10e3/uL    % Neutrophils 71 %    % Lymphocytes 21 %    % Monocytes 5 %    % Eosinophils 1 %    % Basophils 1 %    % Immature Granulocytes 1 %    NRBCs per 100 WBC 0 <1 /100    Absolute Neutrophils 6.0 1.6 - 8.3 10e3/uL    Absolute Lymphocytes 1.8 0.8 - 5.3 10e3/uL    Absolute Monocytes 0.4 0.0 - 1.3 10e3/uL    Absolute Eosinophils 0.1 0.0 - 0.7 10e3/uL    Absolute Basophils 0.0 0.0 - 0.2 10e3/uL    Absolute Immature Granulocytes 0.1 <=0.4 10e3/uL    Absolute NRBCs 0.0 10e3/uL   CBC with platelets differential     Status: None    Narrative    The following orders were created for panel order CBC with platelets differential.  Procedure                               Abnormality         Status                     ---------                               -----------         ------                     CBC with platelets and ...[[id number]]                      Final result                 Please view results for these tests on the individual orders.     CT shows MILD right hydronephrosis.    Pt had to leave the clinic prior to reviewing the results, she agreed to come back tomorrow for additional testing.    CBC and CRP and CMP reassuring.    Will return tomorrow for CT and UA to eval for calculi or other obstruction

## 2025-05-27 NOTE — PROGRESS NOTES
Assessment & Plan     Epigastric pain:  - Epigastric pain may be related to NSAID use, possibly indicating GERD, heartburn, or ulcer. Gallbladder issues are also considered due to the nature of the pain.  - Arrange for an ultrasound of the gallbladder area at 2 PM today at the [facility] to investigate further. Consideration of pantoprazole for symptom management. Avoid excessive NSAID use.     Consent was obtained from the patient to use an AI documentation tool in the creation of this note.                Merlin Amezcua is a 64 year old, presenting for the following health issues:  Abdominal Pain        5/27/2025    10:43 AM   Additional Questions   Roomed by Renate WATTS MA   Accompanied by self         5/27/2025    10:43 AM   Patient Reported Additional Medications   Patient reports taking the following new medications none     History of Present Illness       Reason for visit:  Hair loss, vit DShe consumes 0 sweetened beverage(s) daily.She exercises with enough effort to increase her heart rate 10 to 19 minutes per day.  She exercises with enough effort to increase her heart rate 3 or less days per week.   She is taking medications regularly.        Pain History:  When did you first notice your pain? 1 week ago    Have you seen anyone else for your pain? No  How has your pain affected your ability to work? 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 epigastric area pain   Onset/Duration:  1week  Description:   Character: Sharp, Dull ache, and Stabbing  Location: epigastric region  Radiation:  Shoulder blades  Intensity: moderate, severe  Progression of Symptoms:  worsening  Accompanying Signs & Symptoms:  Fever/Chills: No  Gas/Bloating: No  Nausea: No  Vomitting: No  Diarrhea: No  Constipation: No  Dysuria or Hematuria: No  History:   Trauma: No  Previous similar pain: YES  Previous tests done: upper GI   Precipitating factors:   Does the  "pain change with:     Food: No    Bowel Movement: No    Urination: No   Other factors:  No  Therapies tried and outcome: omeprazole or pepcid and heating pad   No LMP recorded. Patient has had a hysterectomy.      Stomach Pain  - Reports stomach pain that radiates to the back, similar to previous episodes believed to be related to an ulcer.  - Pain was particularly severe last night, causing significant discomfort and affecting sleep.  - History of taking Advil for back pain, which may have contributed to the stomach pain.  - Previously treated with pantoprazole for similar symptoms.  - Currently taking omeprazole 20 mg, but an extra dose taken last night did not alleviate the pain.  - No blood in stool, vomiting, or nausea reported.  - Avoids spicy foods and is conscious of dietary triggers.  - Pain is not associated with heart symptoms, such as pain radiating down the arm.             Objective    /81 (BP Location: Right arm, Patient Position: Sitting, Cuff Size: Adult Regular)   Pulse 78   Temp 98.3  F (36.8  C) (Oral)   Resp 18   Ht 1.6 m (5' 3\")   Wt 60.7 kg (133 lb 14.4 oz)   SpO2 97%   BMI 23.72 kg/m    Body mass index is 23.72 kg/m .  Physical Exam   GENERAL: alert and no distress  MS: no gross musculoskeletal defects noted, no edema  PSYCH: mentation appears normal, affect normal/bright            Signed Electronically by: Andrea Penaloza PA-C    "

## 2025-05-27 NOTE — PROGRESS NOTES
Acute and Diagnostic Services Clinic Visit    Assessment & Plan     RUQ abdominal pain  - CBC with platelets differential; Future  - Comprehensive metabolic panel; Future  - CRP inflammation; Future  - US Abdomen Limited; Future  - CBC with platelets differential  - Comprehensive metabolic panel  - CRP inflammation    Hydronephrosis, right    Elevated BP without diagnosis of hypertension      Patient Instructions     Results for orders placed or performed during the hospital encounter of 05/27/25   US Abdomen Limited     Status: None    Narrative    EXAM: US ABDOMEN LIMITED  LOCATION: [facility]  DATE: 5/27/2025    INDICATION:  RUQ abdominal pain.  COMPARISON: None.  TECHNIQUE: Limited abdominal ultrasound.    FINDINGS:    GALLBLADDER: Normal. No gallstones, wall thickening, or pericholecystic fluid. Negative sonographic Hanks's sign.    BILE DUCTS: No biliary dilatation. The common duct measures 3 mm.    LIVER: Increased echogenicity from diffuse fatty infiltration. No focal mass. The portal vein is patent with flow in the normal direction.    RIGHT KIDNEY: Mild right hydronephrosis is noted. No evidence for renal mass.    PANCREAS: The visualized portions are normal.    No ascites.      Impression    IMPRESSION:  1.  Mild right hydronephrosis.  2.  Fatty infiltration of the liver.       Results for orders placed or performed in visit on 05/27/25   Comprehensive metabolic panel     Status: Abnormal   Result Value Ref Range    Sodium 137 135 - 145 mmol/L    Potassium 4.4 3.4 - 5.3 mmol/L    Carbon Dioxide (CO2) 25 22 - 29 mmol/L    Anion Gap 13 7 - 15 mmol/L    Urea Nitrogen 15.3 8.0 - 23.0 mg/dL    Creatinine 0.57 0.51 - 0.95 mg/dL    GFR Estimate >90 >60 mL/min/1.73m2    Calcium 9.0 8.8 - 10.4 mg/dL    Chloride 99 98 - 107 mmol/L    Glucose 110 (H) 70 - 99 mg/dL    Alkaline Phosphatase 113 40 - 150 U/L    AST 19 0 - 45 U/L    ALT 27 0 - 50 U/L    Protein Total 7.0 6.4 - 8.3 g/dL    Albumin  4.2 3.5 - 5.2 g/dL    Bilirubin Total 0.4 <=1.2 mg/dL   CRP inflammation     Status: Normal   Result Value Ref Range    CRP Inflammation <3.00 <5.00 mg/L   CBC with platelets and differential     Status: None   Result Value Ref Range    WBC Count 8.4 4.0 - 11.0 10e3/uL    RBC Count 4.59 3.80 - 5.20 10e6/uL    Hemoglobin 13.4 11.7 - 15.7 g/dL    Hematocrit 39.5 35.0 - 47.0 %    MCV 86 78 - 100 fL    MCH 29.2 26.5 - 33.0 pg    MCHC 33.9 31.5 - 36.5 g/dL    RDW 13.8 10.0 - 15.0 %    Platelet Count 316 150 - 450 10e3/uL    % Neutrophils 71 %    % Lymphocytes 21 %    % Monocytes 5 %    % Eosinophils 1 %    % Basophils 1 %    % Immature Granulocytes 1 %    NRBCs per 100 WBC 0 <1 /100    Absolute Neutrophils 6.0 1.6 - 8.3 10e3/uL    Absolute Lymphocytes 1.8 0.8 - 5.3 10e3/uL    Absolute Monocytes 0.4 0.0 - 1.3 10e3/uL    Absolute Eosinophils 0.1 0.0 - 0.7 10e3/uL    Absolute Basophils 0.0 0.0 - 0.2 10e3/uL    Absolute Immature Granulocytes 0.1 <=0.4 10e3/uL    Absolute NRBCs 0.0 10e3/uL   CBC with platelets differential     Status: None    Narrative    The following orders were created for panel order CBC with platelets differential.  Procedure                               Abnormality         Status                     ---------                               -----------         ------                     CBC with platelets and ...[[id number]]                      Final result                 Please view results for these tests on the individual orders.     CT shows MILD right hydronephrosis.    Pt had to leave the clinic prior to reviewing the results, she agreed to come back tomorrow for additional testing.    CBC and CRP and CMP reassuring.    Will return tomorrow for CT and UA to eval for calculi or other obstruction       Subjective   Goldie is a 64 year old, presenting for the following health issues:  Abdominal Pain (X 2 weeks epigastric)    HPI      Abdominal/Flank Pain  Onset/Duration: X 1 week  Description:    Character: Sharp, Dull ache, and Stabbing  Location: epigastric region  Radiation: Back and RUQ, R shoulder?  Intensity: 8/10  Progression of Symptoms:  worsening  Accompanying Signs & Symptoms:  Fever/chills: no   Gas/Bloating: no   Nausea: no   Vomitting: no   Diarrhea: no   Constipation:no   Dysuria: no            Hematuria: no            Frequency: no            Incontinence of urine: no   History:            Last bowel movement: today-normal  Trauma: no   Previous similar pain: YES- rx'd Pantoprazole    Previous tests done: none           Previous Abdominal surgery: no   Precipitating factors:   Does the pain change with:     Food: no      Bowel Movement: no     Urination: no              Other factors: no   Therapies tried and outcome:  Omeprazole, Pepcid, heating pad, this did not help    When food last eaten: Gran[employer] today @ 10:00 AM        Review of Systems  Constitutional, HEENT, cardiovascular, pulmonary, GI, , musculoskeletal, neuro, skin, endocrine and psych systems are negative, except as otherwise noted.      Objective    BP (!) 144/94 (BP Location: Right arm, Patient Position: Chair, Cuff Size: Adult Regular)   Pulse 77   Temp 98.1  F (36.7  C) (Oral)   Resp 18   Wt 60.3 kg (133 lb)   SpO2 98%   BMI 23.56 kg/m    Body mass index is 23.56 kg/m .  Physical Exam   GENERAL: alert and no distress  NECK: no adenopathy, no asymmetry, masses, or scars  RESP: lungs clear to auscultation - no rales, rhonchi or wheezes  CV: regular rate and rhythm, normal S1 S2, no S3 or S4, no murmur, click or rub, no peripheral edema    ABDOMEN: tenderness epigastric and RUQ and bowel sounds normal  MS: no gross musculoskeletal defects noted, no edema          Signed Electronically by: JANIYA Eric CNP

## 2025-05-28 ENCOUNTER — OFFICE VISIT (OUTPATIENT)
Dept: PEDIATRICS | Facility: CLINIC | Age: 65
End: 2025-05-28
Payer: COMMERCIAL

## 2025-05-28 ENCOUNTER — RESULTS FOLLOW-UP (OUTPATIENT)
Dept: PEDIATRICS | Facility: CLINIC | Age: 65
End: 2025-05-28

## 2025-05-28 VITALS
HEIGHT: 63 IN | BODY MASS INDEX: 23.57 KG/M2 | DIASTOLIC BLOOD PRESSURE: 92 MMHG | HEART RATE: 76 BPM | OXYGEN SATURATION: 98 % | TEMPERATURE: 98.2 F | WEIGHT: 133 LBS | SYSTOLIC BLOOD PRESSURE: 141 MMHG

## 2025-05-28 DIAGNOSIS — N13.30 HYDRONEPHROSIS, RIGHT: ICD-10-CM

## 2025-05-28 DIAGNOSIS — R10.13 EPIGASTRIC PAIN: Primary | ICD-10-CM

## 2025-05-28 DIAGNOSIS — R10.11 RUQ ABDOMINAL PAIN: ICD-10-CM

## 2025-05-28 LAB
ALBUMIN SERPL BCG-MCNC: 4.2 G/DL (ref 3.5–5.2)
ALBUMIN UR-MCNC: NEGATIVE MG/DL
ALP SERPL-CCNC: ABNORMAL U/L
ALT SERPL W P-5'-P-CCNC: ABNORMAL U/L
ANION GAP SERPL CALCULATED.3IONS-SCNC: 9 MMOL/L (ref 7–15)
APPEARANCE UR: CLEAR
AST SERPL W P-5'-P-CCNC: ABNORMAL U/L
BACTERIA #/AREA URNS HPF: ABNORMAL /HPF
BILIRUB SERPL-MCNC: 0.3 MG/DL
BILIRUB UR QL STRIP: NEGATIVE
BUN SERPL-MCNC: 14.5 MG/DL (ref 8–23)
CALCIUM SERPL-MCNC: 8.9 MG/DL (ref 8.8–10.4)
CHLORIDE SERPL-SCNC: 101 MMOL/L (ref 98–107)
COLOR UR AUTO: ABNORMAL
CREAT SERPL-MCNC: 0.55 MG/DL (ref 0.51–0.95)
EGFRCR SERPLBLD CKD-EPI 2021: >90 ML/MIN/1.73M2
GLUCOSE SERPL-MCNC: 101 MG/DL (ref 70–99)
GLUCOSE UR STRIP-MCNC: NEGATIVE MG/DL
HCO3 SERPL-SCNC: 24 MMOL/L (ref 22–29)
HGB UR QL STRIP: NEGATIVE
KETONES UR STRIP-MCNC: NEGATIVE MG/DL
LEUKOCYTE ESTERASE UR QL STRIP: NEGATIVE
NITRATE UR QL: NEGATIVE
PH UR STRIP: 6.5 [PH] (ref 5–7)
POTASSIUM SERPL-SCNC: 5.5 MMOL/L (ref 3.4–5.3)
PROT SERPL-MCNC: 7.3 G/DL (ref 6.4–8.3)
RBC URINE: 1 /HPF
SODIUM SERPL-SCNC: 134 MMOL/L (ref 135–145)
SP GR UR STRIP: 1.02 (ref 1–1.03)
SQUAMOUS EPITHELIAL: 1 /HPF
UROBILINOGEN UR STRIP-MCNC: NORMAL MG/DL
WBC URINE: 1 /HPF

## 2025-05-28 NOTE — PROGRESS NOTES
"Acute and Diagnostic Services Clinic Visit    {PROVIDER CHARTING PREFERENCE:083504}    Merlin Amezcua is a 64 year old, presenting for the following health issues:  Follow Up (Patient was seen  yesterday for abdominal pain , needs f/u ct scan )  {(!) Visit Details have not yet been documented.  Please enter Visit Details and then use this list to pull in documentation. (Optional):694532}  HPI      ***      {ROS Picklists (Optional):452685}      Objective    BP (!) 141/92 (BP Location: Right arm, Patient Position: Sitting, Cuff Size: Adult Regular)   Pulse 76   Temp 98.2  F (36.8  C) (Oral)   Ht 1.6 m (5' 3\")   Wt 60.3 kg (133 lb)   SpO2 98%   BMI 23.56 kg/m    Body mass index is 23.56 kg/m .  Physical Exam   {Exam List (Optional):205897}    {Diagnostic Test Results (Optional):078798}        Signed Electronically by: Mckenzie Chavez PA-C  {Email feedback regarding this note to [email or web address]   :[id number]}  "

## 2025-05-30 ENCOUNTER — HOSPITAL ENCOUNTER (OUTPATIENT)
Dept: CT IMAGING | Facility: CLINIC | Age: 65
Discharge: HOME | End: 2025-05-30
Attending: PHYSICIAN ASSISTANT | Admitting: PHYSICIAN ASSISTANT
Payer: COMMERCIAL

## 2025-05-30 DIAGNOSIS — N13.30 HYDRONEPHROSIS, RIGHT: ICD-10-CM

## 2025-05-30 DIAGNOSIS — R10.13 EPIGASTRIC PAIN: ICD-10-CM

## 2025-05-30 DIAGNOSIS — R10.11 RUQ ABDOMINAL PAIN: ICD-10-CM

## 2025-05-30 PROCEDURE — 250N000011 HC RX IP 250 OP 636: Performed by: PHYSICIAN ASSISTANT

## 2025-05-30 PROCEDURE — 74178 CT ABD&PLV WO CNTR FLWD CNTR: CPT

## 2025-05-30 PROCEDURE — 250N000009 HC RX 250: Performed by: PHYSICIAN ASSISTANT

## 2025-05-30 RX ORDER — IOPAMIDOL 755 MG/ML
500 INJECTION, SOLUTION INTRAVASCULAR ONCE
Status: COMPLETED | OUTPATIENT
Start: 2025-05-30 | End: 2025-05-30

## 2025-05-30 RX ADMIN — IOPAMIDOL 66 ML: 755 INJECTION, SOLUTION INTRAVENOUS at 10:12

## 2025-05-30 RX ADMIN — SODIUM CHLORIDE 75 ML: 9 INJECTION, SOLUTION INTRAVENOUS at 10:12

## 2025-06-02 ENCOUNTER — PATIENT OUTREACH (OUTPATIENT)
Dept: CARE COORDINATION | Facility: CLINIC | Age: 65
End: 2025-06-02
Payer: COMMERCIAL

## 2025-06-04 ENCOUNTER — PATIENT OUTREACH (OUTPATIENT)
Dept: CARE COORDINATION | Facility: CLINIC | Age: 65
End: 2025-06-04
Payer: COMMERCIAL

## 2025-06-16 ENCOUNTER — OFFICE VISIT (OUTPATIENT)
Dept: INTERNAL MEDICINE | Facility: CLINIC | Age: 65
End: 2025-06-16
Payer: COMMERCIAL

## 2025-06-16 ENCOUNTER — RESULTS FOLLOW-UP (OUTPATIENT)
Dept: INTERNAL MEDICINE | Facility: CLINIC | Age: 65
End: 2025-06-16

## 2025-06-16 VITALS
WEIGHT: 131.6 LBS | TEMPERATURE: 97.5 F | SYSTOLIC BLOOD PRESSURE: 130 MMHG | DIASTOLIC BLOOD PRESSURE: 87 MMHG | HEIGHT: 63 IN | OXYGEN SATURATION: 98 % | RESPIRATION RATE: 16 BRPM | BODY MASS INDEX: 23.32 KG/M2 | HEART RATE: 84 BPM

## 2025-06-16 DIAGNOSIS — R10.13 EPIGASTRIC PAIN: Primary | ICD-10-CM

## 2025-06-16 PROCEDURE — 3079F DIAST BP 80-89 MM HG: CPT | Performed by: INTERNAL MEDICINE

## 2025-06-16 PROCEDURE — 99214 OFFICE O/P EST MOD 30 MIN: CPT | Performed by: INTERNAL MEDICINE

## 2025-06-16 PROCEDURE — 3075F SYST BP GE 130 - 139MM HG: CPT | Performed by: INTERNAL MEDICINE

## 2025-06-16 NOTE — PATIENT INSTRUCTIONS
Endoscopy  tomorrow at [facility] in [place] at 11:30am. Arrival 1045 AM.If not able to do, then call [phone]   Stay off of NSAIDS for now. OK for Tylenol   Stay on Omeprazole and  carafate for now  If EGD normal, will then get a HIDA scan

## 2025-06-16 NOTE — PROGRESS NOTES
ASSESSMENT:    1. Epigastric pain (Primary)  Persistent epigastric pain only partial improvement with high-dose PPI and Carafate.  At risk for gastritis/PUD with previous ibuprofen use.  Will get endoscopy tomorrow with [facility] including H. pylori biopsy at that time.  Patient will stay on omeprazole and Carafate for now.  If endoscopy normal, will then consider HIDA scan to evaluate gallbladder dysfunction as possible cause.  Normal gallbladder structure on recent ultrasound  - Adult GI  Referral - Procedure Only; Future      PLAN:   Endoscopy  tomorrow at [facility] in [place] at 11:30am. Arrival 10:45 AM. If not able to do, then call [phone]   Stay off of NSAIDS for now. OK for Tylenol   Stay on Omeprazole and  carafate for now  If EGD normal, will then get a HIDA scan           Merlin Amezcua is a 64 year old, presenting for the following health issues:  Results and Abdominal Pain      History of Present Illness       Reason for visit:  Follow for cat scan d/ t epigastric pain which still persists. Pain radiates to back and abdomen with cramping and bloody stools yesterday.    She eats 4 or more servings of fruits and vegetables daily.She consumes 0 sweetened beverage(s) daily.She exercises with enough effort to increase her heart rate 10 to 19 minutes per day.  She exercises with enough effort to increase her heart rate 3 or less days per week.   She is taking medications regularly.        Most recent lab results reviewed with pt.      Component      Latest Ref Rng 5/28/2025  11:25 AM 5/30/2025  9:51 AM   WBC      4.0 - 11.0 10e3/uL  7.5    RBC Count      3.80 - 5.20 10e6/uL  4.79    Hemoglobin      11.7 - 15.7 g/dL  14.0    Hematocrit      35.0 - 47.0 %  42.2    MCV      78 - 100 fL  88    MCH      26.5 - 33.0 pg  29.2    MCHC      31.5 - 36.5 g/dL  33.2    RDW      10.0 - 15.0 %  14.0    Platelet Count      150 - 450 10e3/uL  273    % Neutrophils      %  74    % Lymphocytes      %  14    %  Monocytes      %  10    % Eosinophils      %  1    % Basophils      %  1    % Immature Granulocytes      %  1    NRBC/W      <1 /100  0    Absolute Neutrophil      1.6 - 8.3 10e3/uL  5.5    Absolute Lymphocytes      0.8 - 5.3 10e3/uL  1.1    Absolute Monocytes      0.0 - 1.3 10e3/uL  0.7    Absolute Eosinophils      0.0 - 0.7 10e3/uL  0.1    Absolute Basophils      0.0 - 0.2 10e3/uL  0.0    Absolute Immature Granulocytes      <=0.4 10e3/uL  0.1    Absolute NRBCs      10e3/uL  0.0    Color Urine      Colorless, Straw, Light Yellow, Yellow  Light Yellow     Appearance Urine      Clear  Clear     Glucose Urine      Negative mg/dL Negative     Bilirubin Urine      Negative  Negative     Ketones Urine      Negative mg/dL Negative     Specific Gravity Urine      1.003 - 1.035  1.016     Blood Urine      Negative  Negative     pH Urine      5.0 - 7.0  6.5     Protein Albumin Urine      Negative mg/dL Negative     Urobilinogen mg/dL      Normal mg/dL Normal     Nitrite Urine      Negative  Negative     Leukocyte Esterase Urine      Negative  Negative     Bacteria Urine      None Seen /HPF Few !     RBC Urine      <=2 /HPF 1     WBC Urine      <=5 /HPF 1     Squamous Epithelial /HPF Urine      <=1 /HPF 1     Sodium      135 - 145 mmol/L  132 (L)    Potassium      3.4 - 5.3 mmol/L  4.3    Carbon Dioxide (CO2)      22 - 29 mmol/L  26    Anion Gap      7 - 15 mmol/L  9    Urea Nitrogen      8.0 - 23.0 mg/dL  12.2    Creatinine      0.51 - 0.95 mg/dL  0.66    GFR Estimate      >60 mL/min/1.73m2  >90    Calcium      8.8 - 10.4 mg/dL  8.8    Chloride      98 - 107 mmol/L  97 (L)    Glucose      70 - 99 mg/dL  92    Alkaline Phosphatase      40 - 150 U/L  121    AST      0 - 45 U/L  24    ALT      0 - 50 U/L  30    Protein Total      6.4 - 8.3 g/dL  7.2    Albumin      3.5 - 5.2 g/dL  4.2    Bilirubin Total      <=1.2 mg/dL  0.4        Pt seen in [facility] 5/30/25.  Note reviewed along with CT  Urogram scan and Abd U/S.  Referral to  "urology has been placed and pt awaiting appt re: ureter findings.  Patient's omeprazole was increased to 40 mg daily.  He has pain in the epigastric area which radiates to her back.  Has hemorrhoids and noticed a small amount of bright red blood on toilet paper only yesterday.  Last colonoscopy in 2021 showing 1 polyp only.  Has been taking ibuprofen 800 mg daily for 1 week and mid May and then decrease this to 200 mg dose intermittently.  Denies seeing black tarry stools.  No nausea or vomiting.  Abdominal symptoms slightly better with higher dose omeprazole and Carafate but still persists.  Denies exertional chest pain or shortness of breath       Additional ROS:   Constitutional, HEENT, Cardiovascular, Pulmonary, GI and , Neuro, MSK and Psych review of systems/symptoms are otherwise negative or unchanged from previous, except as noted above.      OBJECTIVE:  /87   Pulse 84   Temp 97.5  F (36.4  C) (Temporal)   Resp 16   Ht 1.6 m (5' 3\")   Wt 59.7 kg (131 lb 9.6 oz)   SpO2 98%   BMI 23.31 kg/m     Estimated body mass index is 23.31 kg/m  as calculated from the following:    Height as of this encounter: 1.6 m (5' 3\").    Weight as of this encounter: 59.7 kg (131 lb 9.6 oz).     Pulm: Lungs clear to auscultation   CV: Regular rates and rhythm  GI: Soft, mildly tender to palpation epigastric area without rebound or guarding normal active bowel sounds, No hepatosplenomegaly or masses palpable  Ext: Peripheral pulses intact. No edema.         (Chart documentation was completed, in part, with [employer] voice-recognition software. Even though reviewed, some grammatical, spelling, and word errors may remain.)    Misbah Negrete MD  Internal Medicine Department  [facility]            "

## 2025-06-19 ENCOUNTER — MYC REFILL (OUTPATIENT)
Dept: FAMILY MEDICINE | Facility: CLINIC | Age: 65
End: 2025-06-19
Payer: COMMERCIAL

## 2025-06-19 DIAGNOSIS — M20.12 HAV (HALLUX ABDUCTO VALGUS), LEFT: ICD-10-CM

## 2025-06-19 DIAGNOSIS — M79.671 FOOT PAIN, BILATERAL: ICD-10-CM

## 2025-06-19 DIAGNOSIS — M72.2 PLANTAR FASCIITIS, BILATERAL: ICD-10-CM

## 2025-06-19 DIAGNOSIS — M79.672 FOOT PAIN, BILATERAL: ICD-10-CM

## 2025-06-19 DIAGNOSIS — B35.1 ONYCHOMYCOSIS OF TOENAIL: ICD-10-CM

## 2025-06-19 DIAGNOSIS — M76.821 POSTERIOR TIBIAL TENDINITIS OF BOTH LOWER EXTREMITIES: ICD-10-CM

## 2025-06-19 DIAGNOSIS — M76.822 POSTERIOR TIBIAL TENDINITIS OF BOTH LOWER EXTREMITIES: ICD-10-CM

## 2025-06-19 DIAGNOSIS — M79.7 FIBROMYALGIA: ICD-10-CM

## 2025-06-23 RX ORDER — CICLOPIROX OLAMINE 7.7 MG/G
CREAM TOPICAL DAILY
Qty: 30 G | Refills: 6 | OUTPATIENT
Start: 2025-06-23

## 2025-06-23 NOTE — TELEPHONE ENCOUNTER
Medication Request for: Ciclopirox 0.77% Cream    Prescription last written on 8/25/2021 by Dr. Iqabl   Sig: apply topically daily   Last Fill Quantity: 30 g  with # refills: 6     Last Office Visit by provider: 4/23/24    Please deny refill request and an appointment can be recommended to patient.     JENNIFER William RN

## 2025-06-25 ENCOUNTER — OFFICE VISIT (OUTPATIENT)
Dept: UROLOGY | Facility: CLINIC | Age: 65
End: 2025-06-25
Payer: COMMERCIAL

## 2025-06-25 VITALS
DIASTOLIC BLOOD PRESSURE: 82 MMHG | SYSTOLIC BLOOD PRESSURE: 132 MMHG | OXYGEN SATURATION: 99 % | WEIGHT: 130 LBS | HEIGHT: 63 IN | BODY MASS INDEX: 23.04 KG/M2 | HEART RATE: 92 BPM

## 2025-06-25 DIAGNOSIS — N81.10 PELVIC ORGAN PROLAPSE QUANTIFICATION STAGE 1 CYSTOCELE: ICD-10-CM

## 2025-06-25 DIAGNOSIS — N28.89 PELVIECTASIS: Primary | ICD-10-CM

## 2025-06-25 DIAGNOSIS — R35.0 URINARY FREQUENCY: ICD-10-CM

## 2025-06-25 PROCEDURE — 3075F SYST BP GE 130 - 139MM HG: CPT | Performed by: STUDENT IN AN ORGANIZED HEALTH CARE EDUCATION/TRAINING PROGRAM

## 2025-06-25 PROCEDURE — 99204 OFFICE O/P NEW MOD 45 MIN: CPT | Performed by: STUDENT IN AN ORGANIZED HEALTH CARE EDUCATION/TRAINING PROGRAM

## 2025-06-25 PROCEDURE — 1126F AMNT PAIN NOTED NONE PRSNT: CPT | Performed by: STUDENT IN AN ORGANIZED HEALTH CARE EDUCATION/TRAINING PROGRAM

## 2025-06-25 PROCEDURE — 3079F DIAST BP 80-89 MM HG: CPT | Performed by: STUDENT IN AN ORGANIZED HEALTH CARE EDUCATION/TRAINING PROGRAM

## 2025-06-25 ASSESSMENT — PAIN SCALES - GENERAL: PAINLEVEL_OUTOF10: NO PAIN (0)

## 2025-06-25 NOTE — LETTER
6/25/2025       RE: Goldie Paul  [address] [place] [address]     Dear Colleague,    Thank you for referring your patient, Goldie Paul, to the [facility] at [facility]. Please see a copy of my visit note below.    Reason for vist:  I am here for right pelviectasis    HPI  Goldie Paul is a 64 year old year old female referred by Mckenzie Chavez PA-C for evaluation of right pelviectasis.    About 1 month ago, Goldie began experiencing midline epigastric pain, radiating to the back. As part of her workup, a RUQ ultrasound was performed which revealed mild right hydronephrosis. A CTU was then performed which revealed very mild right pelviectasis (present since 2007) with contrast draining from each kidney to the bladder.    Cr 0.66 5/30/2025    She does not routinely get UTIs but she did have one this past January (ESBL) with symptoms of dysuria and urinary frequency. This was after a course of antibiotics for a sinus infection. Her UTI symptoms cleared with antibiotics alone.     She also has a history of pelvic organ prolapse which does not bother her. Notes some urinary frequency when she drinks tea. No renal colic symptoms.     Her epigastric pain has improved. She also started to see blood in her stool and recently underwent colonoscopy with [facility] which was reportedly normal.     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     Past Urologic History:  None.    Urologic FH:  None.    Patient Active Problem List    Diagnosis Date Noted     Prediabetes 05/12/2025     Priority: Medium     Elevated BP without diagnosis of hypertension 01/28/2024     Priority: Medium     Family history of malignant neoplasm of breast 06/29/2022     Priority: Medium     Sister, diagnosed at age 44  Goldie has annual mammograms, declines genetic counseling evaluation       Polyp of colon 10/01/2021     Priority: Medium     Cervical  radiculopathy 11/12/2020     Priority: Medium     Restless leg syndrome 09/16/2020     Priority: Medium     Hyperlipidemia LDL goal <100 05/03/2020     Priority: Medium     Seborrheic dermatitis      Priority: Medium     S/P hysterectomy with oophorectomy 01/03/2017     Priority: Medium     Insomnia, unspecified insomnia 02/08/2016     Priority: Medium     Gastroesophageal reflux disease without esophagitis 10/05/2015     Priority: Medium     Fibromyalgia 10/05/2015     Priority: Medium     Plantar fascial fibromatosis 01/13/2006     Priority: Medium     Allergic rhinitis 01/13/2006     Priority: Medium     Problem list name updated by automated process. Provider to review       Other specified idiopathic peripheral neuropathy 01/13/2006     Priority: Medium       Past Medical History:   Diagnosis Date     Allergic rhinitis, cause unspecified      Disorders of bursae and tendons in shoulder region, unspecified 01/2006    right     Endometriosis of other specified sites      Esophageal reflux      FIBROMYALGIA     Neg Rheum work-up     Hyperlipidemia      Impingement syndrome of left shoulder     s/p cortisone injection     Insomnia 12/23/2011     Lateral epicondylitis  of elbow 02/2006    left     Plantar fascial fibromatosis      Polyneuropathy in other diseases classified elsewhere      Radial styloid tenosynovitis     bilateral     Seborrheic dermatitis      Sprain of hand, unspecified site 01/2006    right thumb MCP joint     Uncomplicated asthma        Past Surgical History:   Procedure Laterality Date     ABDOMEN SURGERY  01/01/1992    hysterectomy     APPENDECTOMY  01/01/1977     COLONOSCOPY  2018     SOFT TISSUE SURGERY       [facility] NONSPECIFIC PROCEDURE  01/01/2004    Plantar fasciotomy     [facility] NONSPECIFIC PROCEDURE  01/01/1992    DAVID, BSO for endometriosis       Current Outpatient Medications   Medication Sig Dispense Refill     ciclopirox (LOPROX) 0.77 % cream Apply topically daily 30 g 6     cyclobenzaprine  (FLEXERIL) 5 MG tablet TAKE ONE OR TWO TABLETS (5-10 mg) BY MOUTH AT BEDTIME AS NEEDED for back pain or spasms 180 tablet 1     DULoxetine (CYMBALTA) 30 MG capsule Take 1 capsule (30 mg) by mouth daily. 90 capsule 3     estradiol (VIVELLE-DOT) 0.05 MG/24HR bi-weekly patch APPLY 1 PATCH topically TO CLEAN, DRY, HAIRLESS SKIN TWICE WEEKLY. REMOVE OLD PATCH BEFORE APPLYING A NEW ONE. 24 patch 3     melatonin 3 MG tablet Take 1 tablet (3 mg) by mouth nightly as needed for sleep 100 tablet 3     omeprazole (PRILOSEC) 40 MG DR capsule Take 1 capsule (40 mg) by mouth daily. 30 capsule 0     order for DME Orthotics for both feet 1 Package 2     pregabalin (LYRICA) 75 MG capsule TAKE 1 CAPSULE (75 mg) BY MOUTH THREE TIMES DAILY 90 capsule 11     sucralfate (CARAFATE) 1 GM tablet Take 1 tablet (1 g) by mouth 4 times daily. Take 1 hour before meals and at bedtime 40 tablet 1       Allergies   Allergen Reactions     Amoxicillin Swelling       Social History     Socioeconomic History     Marital status:      Spouse name: Not on file     Number of children: Not on file     Years of education: Not on file     Highest education level: Not on file   Occupational History     Employer: [employer]      Comment: nurse   Tobacco Use     Smoking status: Never     Passive exposure: Never     Smokeless tobacco: Never   Vaping Use     Vaping status: Never Used   Substance and Sexual Activity     Alcohol use: Yes     Comment: rare     Drug use: No     Sexual activity: Yes     Partners: Male     Birth control/protection: Post-menopausal     Comment: Hyst   Other Topics Concern     Parent/sibling w/ CABG, MI or angioplasty before 65F 55M? No   Social History Narrative     Not on file     Social Drivers of Health     Financial Resource Strain: Low Risk  (5/27/2025)    Financial Resource Strain      Within the past 12 months, have you or your family members you live with been unable to get utilities (heat, electricity) when it  was really needed?: No   Food Insecurity: Low Risk  (5/27/2025)    Food Insecurity      Within the past 12 months, did you worry that your food would run out before you got money to buy more?: No      Within the past 12 months, did the food you bought just not last and you didn t have money to get more?: No   Transportation Needs: Low Risk  (5/27/2025)    Transportation Needs      Within the past 12 months, has lack of transportation kept you from medical appointments, getting your medicines, non-medical meetings or appointments, work, or from getting things that you need?: No   Physical Activity: Insufficiently Active (5/27/2025)    Exercise Vital Sign      Days of Exercise per Week: 2 days      Minutes of Exercise per Session: 20 min   Stress: No Stress Concern Present (5/27/2025)    [employer] - Occupational Stress Questionnaire      Feeling of Stress : Not at all   Social Connections: Socially Integrated (5/27/2025)    Social Connection and Isolation Panel [NHANES]      Frequency of Communication with Friends and Family: More than three times a week      Frequency of Social Gatherings with Friends and Family: Twice a week      Attends Church Services: More than 4 times per year      Active Member of Clubs or Organizations: Yes      Attends Club or Organization Meetings: More than 4 times per year      Marital Status:    Interpersonal Safety: Low Risk  (5/12/2025)    Interpersonal Safety      Do you feel physically and emotionally safe where you currently live?: Yes      Within the past 12 months, have you been hit, slapped, kicked or otherwise physically hurt by someone?: No      Within the past 12 months, have you been humiliated or emotionally abused in other ways by your partner or ex-partner?: No   Housing Stability: Low Risk  (5/27/2025)    Housing Stability      Do you have housing? : Yes      Are you worried about losing your housing?: No       Family History  "  Problem Relation Age of Onset     Diabetes Mother      Coronary Artery Disease Father      Parkinsonism Father      Coronary Stenting Father      Diabetes Father      Diabetes Sister      Breast Cancer Sister         dx age 44     Diabetes Brother      Hypertension Brother      Colon Cancer No family hx of      Ovarian Cancer No family hx of        Review of Systems     /82   Pulse 92   Ht 1.6 m (5' 3\")   Wt 59 kg (130 lb)   SpO2 99%   BMI 23.03 kg/m    Constitutional:  well appearing. No acute distress  Head: Atraumatic, normocephalic  Eyes: extra ocular movements intact.  Ears, nose, mouth, throat: moist mucous membranes, normal external condition.  Respiratory: normal chest rise.  No audible wheezes.  Muskuloskeletal: Normal range of motion.  No evidence of clubbing or cyanosis.  Neurologic: Alert and oriented x 3.  Normal gait.  Skin: No rashes or lesions.  Psychiatric: Normal mood and affect    Laboratory Studies:  Cr 5/30/2025-0.66    Imaging:  CTU 5/30/2025-  Normal appearing kidney parenchyma bilaterally with symmetric excretion of contrast bilaterally  Very mild fullness of the right renal pelvis with a possible small lower pole crossing artery  Similar appearance from 2007 CT scan    Assessment:  Right pelviectasis  Right epigastric pain  Pelvic organ prolapse  Urinary frequency    Plan:  I had a discussion with Goldie about her findings which I believe were incidental in nature. She has no classic renal colic symptoms. She did have 1 UTI this winter but does not routinely get UTIs. Her Cr is normal and her kidney paranchyma looks healthy. Her dilation has been stable since 2007.     Given her current symptom profile (epigastric pain, bloody stools) I believe this is non-urologic in nature.    We did discuss common dietary triggers for her overactive bladder. We also discussed possible referral to one of my colleagues for consideration of POP surgery. She declines at this time but will let us " know if she reconsiders.    - Follow-up as needed or if she develops symptoms of renal colic or recurrent UTIs.    I greatly appreciate the opportunity to participate in the care of your patient.  Please feel free to call with any questions or concerns.    Ny Boggs MD  Reconstructive Urology  [facility]      Again, thank you for allowing me to participate in the care of your patient.      Sincerely,    Ny Boggs MD

## 2025-06-25 NOTE — Clinical Note
Loi Rincon-  I saw Goldie in consultation today- such a nice lady. No follow-up needed for her pelviectasis as this has been stable since 2007 and her kidneys drain symmetrically on CTU.  Please don't hesitate to contact me with further questions. I greatly appreciate the referral.   Ny Boggs MD Cell: [phone]

## 2025-06-25 NOTE — PROGRESS NOTES
Reason for vist:  I am here for right pelviectasis    HPI  Goldie Paul is a 64 year old year old female referred by Mckenzie Chavez PA-C for evaluation of right pelviectasis.    About 1 month ago, Goldie began experiencing midline epigastric pain, radiating to the back. As part of her workup, a RUQ ultrasound was performed which revealed mild right hydronephrosis. A CTU was then performed which revealed very mild right pelviectasis (present since 2007) with contrast draining from each kidney to the bladder.    Cr 0.66 5/30/2025    She does not routinely get UTIs but she did have one this past January (ESBL) with symptoms of dysuria and urinary frequency. This was after a course of antibiotics for a sinus infection. Her UTI symptoms cleared with antibiotics alone.     She also has a history of pelvic organ prolapse which does not bother her. Notes some urinary frequency when she drinks tea. No renal colic symptoms.     Her epigastric pain has improved. She also started to see blood in her stool and recently underwent colonoscopy with [facility] which was reportedly normal.     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     Past Urologic History:  None.    Urologic FH:  None.    Patient Active Problem List    Diagnosis Date Noted    Prediabetes 05/12/2025     Priority: Medium    Elevated BP without diagnosis of hypertension 01/28/2024     Priority: Medium    Family history of malignant neoplasm of breast 06/29/2022     Priority: Medium     Sister, diagnosed at age 44  Goldie has annual mammograms, declines genetic counseling evaluation      Polyp of colon 10/01/2021     Priority: Medium    Cervical radiculopathy 11/12/2020     Priority: Medium    Restless leg syndrome 09/16/2020     Priority: Medium    Hyperlipidemia LDL goal <100 05/03/2020     Priority: Medium    Seborrheic dermatitis      Priority: Medium    S/P hysterectomy with oophorectomy 01/03/2017     Priority: Medium    Insomnia, unspecified insomnia  02/08/2016     Priority: Medium    Gastroesophageal reflux disease without esophagitis 10/05/2015     Priority: Medium    Fibromyalgia 10/05/2015     Priority: Medium    Plantar fascial fibromatosis 01/13/2006     Priority: Medium    Allergic rhinitis 01/13/2006     Priority: Medium     Problem list name updated by automated process. Provider to review      Other specified idiopathic peripheral neuropathy 01/13/2006     Priority: Medium       Past Medical History:   Diagnosis Date    Allergic rhinitis, cause unspecified     Disorders of bursae and tendons in shoulder region, unspecified 01/2006    right    Endometriosis of other specified sites     Esophageal reflux     FIBROMYALGIA     Neg Rheum work-up    Hyperlipidemia     Impingement syndrome of left shoulder     s/p cortisone injection    Insomnia 12/23/2011    Lateral epicondylitis  of elbow 02/2006    left    Plantar fascial fibromatosis     Polyneuropathy in other diseases classified elsewhere     Radial styloid tenosynovitis     bilateral    Seborrheic dermatitis     Sprain of hand, unspecified site 01/2006    right thumb MCP joint    Uncomplicated asthma        Past Surgical History:   Procedure Laterality Date    ABDOMEN SURGERY  01/01/1992    hysterectomy    APPENDECTOMY  01/01/1977    COLONOSCOPY  2018    SOFT TISSUE SURGERY      [facility] NONSPECIFIC PROCEDURE  01/01/2004    Plantar fasciotomy    [facility] NONSPECIFIC PROCEDURE  01/01/1992    DAVID, [facility]SO for endometriosis       Current Outpatient Medications   Medication Sig Dispense Refill    ciclopirox (LOPROX) 0.77 % cream Apply topically daily 30 g 6    cyclobenzaprine (FLEXERIL) 5 MG tablet TAKE ONE OR TWO TABLETS (5-10 mg) BY MOUTH AT BEDTIME AS NEEDED for back pain or spasms 180 tablet 1    DULoxetine (CYMBALTA) 30 MG capsule Take 1 capsule (30 mg) by mouth daily. 90 capsule 3    estradiol (VIVELLE-DOT) 0.05 MG/24HR bi-weekly patch APPLY 1 PATCH topically TO CLEAN, DRY, HAIRLESS SKIN TWICE WEEKLY. REMOVE OLD  PATCH BEFORE APPLYING A NEW ONE. 24 patch 3    melatonin 3 MG tablet Take 1 tablet (3 mg) by mouth nightly as needed for sleep 100 tablet 3    omeprazole (PRILOSEC) 40 MG DR capsule Take 1 capsule (40 mg) by mouth daily. 30 capsule 0    order for DME Orthotics for both feet 1 Package 2    pregabalin (LYRICA) 75 MG capsule TAKE 1 CAPSULE (75 mg) BY MOUTH THREE TIMES DAILY 90 capsule 11    sucralfate (CARAFATE) 1 GM tablet Take 1 tablet (1 g) by mouth 4 times daily. Take 1 hour before meals and at bedtime 40 tablet 1       Allergies   Allergen Reactions    Amoxicillin Swelling       Social History     Socioeconomic History    Marital status:      Spouse name: Not on file    Number of children: Not on file    Years of education: Not on file    Highest education level: Not on file   Occupational History     Employer: [employer]      Comment: nurse   Tobacco Use    Smoking status: Never     Passive exposure: Never    Smokeless tobacco: Never   Vaping Use    Vaping status: Never Used   Substance and Sexual Activity    Alcohol use: Yes     Comment: rare    Drug use: No    Sexual activity: Yes     Partners: Male     Birth control/protection: Post-menopausal     Comment: Hyst   Other Topics Concern    Parent/sibling w/ CABG, MI or angioplasty before 65F 55M? No   Social History Narrative    Not on file     Social Drivers of Health     Financial Resource Strain: Low Risk  (5/27/2025)    Financial Resource Strain     Within the past 12 months, have you or your family members you live with been unable to get utilities (heat, electricity) when it was really needed?: No   Food Insecurity: Low Risk  (5/27/2025)    Food Insecurity     Within the past 12 months, did you worry that your food would run out before you got money to buy more?: No     Within the past 12 months, did the food you bought just not last and you didn t have money to get more?: No   Transportation Needs: Low Risk  (5/27/2025)     Transportation Needs     Within the past 12 months, has lack of transportation kept you from medical appointments, getting your medicines, non-medical meetings or appointments, work, or from getting things that you need?: No   Physical Activity: Insufficiently Active (5/27/2025)    Exercise Vital Sign     Days of Exercise per Week: 2 days     Minutes of Exercise per Session: 20 min   Stress: No Stress Concern Present (5/27/2025)    [place] [employer] - Occupational Stress Questionnaire     Feeling of Stress : Not at all   Social Connections: Socially Integrated (5/27/2025)    Social Connection and Isolation Panel [NHANES]     Frequency of Communication with Friends and Family: More than three times a week     Frequency of Social Gatherings with Friends and Family: Twice a week     Attends [employer] Services: More than 4 times per year     Active Member of Clubs or Organizations: Yes     Attends Club or Organization Meetings: More than 4 times per year     Marital Status:    Interpersonal Safety: Low Risk  (5/12/2025)    Interpersonal Safety     Do you feel physically and emotionally safe where you currently live?: Yes     Within the past 12 months, have you been hit, slapped, kicked or otherwise physically hurt by someone?: No     Within the past 12 months, have you been humiliated or emotionally abused in other ways by your partner or ex-partner?: No   Housing Stability: Low Risk  (5/27/2025)    Housing Stability     Do you have housing? : Yes     Are you worried about losing your housing?: No       Family History   Problem Relation Age of Onset    Diabetes Mother     Coronary Artery Disease Father     Parkinsonism Father     Coronary Stenting Father     Diabetes Father     Diabetes Sister     Breast Cancer Sister         dx age 44    Diabetes Brother     Hypertension Brother     Colon Cancer No family hx of     Ovarian Cancer No family hx of        Review of Systems     /82    "Pulse 92   Ht 1.6 m (5' 3\")   Wt 59 kg (130 lb)   SpO2 99%   BMI 23.03 kg/m    Constitutional:  well appearing. No acute distress  Head: Atraumatic, normocephalic  Eyes: extra ocular movements intact.  Ears, nose, mouth, throat: moist mucous membranes, normal external condition.  Respiratory: normal chest rise.  No audible wheezes.  Muskuloskeletal: Normal range of motion.  No evidence of clubbing or cyanosis.  Neurologic: Alert and oriented x 3.  Normal gait.  Skin: No rashes or lesions.  Psychiatric: Normal mood and affect    Laboratory Studies:  Cr 5/30/2025-0.66    Imaging:  CTU 5/30/2025-  Normal appearing kidney parenchyma bilaterally with symmetric excretion of contrast bilaterally  Very mild fullness of the right renal pelvis with a possible small lower pole crossing artery  Similar appearance from 2007 CT scan    Assessment:  Right pelviectasis  Right epigastric pain  Pelvic organ prolapse  Urinary frequency    Plan:  I had a discussion with Goldie about her findings which I believe were incidental in nature. She has no classic renal colic symptoms. She did have 1 UTI this winter but does not routinely get UTIs. Her Cr is normal and her kidney paranchyma looks healthy. Her dilation has been stable since 2007.     Given her current symptom profile (epigastric pain, bloody stools) I believe this is non-urologic in nature.    We did discuss common dietary triggers for her overactive bladder. We also discussed possible referral to one of my colleagues for consideration of POP surgery. She declines at this time but will let us know if she reconsiders.    - Follow-up as needed or if she develops symptoms of renal colic or recurrent UTIs.    I greatly appreciate the opportunity to participate in the care of your patient.  Please feel free to call with any questions or concerns.    Ny Boggs MD  Reconstructive Urology  [facility]    "

## 2025-06-25 NOTE — NURSING NOTE
Chief Complaint   Patient presents with    upj obstruction    Talk about the results of the ct and ultrasound today   Harriet Loza, CMA

## 2025-06-30 ENCOUNTER — MYC MEDICAL ADVICE (OUTPATIENT)
Dept: FAMILY MEDICINE | Facility: CLINIC | Age: 65
End: 2025-06-30
Payer: COMMERCIAL

## 2025-06-30 ENCOUNTER — MYC REFILL (OUTPATIENT)
Dept: FAMILY MEDICINE | Facility: CLINIC | Age: 65
End: 2025-06-30
Payer: COMMERCIAL

## 2025-06-30 DIAGNOSIS — M79.7 FIBROMYALGIA: ICD-10-CM

## 2025-06-30 RX ORDER — PREGABALIN 75 MG/1
CAPSULE ORAL
Qty: 90 CAPSULE | Refills: 11 | OUTPATIENT
Start: 2025-06-30

## 2025-06-30 NOTE — TELEPHONE ENCOUNTER
Clinic RN: Please investigate patient's chart or contact patient if the information cannot be found because patient should have plenty of refills on file at pharmacy. Please verify.     Alli Loera RN on 6/30/2025 at 8:41 AM

## 2025-07-02 ENCOUNTER — TRANSFERRED RECORDS (OUTPATIENT)
Dept: ADMINISTRATIVE | Facility: CLINIC | Age: 65
End: 2025-07-02
Payer: COMMERCIAL

## 2025-07-02 NOTE — PROCEDURES
[facility]   [facility], [address]  [place] [address]    Patient Name: Goldie Paul Gender: Female  Exam Date: 2025 Visit Number: [id number]  Age: 64 Years 11 Months : 1960  Attending MD: Edgardo Castillo MD Medical Record #: [id number]  ______________________________________________________________________________________________  Procedure:    Upper GI Endoscopy   Indications:    Epigastric pain  Provider:        Edgardo Castillo MD   Referring MD: Referral Self   Primary MD:      Misbah Negrete MD  Medications:  Admitting Medication:   0.9% Normal Saline at TKO   Intra Procedure Medications:   Patient received monitored anesthesia care.     Complications: No immediate complications  ______________________________________________________________________________________________  Procedure:   An examination of the heart and lungs was performed within acceptable limits.  The patient was therefore deemed a reasonable candidate for endoscopy and monitored anesthesia care.  The risks, benefits and plan were discussed to the patient and/or patient representative and all questions answered. After obtaining informed consent, the patient received monitored anesthesia care and I passed the scope.   Throughout the procedure the patient's blood pressure, pulse and oxygen saturations were monitored.  The scope was introduced through the mouth and advanced to the second portion of duodenum.         Findings:   Esophagus:    Normal esophagus.     The z-line is 36 centimeters from the incisors.  Top of the gastric folds is 36 centimeters from the incisors.  Stomach:    Normal stomach.    The diaphragm hiatus is at 36 centimeters from the incisors.  Duodenum:    Normal duodenum.    Impression:   Epigastric pain      Plan:  Recommendation Comments:  Healthy appearing upper GI tract.  No cause for pain seen.  Continue daily omeprazole      Electronically Signed                                                              Edgardo Castillo MD   07/02/2025 9:30 AM     Medications:  Medication Dose Sig Description PRN Status PRN Reason Comments   Carafate 1 gram tablet 1 gram take 1 tablet by oral route 4 times every day on an empty stomach 1 hour before meals and at bedtime N     duloxetine 20 mg capsule,delayed release 20 mg take 1 capsule by oral route  every day N     omeprazole 40 mg capsule,delayed release 40 mg take 1 capsule by oral route  every day before a meal N     pregabalin 75 mg capsule 75 mg take 1 capsule by oral route 3 times every day N  taking as directed     Allergies:  Medication Name Ingredient Reaction Comment    AMOXICILLIN Lip swelling      Vital Signs:  Date Time Systolic Diastolic Height Weight BMI   07/02/2025 8:58  77 64 in 134.50 23.80     Smoking Status:    Use Status Type Smoking Status Usage Per Day Years Used Total Pack Years   no/never  Never smoker      Race:    Preferred Language:  English      cc: Misbah Negrete MD  cc: Migel Soto MD        [facility] [phone]

## 2025-07-07 ENCOUNTER — OFFICE VISIT (OUTPATIENT)
Dept: URGENT CARE | Facility: URGENT CARE | Age: 65
End: 2025-07-07
Payer: COMMERCIAL

## 2025-07-07 VITALS
OXYGEN SATURATION: 97 % | DIASTOLIC BLOOD PRESSURE: 76 MMHG | HEIGHT: 63 IN | BODY MASS INDEX: 24.06 KG/M2 | SYSTOLIC BLOOD PRESSURE: 120 MMHG | HEART RATE: 64 BPM | TEMPERATURE: 98.3 F | RESPIRATION RATE: 19 BRPM | WEIGHT: 135.8 LBS

## 2025-07-07 DIAGNOSIS — J06.9 VIRAL URI: Primary | ICD-10-CM

## 2025-07-07 DIAGNOSIS — J02.9 SORE THROAT: ICD-10-CM

## 2025-07-07 LAB — DEPRECATED S PYO AG THROAT QL EIA: NEGATIVE

## 2025-07-07 PROCEDURE — 87651 STREP A DNA AMP PROBE: CPT

## 2025-07-07 PROCEDURE — 3074F SYST BP LT 130 MM HG: CPT

## 2025-07-07 PROCEDURE — 87637 SARSCOV2&INF A&B&RSV AMP PRB: CPT

## 2025-07-07 PROCEDURE — 99213 OFFICE O/P EST LOW 20 MIN: CPT

## 2025-07-07 PROCEDURE — 3078F DIAST BP <80 MM HG: CPT

## 2025-07-07 NOTE — PROGRESS NOTES
"Assessment & Plan     There are no diagnoses linked to this encounter.   {2021 E&M time (Optional):941069}    {Provider  Link to [facility] Help Grid :317890}    No follow-ups on file.    Steve Kaufman MD  [facility] ISABEL Amezcua is a 64 year old female who presents to clinic today for the following health issues:  Chief Complaint   Patient presents with    Throat Problem     Sore throat and body aches for 2 days. Sharp pain in throat. Grandson recent with strep. Taking tylenol for relief every 6 hours.          7/7/2025     4:30 PM   Additional Questions   Roomed by Nga MORENO   Accompanied by self     HPI  2.5 days of symptoms.   Sore throat and body aches. Some fatigue and weakness.   Subjective fevers yesterday. Nasal congestion and runny nose.   No cough. A little nausea yesterday.   Pressure/tenderness below ears.   Appetite reduced.   Alternating tylenol and Advil.   Grandkids were sick. Recent strep.       ***  {UC Conditions (Optional):037058}    Review of Systems  {ROS COMP (Optional):001769}      Objective    /76   Pulse 64   Temp 98.3  F (36.8  C) (Oral)   Resp 19   Ht 1.6 m (5' 3\")   Wt 61.6 kg (135 lb 12.8 oz)   SpO2 97%   BMI 24.06 kg/m    Physical Exam   {Exam List (Optional):019913}    {Diagnostic Test Results (Optional):685274}      "

## 2025-07-07 NOTE — PATIENT INSTRUCTIONS
At this point, I suspect you have a viral upper respiratory illness that needs to run its course.     Continue to push fluids and rest. Tylenol/ibuprofen for discomfort and/or fevers.     Your lab results will be available on 10sect.

## 2025-07-07 NOTE — PROGRESS NOTES
Urgent Care Clinic Visit    Chief Complaint   Patient presents with    Throat Problem     Sore throat and body aches for 2 days. Sharp pain in throat. Grandson recent with strep. Taking tylenol for relief every 6 hours.                7/7/2025     4:30 PM   Additional Questions   Roomed by Nga MORENO   Accompanied by self     Does the patient have a sore throat and either history of fever >100.4 in the previous 24 hours or recent exposure to a known case of strep throat? Yes  Does the patient have a productive cough that started within the past 7 days? No

## 2025-07-07 NOTE — PROGRESS NOTES
"Assessment & Plan     Viral URI  Sore throat  Going on 2 days of symptoms including sore throat, nasal congestion, rhinorrhea, fatigue, body aches. PO intake reduced. No confirmed fevers or cough. Around sick grandkids several days ago. Vitals reassuring. Physical exam notable for swollen tonsils and shotty cervical lymphadenopathy. Rapid strep negative. Suspect viral process. Went over supportive cares and return precautions. Patient wanting limited viral swab in case she would benefit from Tamiflu.   - Influenza A/B, RSV and SARS-CoV2 PCR (COVID-19) Nose  - Streptococcus A Rapid Screen w/Reflex to PCR - Clinic Collect  - Group A Streptococcus PCR Throat Swab             Return if symptoms worsen or fail to improve.    Steve Kaufman MD  [facility] Amezcua is a 64 year old female who presents to clinic today for the following health issues:  Chief Complaint   Patient presents with    Throat Problem     Sore throat and body aches for 2 days. Sharp pain in throat. Grandson recent with strep. Taking tylenol for relief every 6 hours.          7/7/2025     4:30 PM   Additional Questions   Roomed by Nga MORENO   Accompanied by self     HPI  2 days of symptoms.   Sore throat and body aches. Some fatigue and weakness.   Subjective fevers yesterday. Nasal congestion and runny nose.   No cough. A little nausea yesterday.   Pressure/tenderness below ears.   Appetite reduced.   Alternating tylenol and Advil.   Around grandkids who were sick. Recent strep positive.     Review of Systems  Constitutional, HEENT, cardiovascular, pulmonary, gi and gu systems are negative, except as otherwise noted.      Objective    /76   Pulse 64   Temp 98.3  F (36.8  C) (Oral)   Resp 19   Ht 1.6 m (5' 3\")   Wt 61.6 kg (135 lb 12.8 oz)   SpO2 97%   BMI 24.06 kg/m    Physical Exam   GENERAL: alert and no distress, appears fatigued but non-toxic  EYES: Eyes grossly normal to inspection  HENT: ear " canals and TM's normal, nose and mouth without ulcers or lesions, congested, tonsils swollen but non-exudative  NECK: shotty cervical lymphadenopathy  RESP: lungs clear to auscultation - no rales, rhonchi or wheezes, no increased work of breathing  CV: regular rate and rhythm, normal S1 S2, no murmurs  SKIN: no suspicious lesions or rashes on limited exam  NEURO: no focal deficits  PSYCH: mentation appears normal, affect normal/bright    Recent Results (from the past 24 hours)   Streptococcus A Rapid Screen w/Reflex to PCR - Clinic Collect    Specimen: Throat; Swab   Result Value Ref Range    Group A Strep antigen Negative Negative

## 2025-07-08 LAB
FLUAV RNA SPEC QL NAA+PROBE: NEGATIVE
FLUBV RNA RESP QL NAA+PROBE: NEGATIVE
RSV RNA SPEC NAA+PROBE: NEGATIVE
S PYO DNA THROAT QL NAA+PROBE: NOT DETECTED
SARS-COV-2 RNA RESP QL NAA+PROBE: NEGATIVE